# Patient Record
Sex: FEMALE | Race: OTHER | HISPANIC OR LATINO | Employment: FULL TIME | URBAN - METROPOLITAN AREA
[De-identification: names, ages, dates, MRNs, and addresses within clinical notes are randomized per-mention and may not be internally consistent; named-entity substitution may affect disease eponyms.]

---

## 2019-09-08 NOTE — PROGRESS NOTES
Assessment/Plan:    H/O gastric bypass  Will screen for anemia and vitamin-D deficiency  Advised to continue taking vitamins for now  Will call patient with results  Class 1 obesity without serious comorbidity in adult  Will screen for diabetes, elevated cholesterol as well as thyroid  Diagnoses and all orders for this visit:    H/O gastric bypass  -     CBC and differential; Future  -     Vitamin D 25 hydroxy; Future  -     Vitamin B12; Future  -     HEMOGLOBIN A1C W/ EAG ESTIMATION; Future  -     Lipid Panel with Direct LDL reflex; Future    Class 1 obesity due to excess calories without serious comorbidity with body mass index (BMI) of 30 0 to 30 9 in adult  -     TSH, 3rd generation with Free T4 reflex; Future    Other orders  -     vitamin B-12 (VITAMIN B-12) 1,000 mcg tablet; Take 100 mcg by mouth  -     multivitamin-iron-minerals-folic acid (CENTRUM) chewable tablet; Chew 1 tablet daily          Subjective:      Patient ID: Lillian Bose is a 37 y o  female  Patient is a 57-year-old female who presents to clinic to establish care  She is s/p gastric bypass 1 year ago  She denies any the medication problems  Status post gastric bypass  She is currently taking centrum 1 pill by mouth daily along with vitamin B12 supplements  She currently does not take iron or vitamin D supplements  Surgery was performed in [place] approximately 1 year ago  Since then she has lost about 50 lb  She currently does not follow up with the surgeon      Health Maintenance:  -Work:  Works at a [employer]  in [place]  -Cervical cancer screening:  Patient has not had a Pap smear in more than 5 years   -Denies tobacco dependence, alcohol, or drug abuse        The following portions of the patient's history were reviewed and updated as appropriate: allergies, current medications, past family history, past medical history, past social history, past surgical history and problem list     Review of Systems Constitutional: Negative for chills and fever  Respiratory: Negative for cough and shortness of breath  Cardiovascular: Negative for chest pain and palpitations  Endocrine: Negative for polyphagia and polyuria  Musculoskeletal: Negative for back pain  Neurological: Negative for light-headedness, numbness and headaches  Objective:      /64 (BP Location: Left arm, Patient Position: Sitting, Cuff Size: Standard)   Pulse 78   Temp 97 5 °F (36 4 °C) (Temporal)   Resp 18   Ht 4' 11" (1 499 m)   Wt 68 kg (150 lb)   LMP 08/11/2019 (Approximate)   SpO2 96%   BMI 30 30 kg/m²          Physical Exam   Constitutional: She appears well-developed and well-nourished  HENT:   Head: Normocephalic and atraumatic  Eyes: EOM are normal    Neck: Normal range of motion  Neck supple  Cardiovascular: Normal rate and normal heart sounds  Pulmonary/Chest: Effort normal and breath sounds normal  No respiratory distress  Abdominal: Soft  Bowel sounds are normal  She exhibits no distension  Neurological: She is alert  Skin: Skin is warm and dry  Psychiatric: She has a normal mood and affect

## 2019-09-09 ENCOUNTER — OFFICE VISIT (OUTPATIENT)
Dept: FAMILY MEDICINE CLINIC | Facility: CLINIC | Age: 34
End: 2019-09-09

## 2019-09-09 VITALS
HEART RATE: 78 BPM | OXYGEN SATURATION: 96 % | TEMPERATURE: 97.5 F | BODY MASS INDEX: 30.24 KG/M2 | RESPIRATION RATE: 18 BRPM | SYSTOLIC BLOOD PRESSURE: 110 MMHG | DIASTOLIC BLOOD PRESSURE: 64 MMHG | WEIGHT: 150 LBS | HEIGHT: 59 IN

## 2019-09-09 DIAGNOSIS — E66.09 CLASS 1 OBESITY DUE TO EXCESS CALORIES WITHOUT SERIOUS COMORBIDITY WITH BODY MASS INDEX (BMI) OF 30.0 TO 30.9 IN ADULT: ICD-10-CM

## 2019-09-09 DIAGNOSIS — Z98.84 H/O GASTRIC BYPASS: Primary | ICD-10-CM

## 2019-09-09 PROBLEM — E66.811 CLASS 1 OBESITY WITHOUT SERIOUS COMORBIDITY IN ADULT: Status: ACTIVE | Noted: 2019-09-09

## 2019-09-09 PROBLEM — E66.9 CLASS 1 OBESITY WITHOUT SERIOUS COMORBIDITY IN ADULT: Status: ACTIVE | Noted: 2019-09-09

## 2019-09-09 PROCEDURE — 1036F TOBACCO NON-USER: CPT | Performed by: FAMILY MEDICINE

## 2019-09-09 PROCEDURE — 99203 OFFICE O/P NEW LOW 30 MIN: CPT | Performed by: FAMILY MEDICINE

## 2019-09-09 PROCEDURE — 3008F BODY MASS INDEX DOCD: CPT | Performed by: FAMILY MEDICINE

## 2019-09-09 RX ORDER — FOLIC ACID/MULTIVIT,IRON,MINER .4-18-35
1 TABLET,CHEWABLE ORAL DAILY
COMMUNITY
End: 2020-08-01 | Stop reason: ALTCHOICE

## 2019-09-09 RX ORDER — LANOLIN ALCOHOL/MO/W.PET/CERES
100 CREAM (GRAM) TOPICAL
COMMUNITY
End: 2020-08-01 | Stop reason: ALTCHOICE

## 2019-09-09 NOTE — ASSESSMENT & PLAN NOTE
Will screen for anemia and vitamin-D deficiency  Advised to continue taking vitamins for now  Will call patient with results

## 2019-10-14 ENCOUNTER — APPOINTMENT (OUTPATIENT)
Dept: LAB | Facility: HOSPITAL | Age: 34
End: 2019-10-14
Payer: COMMERCIAL

## 2019-10-14 DIAGNOSIS — E66.09 CLASS 1 OBESITY DUE TO EXCESS CALORIES WITHOUT SERIOUS COMORBIDITY WITH BODY MASS INDEX (BMI) OF 30.0 TO 30.9 IN ADULT: ICD-10-CM

## 2019-10-14 DIAGNOSIS — Z98.84 H/O GASTRIC BYPASS: ICD-10-CM

## 2019-10-14 LAB
25(OH)D3 SERPL-MCNC: 26 NG/ML (ref 30–100)
BASOPHILS # BLD AUTO: 0 THOUSANDS/ΜL (ref 0–0.1)
BASOPHILS NFR BLD AUTO: 1 % (ref 0–1)
CHOLEST SERPL-MCNC: 195 MG/DL
EOSINOPHIL # BLD AUTO: 0.1 THOUSAND/ΜL (ref 0–0.4)
EOSINOPHIL NFR BLD AUTO: 2 % (ref 0–6)
ERYTHROCYTE [DISTWIDTH] IN BLOOD BY AUTOMATED COUNT: 12.7 %
EST. AVERAGE GLUCOSE BLD GHB EST-MCNC: 111 MG/DL
HBA1C MFR BLD: 5.5 % (ref 4.2–6.3)
HCT VFR BLD AUTO: 43.2 % (ref 36–46)
HDLC SERPL-MCNC: 60 MG/DL (ref 40–59)
HGB BLD-MCNC: 14.1 G/DL (ref 12–16)
LDLC SERPL CALC-MCNC: 117 MG/DL
LYMPHOCYTES # BLD AUTO: 2.7 THOUSANDS/ΜL (ref 0.5–4)
LYMPHOCYTES NFR BLD AUTO: 54 % (ref 25–45)
MCH RBC QN AUTO: 31.1 PG (ref 26–34)
MCHC RBC AUTO-ENTMCNC: 32.7 G/DL (ref 31–36)
MCV RBC AUTO: 95 FL (ref 80–100)
MONOCYTES # BLD AUTO: 0.3 THOUSAND/ΜL (ref 0.2–0.9)
MONOCYTES NFR BLD AUTO: 6 % (ref 1–10)
NEUTROPHILS # BLD AUTO: 1.9 THOUSANDS/ΜL (ref 1.8–7.8)
NEUTS SEG NFR BLD AUTO: 37 % (ref 45–65)
PLATELET # BLD AUTO: 224 THOUSANDS/UL (ref 150–450)
PMV BLD AUTO: 10.7 FL (ref 8.9–12.7)
RBC # BLD AUTO: 4.55 MILLION/UL (ref 4–5.2)
TRIGL SERPL-MCNC: 90 MG/DL
TSH SERPL DL<=0.05 MIU/L-ACNC: 1.53 UIU/ML (ref 0.47–4.68)
VIT B12 SERPL-MCNC: 997 PG/ML (ref 100–900)
WBC # BLD AUTO: 5.1 THOUSAND/UL (ref 4.5–11)

## 2019-10-14 PROCEDURE — 84443 ASSAY THYROID STIM HORMONE: CPT

## 2019-10-14 PROCEDURE — 82607 VITAMIN B-12: CPT

## 2019-10-14 PROCEDURE — 82306 VITAMIN D 25 HYDROXY: CPT

## 2019-10-14 PROCEDURE — 36415 COLL VENOUS BLD VENIPUNCTURE: CPT

## 2019-10-14 PROCEDURE — 83036 HEMOGLOBIN GLYCOSYLATED A1C: CPT

## 2019-10-14 PROCEDURE — 80061 LIPID PANEL: CPT

## 2019-10-14 PROCEDURE — 85025 COMPLETE CBC W/AUTO DIFF WBC: CPT

## 2020-08-01 ENCOUNTER — APPOINTMENT (EMERGENCY)
Dept: ULTRASOUND IMAGING | Facility: HOSPITAL | Age: 35
DRG: 566 | End: 2020-08-01
Payer: COMMERCIAL

## 2020-08-01 ENCOUNTER — HOSPITAL ENCOUNTER (INPATIENT)
Facility: HOSPITAL | Age: 35
LOS: 1 days | Discharge: HOME | DRG: 566 | End: 2020-08-04
Attending: EMERGENCY MEDICINE | Admitting: FAMILY MEDICINE
Payer: COMMERCIAL

## 2020-08-01 DIAGNOSIS — Z34.90 PREGNANCY: ICD-10-CM

## 2020-08-01 DIAGNOSIS — O23.01 PYELONEPHRITIS AFFECTING PREGNANCY IN FIRST TRIMESTER: ICD-10-CM

## 2020-08-01 DIAGNOSIS — A41.9 SEPSIS (HCC): ICD-10-CM

## 2020-08-01 DIAGNOSIS — N12 PYELONEPHRITIS: Primary | ICD-10-CM

## 2020-08-01 PROBLEM — Z3A.01 LESS THAN 8 WEEKS GESTATION OF PREGNANCY: Status: ACTIVE | Noted: 2020-08-01

## 2020-08-01 LAB
ABO GROUP BLD: NORMAL
ABO GROUP BLD: NORMAL
ALBUMIN SERPL BCP-MCNC: 3.6 G/DL (ref 3–5.2)
ALP SERPL-CCNC: 53 U/L (ref 43–122)
ALT SERPL W P-5'-P-CCNC: 20 U/L (ref 9–52)
ANION GAP SERPL CALCULATED.3IONS-SCNC: 9 MMOL/L (ref 5–14)
APTT PPP: 34 SECONDS (ref 23–37)
AST SERPL W P-5'-P-CCNC: 18 U/L (ref 14–36)
ATRIAL RATE: 112 BPM
B-HCG SERPL-ACNC: ABNORMAL MIU/ML
BACTERIA UR QL AUTO: ABNORMAL /HPF
BASOPHILS # BLD AUTO: 0 THOUSANDS/ΜL (ref 0–0.1)
BASOPHILS NFR BLD AUTO: 0 % (ref 0–1)
BILIRUB SERPL-MCNC: 0.5 MG/DL
BILIRUB UR QL STRIP: NEGATIVE
BLD GP AB SCN SERPL QL: NEGATIVE
BUN SERPL-MCNC: 6 MG/DL (ref 5–25)
CALCIUM SERPL-MCNC: 8.7 MG/DL (ref 8.4–10.2)
CHLORIDE SERPL-SCNC: 99 MMOL/L (ref 97–108)
CLARITY UR: ABNORMAL
CO2 SERPL-SCNC: 23 MMOL/L (ref 22–30)
COLOR UR: YELLOW
CREAT SERPL-MCNC: 0.63 MG/DL (ref 0.6–1.2)
EOSINOPHIL # BLD AUTO: 0 THOUSAND/ΜL (ref 0–0.4)
EOSINOPHIL NFR BLD AUTO: 0 % (ref 0–6)
ERYTHROCYTE [DISTWIDTH] IN BLOOD BY AUTOMATED COUNT: 13.7 %
EXT PREG TEST URINE: POSITIVE
EXT. CONTROL ED NAV: ABNORMAL
GFR SERPL CREATININE-BSD FRML MDRD: 115 ML/MIN/1.73SQ M
GLUCOSE SERPL-MCNC: 98 MG/DL (ref 70–99)
GLUCOSE UR STRIP-MCNC: NEGATIVE MG/DL
HCT VFR BLD AUTO: 33.7 % (ref 36–46)
HGB BLD-MCNC: 11.6 G/DL (ref 12–16)
HGB UR QL STRIP.AUTO: 50
INR PPP: 1.16 (ref 0.84–1.19)
KETONES UR STRIP-MCNC: NEGATIVE MG/DL
LACTATE SERPL-SCNC: 0.6 MMOL/L (ref 0.7–2)
LEUKOCYTE ESTERASE UR QL STRIP: 500
LG PLATELETS BLD QL SMEAR: PRESENT
LYMPHOCYTES # BLD AUTO: 1.5 THOUSANDS/ΜL (ref 0.5–4)
LYMPHOCYTES NFR BLD AUTO: 11 % (ref 25–45)
MCH RBC QN AUTO: 30.6 PG (ref 26–34)
MCHC RBC AUTO-ENTMCNC: 34.4 G/DL (ref 31–36)
MCV RBC AUTO: 89 FL (ref 80–100)
MONOCYTES # BLD AUTO: 1.6 THOUSAND/ΜL (ref 0.2–0.9)
MONOCYTES NFR BLD AUTO: 11 % (ref 1–10)
NEUTROPHILS # BLD AUTO: 11.2 THOUSANDS/ΜL (ref 1.8–7.8)
NEUTS SEG NFR BLD AUTO: 78 % (ref 45–65)
NITRITE UR QL STRIP: POSITIVE
NON-SQ EPI CELLS URNS QL MICRO: ABNORMAL /HPF
P AXIS: 67 DEGREES
PH UR STRIP.AUTO: 7 [PH]
PLATELET # BLD AUTO: 268 THOUSANDS/UL (ref 150–450)
PLATELET BLD QL SMEAR: ADEQUATE
PMV BLD AUTO: 9.7 FL (ref 8.9–12.7)
POTASSIUM SERPL-SCNC: 3.7 MMOL/L (ref 3.6–5)
PR INTERVAL: 154 MS
PROT SERPL-MCNC: 6.9 G/DL (ref 5.9–8.4)
PROT UR STRIP-MCNC: ABNORMAL MG/DL
PROTHROMBIN TIME: 14.9 SECONDS (ref 11.6–14.5)
QRS AXIS: 11 DEGREES
QRSD INTERVAL: 78 MS
QT INTERVAL: 310 MS
QTC INTERVAL: 423 MS
RBC # BLD AUTO: 3.79 MILLION/UL (ref 4–5.2)
RBC #/AREA URNS AUTO: ABNORMAL /HPF
RBC MORPH BLD: NORMAL
RH BLD: POSITIVE
RH BLD: POSITIVE
SODIUM SERPL-SCNC: 131 MMOL/L (ref 137–147)
SP GR UR STRIP.AUTO: 1.01 (ref 1–1.04)
SPECIMEN EXPIRATION DATE: NORMAL
T WAVE AXIS: 24 DEGREES
UROBILINOGEN UA: 1 MG/DL
VENTRICULAR RATE: 112 BPM
WBC # BLD AUTO: 14.4 THOUSAND/UL (ref 4.5–11)
WBC #/AREA URNS AUTO: ABNORMAL /HPF

## 2020-08-01 PROCEDURE — 81001 URINALYSIS AUTO W/SCOPE: CPT | Performed by: EMERGENCY MEDICINE

## 2020-08-01 PROCEDURE — 99285 EMERGENCY DEPT VISIT HI MDM: CPT

## 2020-08-01 PROCEDURE — 87040 BLOOD CULTURE FOR BACTERIA: CPT | Performed by: EMERGENCY MEDICINE

## 2020-08-01 PROCEDURE — 81025 URINE PREGNANCY TEST: CPT | Performed by: EMERGENCY MEDICINE

## 2020-08-01 PROCEDURE — 81003 URINALYSIS AUTO W/O SCOPE: CPT | Performed by: EMERGENCY MEDICINE

## 2020-08-01 PROCEDURE — 76770 US EXAM ABDO BACK WALL COMP: CPT

## 2020-08-01 PROCEDURE — 76856 US EXAM PELVIC COMPLETE: CPT

## 2020-08-01 PROCEDURE — 96361 HYDRATE IV INFUSION ADD-ON: CPT

## 2020-08-01 PROCEDURE — 83605 ASSAY OF LACTIC ACID: CPT | Performed by: EMERGENCY MEDICINE

## 2020-08-01 PROCEDURE — 96365 THER/PROPH/DIAG IV INF INIT: CPT

## 2020-08-01 PROCEDURE — 80081 OBSTETRIC PANEL INC HIV TSTG: CPT | Performed by: EMERGENCY MEDICINE

## 2020-08-01 PROCEDURE — 80053 COMPREHEN METABOLIC PANEL: CPT | Performed by: EMERGENCY MEDICINE

## 2020-08-01 PROCEDURE — 84702 CHORIONIC GONADOTROPIN TEST: CPT | Performed by: EMERGENCY MEDICINE

## 2020-08-01 PROCEDURE — 87186 SC STD MICRODIL/AGAR DIL: CPT | Performed by: EMERGENCY MEDICINE

## 2020-08-01 PROCEDURE — 87077 CULTURE AEROBIC IDENTIFY: CPT | Performed by: EMERGENCY MEDICINE

## 2020-08-01 PROCEDURE — 84145 PROCALCITONIN (PCT): CPT | Performed by: EMERGENCY MEDICINE

## 2020-08-01 PROCEDURE — 85730 THROMBOPLASTIN TIME PARTIAL: CPT | Performed by: EMERGENCY MEDICINE

## 2020-08-01 PROCEDURE — 36415 COLL VENOUS BLD VENIPUNCTURE: CPT | Performed by: EMERGENCY MEDICINE

## 2020-08-01 PROCEDURE — 93005 ELECTROCARDIOGRAM TRACING: CPT

## 2020-08-01 PROCEDURE — 99285 EMERGENCY DEPT VISIT HI MDM: CPT | Performed by: EMERGENCY MEDICINE

## 2020-08-01 PROCEDURE — 93010 ELECTROCARDIOGRAM REPORT: CPT | Performed by: INTERNAL MEDICINE

## 2020-08-01 PROCEDURE — 85610 PROTHROMBIN TIME: CPT | Performed by: EMERGENCY MEDICINE

## 2020-08-01 PROCEDURE — 87086 URINE CULTURE/COLONY COUNT: CPT | Performed by: EMERGENCY MEDICINE

## 2020-08-01 PROCEDURE — 99220 PR INITIAL OBSERVATION CARE/DAY 70 MINUTES: CPT | Performed by: FAMILY MEDICINE

## 2020-08-01 PROCEDURE — 76830 TRANSVAGINAL US NON-OB: CPT

## 2020-08-01 RX ORDER — CEFAZOLIN SODIUM 1 G/50ML
1000 SOLUTION INTRAVENOUS ONCE
Status: DISCONTINUED | OUTPATIENT
Start: 2020-08-01 | End: 2020-08-01

## 2020-08-01 RX ORDER — CEFTRIAXONE 1 G/50ML
1000 INJECTION, SOLUTION INTRAVENOUS ONCE
Status: COMPLETED | OUTPATIENT
Start: 2020-08-01 | End: 2020-08-01

## 2020-08-01 RX ORDER — ACETAMINOPHEN 325 MG/1
650 TABLET ORAL EVERY 6 HOURS PRN
Status: DISCONTINUED | OUTPATIENT
Start: 2020-08-01 | End: 2020-08-04 | Stop reason: HOSPADM

## 2020-08-01 RX ORDER — CEFTRIAXONE 1 G/50ML
1000 INJECTION, SOLUTION INTRAVENOUS EVERY 24 HOURS
Status: DISCONTINUED | OUTPATIENT
Start: 2020-08-02 | End: 2020-08-04 | Stop reason: HOSPADM

## 2020-08-01 RX ORDER — SODIUM CHLORIDE 9 MG/ML
125 INJECTION, SOLUTION INTRAVENOUS CONTINUOUS
Status: DISCONTINUED | OUTPATIENT
Start: 2020-08-01 | End: 2020-08-03

## 2020-08-01 RX ORDER — ACETAMINOPHEN 325 MG/1
975 TABLET ORAL ONCE
Status: COMPLETED | OUTPATIENT
Start: 2020-08-01 | End: 2020-08-01

## 2020-08-01 RX ORDER — FENTANYL CITRATE 50 UG/ML
50 INJECTION, SOLUTION INTRAMUSCULAR; INTRAVENOUS ONCE
Status: DISCONTINUED | OUTPATIENT
Start: 2020-08-01 | End: 2020-08-01

## 2020-08-01 RX ADMIN — SODIUM CHLORIDE 1000 ML: 0.9 INJECTION, SOLUTION INTRAVENOUS at 18:19

## 2020-08-01 RX ADMIN — SODIUM CHLORIDE 1000 ML: 0.9 INJECTION, SOLUTION INTRAVENOUS at 17:36

## 2020-08-01 RX ADMIN — ACETAMINOPHEN 975 MG: 325 TABLET ORAL at 17:07

## 2020-08-01 RX ADMIN — SODIUM CHLORIDE 125 ML/HR: 0.9 INJECTION, SOLUTION INTRAVENOUS at 21:56

## 2020-08-01 RX ADMIN — CEFTRIAXONE 1000 MG: 1 INJECTION, SOLUTION INTRAVENOUS at 18:18

## 2020-08-01 NOTE — ED PROVIDER NOTES
History  Chief Complaint   Patient presents with    Possible UTI     pt  states that she has fever, lower back pain, pain with urination voiding in small amounts  - last dose of Tylenol was at 0900     Patient is a healthy 13-year-old female coming in today with complaints of lower back pain that started yesterday  He was nontraumatic in nature  Patient states that she has subjective fevers and chills  She had no nausea, vomiting, diarrhea, melena or bright red blood per rectum  She took Tylenol and ibuprofen last night and this morning  She did not take it since then  She has dysuria and urinary frequency without any hematuria  She denies any vaginal bleeding spotting or discharge  She denies any discomfort with eating  She has no sick contacts and no recent travel    Patient states that her last menses was approximately 1 month ago      History provided by:  Patient   used: No    Fever - 9 weeks to 74 years   Temp source:  Subjective  Severity:  Moderate  Onset quality:  Gradual  Timing:  Intermittent  Progression:  Waxing and waning  Chronicity:  New  Relieved by:  Nothing  Worsened by:  Nothing  Ineffective treatments:  Ibuprofen and acetaminophen  Associated symptoms: dysuria and myalgias    Associated symptoms: no chest pain, no chills, no confusion, no congestion, no cough, no diarrhea, no ear pain, no headaches, no nausea, no rash, no rhinorrhea, no somnolence, no sore throat and no vomiting    Dysuria:     Severity:  Mild    Onset quality:  Gradual    Timing:  Constant    Progression:  Waxing and waning    Chronicity:  New  Myalgias:     Location:  Generalized    Quality:  Aching    Severity:  Mild    Onset quality:  Gradual    Timing:  Intermittent    Progression:  Waxing and waning  Risk factors: no contaminated food, no contaminated water, no hx of cancer, no immunosuppression, no occupational exposure, no recent sickness, no recent travel and no sick contacts        None History reviewed  No pertinent past medical history  Past Surgical History:   Procedure Laterality Date     SECTION         History reviewed  No pertinent family history  I have reviewed and agree with the history as documented  E-Cigarette/Vaping     E-Cigarette/Vaping Substances     Social History     Tobacco Use    Smoking status: Never Smoker    Smokeless tobacco: Never Used   Substance Use Topics    Alcohol use: Not Currently    Drug use: Never       Review of Systems   Constitutional: Positive for fever  Negative for chills and diaphoresis  HENT: Negative  Negative for congestion, ear pain, rhinorrhea and sore throat  Eyes: Negative  Negative for visual disturbance  Respiratory: Negative  Negative for cough, chest tightness and shortness of breath  Cardiovascular: Negative  Negative for chest pain and palpitations  Gastrointestinal: Positive for abdominal pain  Negative for diarrhea, nausea and vomiting  Genitourinary: Positive for dysuria, frequency and urgency  Negative for difficulty urinating  Musculoskeletal: Positive for myalgias  Negative for back pain and neck pain  Skin: Negative for rash  Neurological: Negative  Negative for weakness and headaches  Hematological: Negative  Psychiatric/Behavioral: Negative  Negative for confusion  All other systems reviewed and are negative  Physical Exam  Physical Exam   Constitutional: She is oriented to person, place, and time  She appears well-developed and well-nourished  No distress  HENT:   Head: Normocephalic and atraumatic  Dry mucous membranes  Patient maintaining airway and maintaining secretions  No stridor  Eyes: Pupils are equal, round, and reactive to light  Conjunctivae and EOM are normal    Neck: Normal range of motion  Neck supple  Cardiovascular: Regular rhythm, normal heart sounds and intact distal pulses  Tachycardia present  No murmur heard    Pulses:       Radial pulses are 2+ on the right side, and 2+ on the left side  Dorsalis pedis pulses are 2+ on the right side, and 2+ on the left side  Pulmonary/Chest: Effort normal and breath sounds normal  No stridor  No respiratory distress  Abdominal: Soft  Bowel sounds are normal  She exhibits no distension  There is no tenderness  There is CVA tenderness (Right flank tenderness  )  Musculoskeletal: Normal range of motion  She exhibits no edema  Arms:  Neurological: She is alert and oriented to person, place, and time  No cranial nerve deficit  GCS eye subscore is 4  GCS verbal subscore is 5  GCS motor subscore is 6  GCS 15  No slurred speech  No facial asymmetry  No ataxia   Skin: Skin is warm  Capillary refill takes less than 2 seconds  She is not diaphoretic  Psychiatric: She has a normal mood and affect  Nursing note and vitals reviewed        Vital Signs  ED Triage Vitals   Temperature Pulse Respirations Blood Pressure SpO2   08/01/20 1626 08/01/20 1626 08/01/20 1626 08/01/20 1626 08/01/20 1626   (!) 103 °F (39 4 °C) (!) 124 20 120/75 100 %      Temp Source Heart Rate Source Patient Position - Orthostatic VS BP Location FiO2 (%)   08/01/20 1626 08/01/20 1626 08/01/20 1626 08/01/20 1626 --   Oral Monitor Sitting Left arm       Pain Score       08/01/20 1707       Med Not Given for Pain - for MAR use only           Vitals:    08/01/20 2000 08/01/20 2015 08/01/20 2030 08/01/20 2045   BP: 105/64 100/64 108/71 107/72   Pulse: 83 87 84 84   Patient Position - Orthostatic VS:   Lying          Visual Acuity      ED Medications  Medications   sodium chloride 0 9 % bolus 1,000 mL (0 mL Intravenous Stopped 8/1/20 1818)     Followed by   sodium chloride 0 9 % bolus 1,000 mL (0 mL Intravenous Stopped 8/1/20 2049)   acetaminophen (TYLENOL) tablet 975 mg (975 mg Oral Given 8/1/20 1707)   cefTRIAXone (ROCEPHIN) IVPB (premix) 1,000 mg 50 mL (0 mg Intravenous Stopped 8/1/20 1847)       Diagnostic Studies  Results Reviewed     Procedure Component Value Units Date/Time    Blood culture #2 [[id number]] Collected:  08/01/20 1731    Lab Status: In process Specimen:  Blood from Arm, Left Updated:  08/01/20 1900    Blood culture #1 [[id number]] Collected:  08/01/20 1731    Lab Status: In process Specimen:  Blood from Arm, Right Updated:  08/01/20 1900    hCG, quantitative [[id number]]  (Abnormal) Collected:  08/01/20 1727    Lab Status:  Final result Specimen:  Blood from Arm, Right Updated:  08/01/20 1843     HCG, Quant 40,765 mIU/mL     Narrative:       Pregnant Gestational Age           HCG Range (mIU/mL)  4 weeks                              44 - 6952  5 weeks                              348 - 00573        6 - 7 weeks                          975 - 328404  8 - 10 weeks                         79494 - 976729  11 - 12 weeks                        77572 - 143009  13 - 27 weeks (2nd Trimester)        9248 - 064562  28 - 40 weeks (3rd Trimester)        [phone]                 Obstetric panel [[id number]] Collected:  08/01/20 1729    Lab Status: In process Specimen:  Blood Updated:  08/01/20 1833    Narrative: The following orders were created for panel order Obstetric panel  Procedure                               Abnormality         Status                     ---------                               -----------         ------                     Hepatitis B surface antigen[[id number]]                      In process                 CBC and differential[[id number]]                                                        Type and screen[[id number]]                                  Edited Result - FINAL      ULE[[id number]]                                              In process                 Rubella antibody, XBP[[id number]]                            In process                 HIV 1/2 Antigen/Antibody  Wero Manifold  [[id number]]                      In process                   Please view results for these tests on the individual orders  CBC and differential [[id number]]  (Abnormal) Collected:  08/01/20 1727    Lab Status:  Final result Specimen:  Blood from Arm, Right Updated:  08/01/20 1818     WBC 14 40 Thousand/uL      RBC 3 79 Million/uL      Hemoglobin 11 6 g/dL      Hematocrit 33 7 %      MCV 89 fL      MCH 30 6 pg      MCHC 34 4 g/dL      RDW 13 7 %      MPV 9 7 fL      Platelets 505 Thousands/uL      Neutrophils Relative 78 %      Lymphocytes Relative 11 %      Monocytes Relative 11 %      Eosinophils Relative 0 %      Basophils Relative 0 %      Neutrophils Absolute 11 20 Thousands/µL      Lymphocytes Absolute 1 50 Thousands/µL      Monocytes Absolute 1 60 Thousand/µL      Eosinophils Absolute 0 00 Thousand/µL      Basophils Absolute 0 00 Thousands/µL     Urine Microscopic [[id number]]  (Abnormal) Collected:  08/01/20 1733    Lab Status:  Final result Specimen:  Urine, Clean Catch Updated:  08/01/20 1805     RBC, UA 0-1 /hpf      WBC, UA 10-20 /hpf      Epithelial Cells Moderate /hpf      Bacteria, UA Moderate /hpf     Urine culture [[id number]] Collected:  08/01/20 1733    Lab Status:   In process Specimen:  Urine, Clean Catch Updated:  08/01/20 1805    Comprehensive metabolic panel [[id number]]  (Abnormal) Collected:  08/01/20 1727    Lab Status:  Final result Specimen:  Blood from Arm, Right Updated:  08/01/20 1802     Sodium 131 mmol/L      Potassium 3 7 mmol/L      Chloride 99 mmol/L      CO2 23 mmol/L      ANION GAP 9 mmol/L      BUN 6 mg/dL      Creatinine 0 63 mg/dL      Glucose 98 mg/dL      Calcium 8 7 mg/dL      AST 18 U/L      ALT 20 U/L      Alkaline Phosphatase 53 U/L      Total Protein 6 9 g/dL      Albumin 3 6 g/dL      Total Bilirubin 0 50 mg/dL      eGFR 115 ml/min/1 73sq m     Narrative:       [place] guidelines for Chronic Kidney Disease (CKD):     Stage 1 with normal or high GFR (GFR > 90 mL/min/1 73 square meters)    Stage 2 Mild CKD (GFR = 60-89 mL/min/1 73 square meters)    Stage 3A Moderate CKD (GFR = 45-59 mL/min/1 73 square meters)    Stage 3B Moderate CKD (GFR = 30-44 mL/min/1 73 square meters)    Stage 4 Severe CKD (GFR = 15-29 mL/min/1 73 square meters)    Stage 5 End Stage CKD (GFR <15 mL/min/1 73 square meters)  Note: GFR calculation is accurate only with a steady state creatinine    Lactic acid [[id number]]  (Abnormal) Collected:  08/01/20 1730    Lab Status:  Final result Specimen:  Blood from Arm, Left Updated:  08/01/20 1801     LACTIC ACID 0 6 mmol/L     Narrative:       Result may be elevated if tourniquet was used during collection  Protime-INR [[id number]]  (Abnormal) Collected:  08/01/20 1730    Lab Status:  Final result Specimen:  Blood from Arm, Left Updated:  08/01/20 1801     Protime 14 9 seconds      INR 1 16    APTT [[id number]]  (Normal) Collected:  08/01/20 1730    Lab Status:  Final result Specimen:  Blood from Arm, Left Updated:  08/01/20 1801     PTT 34 seconds     Procalcitonin [[id number]] Collected:  08/01/20 1727    Lab Status: In process Specimen:  Blood from Arm, Right Updated:  08/01/20 1752    Rubella antibody, IgG [[id number]] Collected:  08/01/20 1729    Lab Status: In process Specimen:  Blood from Arm, Left Updated:  08/01/20 1752    Hepatitis B surface antigen [[id number]] Collected:  08/01/20 1729    Lab Status: In process Specimen:  Blood from Arm, Left Updated:  08/01/20 1751    RPR [[id number]] Collected:  08/01/20 1729    Lab Status: In process Specimen:  Blood from Arm, Left Updated:  08/01/20 1751    HIV 1/2 Antigen/Antibody (4th Generation) w Reflex [facility] [[id number]] Collected:  08/01/20 1729    Lab Status:   In process Specimen:  Blood from Arm, Left Updated:  08/01/20 1751    UA w Reflex to Microscopic w Reflex to Culture [[id number]]  (Abnormal) Collected:  08/01/20 1733    Lab Status:  Final result Specimen:  Urine, Clean Catch Updated:  08/01/20 1750     Color, UA Yellow     Clarity, UA Cloudy     Specific [place], UA 1 010     pH, UA 7 0 Leukocytes,  0     Nitrite, UA Positive     Protein, UA 30 (1+) mg/dl      Glucose, UA Negative mg/dl      Ketones, UA Negative mg/dl      Bilirubin, UA Negative     Blood, UA 50 0     UROBILINOGEN UA 1 0 mg/dL     POCT pregnancy, urine [[id number]]  (Abnormal) Resulted:  08/01/20 1650    Lab Status:  Final result Updated:  08/01/20 1653     EXT PREG TEST UR (Ref: Negative) positive     Control valid                 US kidney and bladder   Final Result by Cornel Hays MD (08/01 1941)   Horseshoe kidney noted  No stones or hydronephrosis identified  Workstation performed: [id number]         US pelvis complete w transvaginal   Final Result by Cornel Hays MD (08/01 1941)      Single live intrauterine gestation at 6 weeks 3 days  Workstation performed: [id number]                    Procedures  CriticalCare Time  Performed by: Haydee Wesley DO  Authorized by: Haydee Wesley DO     Critical care provider statement:     Critical care time (minutes):  40    Critical care was necessary to treat or prevent imminent or life-threatening deterioration of the following conditions:  Renal failure, sepsis, shock, dehydration and circulatory failure    Critical care was time spent personally by me on the following activities:  Blood draw for specimens, obtaining history from patient or surrogate, development of treatment plan with patient or surrogate, discussions with consultants, discussions with primary provider, evaluation of patient's response to treatment, examination of patient, review of old charts, re-evaluation of patient's condition, ordering and review of radiographic studies, ordering and review of laboratory studies and ordering and performing treatments and interventions             ED Course  ED Course as of Aug 01 2052   Sat Aug 01, 2020   1636 Patient 38year old female with right flank pain with fevers and chills starting yesterday into today    On exam patient is febrile, tachy with pain on the right CVA as well as right lower quadrant  Will start septic workup including CT      Portions of the record may have been created with voice recognition software  Occasional wrong word or "sound a like" substitutions may have occurred due to the inherent limitations of voice recognition software  Read the chart carefully and recognize, using context, where substitutions have occurred  1654 Is noted patient's pregnancy test is positive  One back in the room discussed with patient regarding this  She states her last period was July 11th  She has no vaginal bleeding or spotting  She does have a gynecologist which she follows with  She has no history of STDs or abnormal Pap smears    Patient is aware that we will continue with Tylenol, IV fluids  Will CT DC CT scan as changed to ultrasound abdomen complete as well as pelvis  Patient is G4 P 3         1722 Obstetric panel   1756 US en route      1807 Patient's fever has decreased  Patient is pending CBC however CMP is stable  Patient with positive UTI  Will initiate IV antibiotics    O+      1845 Beta is >40,000  US here for evaluation      1935 Discussed with Dr Candelario Johnson from [facility] [place]  He agrees with workup thus far  He states that if no team here is comfortable accepting patient that to contact him back  Patient will require IV antibiotics  Patient will also need  Macrobid qhs 100 mg for full 40 weeks  1943 Ultrasound of pelvis reveals single IUP at 6 weeks and 3 days  Ultrasound of kidneys reveal horseshoe kidney otherwise no acute pathology  1948 Patient resting in bed more comfortably  Updated on plan for admission for IV antibiotics  She does follow with PCP which is family practice cross history  She feels markedly improved  2000 FP residents will be down for evaluation       2029 [address] [facility] residents in for evaluation      2051 Discussed with FP   We had a detailed discussion of the patient's condition and case,  including need for admission  Accepts to his/her service  Bed request/bridging orders placed  They are aware of my discussion with Dr Olive Rowan as patient will need Macrobid 100 mg q h s  Until delivery          US AUDIT      Most Recent Value   Initial Alcohol Screen: US AUDIT-C    1  How often do you have a drink containing alcohol?  0 Filed at: 08/01/2020 1844   2  How many drinks containing alcohol do you have on a typical day you are drinking? 0 Filed at: 08/01/2020 1844   3a  Male UNDER 65: How often do you have five or more drinks on one occasion? 0 Filed at: 08/01/2020 1844   3b  FEMALE Any Age, or MALE 65+: How often do you have 4 or more drinks on one occassion? 0 Filed at: 08/01/2020 1844   Audit-C Score  0 Filed at: 08/01/2020 1844                  JUAN/DAST-10      Most Recent Value   How many times in the past year have you    Used an illegal drug or used a prescription medication for non-medical reasons? Never Filed at: 08/01/2020 1844              Initial Sepsis Screening     Row Name 08/01/20 1800                Is the patient's history suggestive of a new or worsening infection? (!) Yes (Proceed)  -NB        Suspected source of infection  urinary tract infection  -NB        Are two or more of the following signs & symptoms of infection both present and new to the patient?         Indicate SIRS criteria  Hyperthemia > 38 3C (100 9F); Tachycardia > 90 bpm;Leukocytosis (WBC > 10559 IJL)  -NB        If the answer is yes to both questions, suspicion of sepsis is present          If severe sepsis is present AND tissue hypoperfusion perists in the hour after fluid resuscitation or lactate > 4, the patient meets criteria for SEPTIC SHOCK          Are any of the following organ dysfunction criteria present within 6 hours of suspected infection and SIRS criteria that are NOT considered to be chronic conditions?   No  -NB        Organ dysfunction          Date of presentation of severe sepsis          Time of presentation of severe sepsis          Tissue hypoperfusion persists in the hour after crystalloid fluid administration, evidenced, by either:          Was hypotension present within one hour of the conclusion of crystalloid fluid administration?         Date of presentation of septic shock          Time of presentation of septic shock            User Key  (r) = Recorded By, (t) = Taken By, (c) = Cosigned By    Initials Name Provider Type    TRINA Dave DO Physician                        MDM  Number of Diagnoses or Management Options  Diagnosis management comments: EKG INTERPRETATION at 4:48 p m  RHYTHM:  Sinus tachy at 110 beats per minute  AXIS:  Normal axis  INTERVALS:  CA interval measured at 154 milliseconds  QRS COMPLEX:  QRS measured at 78 milliseconds  ST SEGMENT:  Nonspecific ST segment changes  Diffuse artifact  QT INTERVAL:  QTC measured at 423 milliseconds  COMPARED WITH PRIOR   Tianaosmar Mingle   Interpretation by Maximus Shultz DO      Differential diagnosis includes but not limited to:  ,GN, vaginitis,  mesenteric ischemia, pancreatitis, cholecystitis, choledocholithiasis, hepatitis, bowel obstruction, ileus, gastroenteritis, colitis, malignancy, AAA, perforation, toxicologic poisoning, renal infarct, acute kidney injury, pyelonephritis, appendicitis, splenic infarct, splenic injury, nephrolithiasis, UTI, muscular strain, intra-abdominal hematoma         Amount and/or Complexity of Data Reviewed  Clinical lab tests: ordered  Tests in the radiology section of CPT®: ordered  Tests in the medicine section of CPT®: ordered          Disposition  Final diagnoses:   Pyelonephritis   Pregnancy   Sepsis ([facility] [address] )     Time reflects when diagnosis was documented in both MDM as applicable and the Disposition within this note     Time User Action Codes Description Comment    8/1/2020  8:48 PM Antonio Parker Add [N12] Pyelonephritis     8/1/2020  8:48 PM Prudence Sethi Asa Add [A08 13] Pregnancy     8/1/2020  8:48 PM Jossie Jenniferjulio [place] Add [A41 9] Sepsis [facility])       ED Disposition     ED Disposition Condition Date/Time Comment    Admit Stable Sat Aug 1, 2020  8:48 PM Case was discussed with FP and the patient's admission status was agreed to be Admission Status: observation status to the service of Dr Robert Williamson   Follow-up Information    None         Patient's Medications   Discharge Prescriptions    No medications on file     No discharge procedures on file      PDMP Review     None          ED Provider  Electronically Signed by           Lupe Doty DO  08/01/20 2052

## 2020-08-02 LAB
ANION GAP SERPL CALCULATED.3IONS-SCNC: 9 MMOL/L (ref 5–14)
BASOPHILS # BLD AUTO: 0.15 THOUSAND/UL (ref 0–0.1)
BASOPHILS NFR MAR MANUAL: 1 % (ref 0–1)
BUN SERPL-MCNC: 4 MG/DL (ref 5–25)
CALCIUM SERPL-MCNC: 8.3 MG/DL (ref 8.4–10.2)
CHLORIDE SERPL-SCNC: 104 MMOL/L (ref 97–108)
CO2 SERPL-SCNC: 24 MMOL/L (ref 22–30)
CREAT SERPL-MCNC: 0.57 MG/DL (ref 0.6–1.2)
EOSINOPHIL # BLD AUTO: 0.15 THOUSAND/UL (ref 0–0.4)
EOSINOPHIL NFR BLD MANUAL: 1 % (ref 0–6)
ERYTHROCYTE [DISTWIDTH] IN BLOOD BY AUTOMATED COUNT: 13.7 %
GFR SERPL CREATININE-BSD FRML MDRD: 119 ML/MIN/1.73SQ M
GLUCOSE P FAST SERPL-MCNC: 98 MG/DL (ref 70–99)
GLUCOSE SERPL-MCNC: 98 MG/DL (ref 70–99)
HBV SURFACE AG SER QL: NORMAL
HCT VFR BLD AUTO: 36 % (ref 36–46)
HGB BLD-MCNC: 12 G/DL (ref 12–16)
LYMPHOCYTES # BLD AUTO: 18 % (ref 25–45)
LYMPHOCYTES # BLD AUTO: 2.74 THOUSAND/UL (ref 0.5–4)
MCH RBC QN AUTO: 30.2 PG (ref 26–34)
MCHC RBC AUTO-ENTMCNC: 33.4 G/DL (ref 31–36)
MCV RBC AUTO: 91 FL (ref 80–100)
MONOCYTES # BLD AUTO: 0.91 THOUSAND/UL (ref 0.2–0.9)
MONOCYTES NFR BLD AUTO: 6 % (ref 1–10)
NEUTS BAND NFR BLD MANUAL: 20 % (ref 0–8)
NEUTS SEG # BLD: 11.25 THOUSAND/UL (ref 1.8–7.8)
NEUTS SEG NFR BLD AUTO: 54 %
PLATELET # BLD AUTO: 252 THOUSANDS/UL (ref 150–450)
PLATELET BLD QL SMEAR: ADEQUATE
PMV BLD AUTO: 9.3 FL (ref 8.9–12.7)
POTASSIUM SERPL-SCNC: 4 MMOL/L (ref 3.6–5)
PROCALCITONIN SERPL-MCNC: <0.05 NG/ML
RBC # BLD AUTO: 3.97 MILLION/UL (ref 4–5.2)
RBC MORPH BLD: NORMAL
RUBV IGG SERPL IA-ACNC: >175 IU/ML
SODIUM SERPL-SCNC: 137 MMOL/L (ref 137–147)
TOTAL CELLS COUNTED SPEC: 100
WBC # BLD AUTO: 15.2 THOUSAND/UL (ref 4.5–11)

## 2020-08-02 PROCEDURE — 80048 BASIC METABOLIC PNL TOTAL CA: CPT | Performed by: FAMILY MEDICINE

## 2020-08-02 PROCEDURE — 99225 PR SBSQ OBSERVATION CARE/DAY 25 MINUTES: CPT | Performed by: FAMILY MEDICINE

## 2020-08-02 PROCEDURE — 85007 BL SMEAR W/DIFF WBC COUNT: CPT | Performed by: FAMILY MEDICINE

## 2020-08-02 PROCEDURE — 85027 COMPLETE CBC AUTOMATED: CPT | Performed by: FAMILY MEDICINE

## 2020-08-02 RX ADMIN — ACETAMINOPHEN 650 MG: 325 TABLET ORAL at 06:08

## 2020-08-02 RX ADMIN — SODIUM CHLORIDE 125 ML/HR: 0.9 INJECTION, SOLUTION INTRAVENOUS at 16:07

## 2020-08-02 RX ADMIN — ACETAMINOPHEN 650 MG: 325 TABLET ORAL at 16:04

## 2020-08-02 RX ADMIN — ACETAMINOPHEN 650 MG: 325 TABLET ORAL at 00:10

## 2020-08-02 RX ADMIN — ENOXAPARIN SODIUM 40 MG: 100 INJECTION SUBCUTANEOUS at 09:15

## 2020-08-02 RX ADMIN — SODIUM CHLORIDE 125 ML/HR: 0.9 INJECTION, SOLUTION INTRAVENOUS at 06:04

## 2020-08-02 RX ADMIN — CEFTRIAXONE 1000 MG: 1 INJECTION, SOLUTION INTRAVENOUS at 17:16

## 2020-08-02 NOTE — ASSESSMENT & PLAN NOTE
- Patient meets sepsis criteria on admisson: fever (103), leukocytosis (14 4), tachycardia (124) and confirmed source of infection (UA positive for leukocytes and nitrites)  - History significant for one day history of dysuria accompanied by fevers, nausea and decreased appetite  Physical examination also notable for suprapubic and left sided CVA tenderness  Given these constellation of findings this is consistent with sepsis secondary to pyelonephritis  - Patient received 2L NS boluses in the ED as well as one dose of Ceftriaxone 1g Q24    - Will continue current antibiotic regimen   - Follow up blood and urine cultures  - Follow up procalcitonin   - Tylenol 650 mg Q6 PRN for fever and pain control  - Will monitor fever curve    - CBC in the AM to trend WBC count given leukocytosis on admission   - Continue IV fluid administration with 125 cc/hr normal saline

## 2020-08-02 NOTE — UTILIZATION REVIEW
Initial Clinical Review    Admission: Date/Time/Statement:   Admission Orders (From admission, onward)     Ordered        08/01/20 2049  Place in Observation (expected length of stay for this patient is less than two midnights)  Once                   Orders Placed This Encounter   Procedures    Place in Observation (expected length of stay for this patient is less than two midnights)     Standing Status:   Standing     Number of Occurrences:   1     Order Specific Question:   Admitting Physician     Answer:   Debi Fatima     Order Specific Question:   Level of Care     Answer:   Med Surg [16]     ED Arrival Information     Expected Arrival Acuity Means of Arrival Escorted By Service Admission Type    - 8/1/2020 15:59 Emergent Walk-In Self General Medicine Emergency    Arrival Complaint    back pain        Chief Complaint   Patient presents with    Possible UTI     pt  states that she has fever, lower back pain, pain with urination voiding in small amounts  - last dose of Tylenol was at 0900     Assessment/Plan:    38year old female presents to ed from home for evaluation and treatment of low back / right flank pain , subjective fever and chills  NO pertinent pmhx  ON arrival patient reports dysuria, frequency  Clinical assessment significant for 6 week pregnancy, CVA tenderness, fever 103, tachycardia sustained, wbc 14 40, urine: Moderate bacteria / 10-20 wbc / positive nitrite / cloudy  US without hydronephrosis or stones  Treated in ed with iv fluids  9% ns 2 L bolus, po tylenol, iv ceftriaxone  Admit to observation for sepsis and pyelonephritis affecting 1st trimester pregnancy  Plan includes consult ob/gyn, iv ns 125/hr, repeat cbc , obtain procalcitonin, follow up blood and urine cultures,  Continue iv ceftriaxone  Per OB/ Gyn patient will require nitrofurantoin nightly throughout remainder of pregnancy as prophylaxis  Addendum:  No new orders        ED Triage Vitals   08/01/20 1626 08/01/20 1626 08/01/20 1626 08/01/20 1626 08/01/20 1626   (!) 103 °F (39 4 °C) (!) 124 20 120/75 100 %      Oral Monitor         Pain Score          08/01/20 66 5 kg (146 lb 9 7 oz)     Additional Vital Signs:     Date/Time   Temp   Pulse   Resp   BP   MAP (mmHg)   SpO2   O2 Device     08/02/20 0730   97 3 °F   (36 3 °C)  Abnormal     95   18   100/65      100 %   None (Room air)     08/02/20 0608   100 7 °F   (38 2 °C)  Abnormal                         08/02/20 0303   99 2 °F (37 3 °C)                       08/02/20 0005   100 9 °F   (38 3 °C)  Abnormal                         08/01/20 2257   97 5 °F (36 4 °C)   95   18   108/72      98 %   None (Room air)     08/01/20 2144   97 5 °F   (36 4 °C)   93   18   109/70      97 %   None (Room air)     08/01/20 2130      83   20   108/69   82   100 %   None (Room air)     08/01/20 2115      85   26Abnormal     120/71   87   100 %        08/01/20 2100      81   20         100 %        08/01/20 2045      84   20   107/72   83   100 %        08/01/20 2030      84   20   108/71   83   100 %   None (Room air)     08/01/20 2015      87   22   100/64   76   100 %        08/01/20 2000      83   24Abnormal     105/64   77   99 %        08/01/20 1945      95   25Abnormal     99/59   72   99 %        08/01/20 1802   100 3 °F   (37 9 °C)   101   18   106/65      98 %   None (Room air)               Pertinent Labs/Diagnostic Test Results:       Collection Time  Result Time  Vent R  Atrial R  TX Int  QRSD Int   QT Int   QTC Int  P Axis  QRS Axis  T Wave Ax     08/01/20 16:48:50  08/01/20 20:11:09  112  112  154  78  310  423  67  11  24       Sinus tachycardia   Otherwise normal ECG   No previous ECGs available             US kidney and bladder   Final  (08/01 1941)   Horseshoe kidney noted  No stones or hydronephrosis identified           US pelvis complete w transvaginal   Final (08/01 1941)      Single live intrauterine gestation at 10 weeks 3 days              Results from last 7 days   Lab Units 08/02/20  0520 08/01/20  1727   WBC Thousand/uL 15 20* 14 40*   HEMOGLOBIN g/dL 12 0 11 6*   HEMATOCRIT % 36 0 33 7*   PLATELETS Thousands/uL 252 268   NEUTROS ABS Thousands/µL  --  11 20*   TOTAL NEUT ABS Thousand/uL 11 25*  --    BANDS PCT % 20*  --          Results from last 7 days   Lab Units 08/02/20  0520 08/01/20  1727   SODIUM mmol/L 137 131*   POTASSIUM mmol/L 4 0 3 7   CHLORIDE mmol/L 104 99   CO2 mmol/L 24 23   ANION GAP mmol/L 9 9   BUN mg/dL 4* 6   CREATININE mg/dL 0 57* 0 63   EGFR ml/min/1 73sq m 119 115   CALCIUM mg/dL 8 3* 8 7     Results from last 7 days   Lab Units 08/01/20  1727   AST U/L 18   ALT U/L 20   ALK PHOS U/L 53   TOTAL PROTEIN g/dL 6 9   ALBUMIN g/dL 3 6   TOTAL BILIRUBIN mg/dL 0 50         Results from last 7 days   Lab Units 08/02/20  0520 08/01/20  1727   GLUCOSE RANDOM mg/dL 98 98       Results from last 7 days   Lab Units 08/01/20  1730   PROTIME seconds 14 9*   INR  1 16   PTT seconds 34             Results from last 7 days   Lab Units 08/01/20  1730   LACTIC ACID mmol/L 0 6*       Results from last 7 days   Lab Units 08/01/20  1733   CLARITY UA  Cloudy*   COLOR UA  Yellow   SPEC GRAV UA  1 010   PH UA  7 0   GLUCOSE UA mg/dl Negative   KETONES UA mg/dl Negative   BLOOD UA  50 0*   PROTEIN UA mg/dl 30 (1+)*   NITRITE UA  Positive*   BILIRUBIN UA  Negative   UROBILINOGEN UA mg/dL 1 0   LEUKOCYTES UA  500 0*   WBC UA /hpf 10-20*   RBC UA /hpf 0-1*   BACTERIA UA /hpf Moderate*   EPITHELIAL CELLS WET PREP /hpf Moderate*     ED Treatment:   Medication Administration from 08/01/2020 1559 to 08/01/2020 2141       Date/Time Order Dose Route Action     08/01/2020 1736 sodium chloride 0 9 % bolus 1,000 mL 1,000 mL Intravenous New Bag     08/01/2020 1819 sodium chloride 0 9 % bolus 1,000 mL 1,000 mL Intravenous New Bag     08/01/2020 1707 acetaminophen (TYLENOL) tablet 975 mg 975 mg Oral Given     08/01/2020 1818 cefTRIAXone (ROCEPHIN) IVPB (premix) 1,000 mg 50 mL 1,000 mg Intravenous New Bag        History reviewed  No pertinent past medical history  Present on Admission:  **None**      Admitting Diagnosis:     Pregnancy [Z34 90]  Pyelonephritis [N12]  Sepsis ([facility] [address] ) [A41 9]  Possible urinary tract infection [R39 89]    Age/Sex: 38 y o  female       Admission Orders:    Scheduled Medications:      cefTRIAXone, 1,000 mg, Intravenous, Q24H  enoxaparin, 40 mg, Subcutaneous, Daily      Continuous IV Infusions:  sodium chloride, 125 mL/hr, Intravenous, Continuous      PRN Meds:  acetaminophen, 650 mg, Oral, Q6H PRN        Network Utilization Review Department  [email or web address]  ATTENTION: Please call with any questions or concerns to [phone] and carefully listen to the prompts so that you are directed to the right person  All voicemails are confidential   [facility] all requests for admission clinical reviews, approved or denied determinations and any other requests to dedicated fax number below belonging to the campus where the patient is receiving treatment   List of dedicated fax numbers for the Facilities:  [address] [place] [address] DENIALS (Administrative/Medical Necessity) [phone]   [address] (Maternity/NICU/Pediatrics) [phone]   [facility] [phone]   130 [facility] [phone]   [address] [place] [address] [phone]   145 [facility]  [phone]   1205 [facility] 1525 [facility] [phone]   [facility]  [phone]   2205 [facility]  2401 [facility] [address] [facility] [phone]

## 2020-08-02 NOTE — ASSESSMENT & PLAN NOTE
Patient incidentally found to have positive hCG with pregnancy confirmed via U/S at 6 weeks gestation  Patient is sexually active with one partner and last had intercourse approximately 2 weeks ago  No concerns for STDs at this time  - F/U obstetrics panel    - Following discussion with OB/GYN on call, per ACOG recommendations patient will require 100 mg nitrofurantoin nightly throughout remainder of pregnancy as prophylaxis

## 2020-08-02 NOTE — ASSESSMENT & PLAN NOTE
- Sepsis secondary to pyelonephritis, complicated by 1st trimester pregnancy  - Decreased breath sounds b/l lung bases, no cough, SOB, CP  - Afebrile, hemodynamically stable  - IV-Fluids stopped; tolerating po intake  - Leukocytosis is trending down 15 2 --> 10 4; procalcitonin normal  - Ucx: 40,000-49,000 cfu/ml Gram Negative Drew Enteric Like    - Continue Ceftriaxone 1g Q24  - Follow up blood cultures and sensitivities  - OOB to ambulate  - Incentive spirometry  - Monitor fever curve; Tylenol 650 mg Q6 PRN for fever and pain control  - CBC/BMP in the AM

## 2020-08-02 NOTE — ASSESSMENT & PLAN NOTE
- Incidental positive HCG, confirmed via U/S at 6 weeks gestation    - Patient is sexually active with one partner and last had intercourse approximately 2 weeks ago  No concerns for STDs at this time      - Obstetric panel: HBsAg - unreactive, Rubella - positive, F/U on RPR and HIV

## 2020-08-02 NOTE — H&P
H&P- Nikanereyda Wright Anais 1985, 38 y o  female MRN: [id number]    Unit/Bed#: 7T [facility] 708-02 Encounter: [id number]    Primary Care Provider: Angelique Tapia MD   Date and time admitted to hospital: 8/1/2020  4:16 PM      Assessment and Plan    * Sepsis [facility])  Assessment & Plan  - Patient meets sepsis criteria on admisson: fever (103), leukocytosis (14 4), tachycardia (124) and confirmed source of infection (UA positive for leukocytes and nitrites)  - History significant for one day history of dysuria accompanied by fevers, nausea and decreased appetite  Physical examination also notable for suprapubic and left sided CVA tenderness  Given these constellation of findings this is consistent with sepsis secondary to pyelonephritis  - Patient received 2L NS boluses in the ED as well as one dose of Ceftriaxone 1g Q24    - Will continue current antibiotic regimen   - Follow up blood and urine cultures  - Follow up procalcitonin   - Tylenol 650 mg Q6 PRN for fever and pain control  - Will monitor fever curve  - CBC in the AM to trend WBC count given leukocytosis on admission   - Continue IV fluid administration with 125 cc/hr normal saline     Pyelonephritis affecting pregnancy in first trimester  Assessment & Plan  - Continue plan as outlined above   - Patient did have an ultrasound of the kidneys and bladder which was significant for horseshoe kidney but no stones or hydronephrosis identified     - Following discussion with OB/GYN on call, per ACOG recommendations patient will require 100 mg nitrofurantoin nightly throughout remainder of pregnancy as prophylaxis  Less than 8 weeks gestation of pregnancy  Assessment & Plan  Patient incidentally found to have positive hCG with pregnancy confirmed via U/S at 6 weeks gestation  Patient is sexually active with one partner and last had intercourse approximately 2 weeks ago  No concerns for STDs at this time      - F/U obstetrics panel    - Following discussion with OB/GYN on call, per ACOG recommendations patient will require 100 mg nitrofurantoin nightly throughout remainder of pregnancy as prophylaxis  VTE Prophylaxis: Enoxaparin (Lovenox)  Code Status: Level 1 - Full Code  Anticipated Length of Stay:  Patient will be admitted on an Observation basis with an anticipated length of stay of  less than 2 midnights  Justification for Hospital Stay: Sepsis  Total Time for Visit, including Counseling / Coordination of Care: 45 mins  Greater than 50% of this total time spent on direct patient counseling and coordination of care  Chief Complaint:     Chief Complaint   Patient presents with    Possible UTI     pt  states that she has fever, lower back pain, pain with urination voiding in small amounts  - last dose of Tylenol was at 0900     History of Present Illness:    Ranjit Adams is a 38 y o  female with a PMH for gastric bypass who initially presented to the Emergency department with a chief complaint of one day history of fever, nausea, decreased appetite, lower back pain and dysuria  In addition patient was also complaining of increased urinary frequency (voiding approximately every 30 minutes), cloudy urine and suprapubic tenderness  She however denied any hematuria  Patient denies ever having had a similar episode in the past      Patient admits to having one sexual partner with last sexual encounter roughly two weeks ago  Patient denies alcohol, drug, and tobacco use  Of note, patient's estranged  entered the room during interview at which point patient requested that he leave  She denied domestic violence or fearing for her safety when asked  With regards to her gastric bypass surgery, this occurred approximately 2 years ago  Per chart review patient is meant to be on multivitamins however is not currently on any medications       Due to language barrier, an  was present during the history-taking and subsequent discussion (and for part of the physical exam) with this patient  Review of Systems:  Review of Systems   Constitutional: Positive for appetite change (Patient has only been able to tolerate small amounts of food) and fever  Negative for chills  Respiratory: Negative for chest tightness and shortness of breath  Cardiovascular: Negative for chest pain and leg swelling  Gastrointestinal: Positive for abdominal pain (Patient endorses suprapubic pain)  Negative for abdominal distention, blood in stool, constipation, diarrhea and vomiting  Genitourinary: Positive for difficulty urinating, dysuria and frequency  Negative for hematuria  Musculoskeletal: Positive for back pain (Patient endorses lower back pain)  Neurological: Negative for dizziness, syncope and headaches  Psychiatric/Behavioral: Negative for agitation, behavioral problems and confusion  Past Medical and Surgical History:   History reviewed  No pertinent past medical history  Past Surgical History:   Procedure Laterality Date     SECTION       Meds/Allergies:   Allergies: No Known Allergies  None     Social History:     Social History     Socioeconomic History    Marital status: Single     Spouse name: Not on file    Number of children: Not on file    Years of education: Not on file    Highest education level: Not on file   Occupational History    Not on file   Social Needs    Financial resource strain: Not on file    Food insecurity:     Worry: Not on file     Inability: Not on file    Transportation needs:     Medical: Not on file     Non-medical: Not on file   Tobacco Use    Smoking status: Never Smoker    Smokeless tobacco: Never Used   Substance and Sexual Activity    Alcohol use: Not Currently    Drug use: Never    Sexual activity: Not on file   Lifestyle    Physical activity:     Days per week: Not on file     Minutes per session: Not on file    Stress: Not on file   Relationships    Social connections: Talks on phone: Not on file     Gets together: Not on file     Attends Jainism service: Not on file     Active member of club or organization: Not on file     Attends meetings of clubs or organizations: Not on file     Relationship status: Not on file    Intimate partner violence:     Fear of current or ex partner: Not on file     Emotionally abused: Not on file     Physically abused: Not on file     Forced sexual activity: Not on file   Other Topics Concern    Not on file   Social History Narrative    Not on file     Patient Pre-hospital Living Situation: Unknown  Patient Pre-hospital Level of Mobility: Mobile  Patient Pre-hospital Diet Restrictions: Unknown    Family History:  History reviewed  No pertinent family history  Physical Exam:   Vitals:   Blood Pressure: 108/72 (08/01/20 2257)  Pulse: 95 (08/01/20 2257)  Temperature: 97 5 °F (36 4 °C) (08/01/20 2257)  Temp Source: Temporal (08/01/20 2257)  Respirations: 18 (08/01/20 2257)  Height: 4' 11" (149 9 cm) (08/01/20 2144)  Weight - Scale: 66 5 kg (146 lb 9 7 oz) (08/01/20 2144)  SpO2: 98 % (08/01/20 2257)    Physical Exam   Constitutional: She is oriented to person, place, and time  She appears well-developed and well-nourished  HENT:   Head: Normocephalic and atraumatic  Eyes: Pupils are equal, round, and reactive to light  Conjunctivae and EOM are normal    Neck: Normal range of motion  Cardiovascular: Normal rate, regular rhythm and normal heart sounds  Pulmonary/Chest: Effort normal and breath sounds normal    Abdominal: Soft  Bowel sounds are normal  There is tenderness (Patient exhibits suprapubic pain on palpation)  There is CVA tenderness (Left sided)  Musculoskeletal: She exhibits no edema  Neurological: She is alert and oriented to person, place, and time  Lab Results: I have personally reviewed pertinent reports      Results from last 7 days   Lab Units 08/01/20  1727   WBC Thousand/uL 14 40*   HEMOGLOBIN g/dL 11 6* HEMATOCRIT % 33 7*   PLATELETS Thousands/uL 268   NEUTROS PCT % 78*   LYMPHS PCT % 11*   MONOS PCT % 11*   EOS PCT % 0     Results from last 7 days   Lab Units 08/01/20  1727   POTASSIUM mmol/L 3 7   CHLORIDE mmol/L 99   CO2 mmol/L 23   BUN mg/dL 6   CREATININE mg/dL 0 63   CALCIUM mg/dL 8 7   ALK PHOS U/L 53   ALT U/L 20   AST U/L 18   EGFR ml/min/1 73sq m 115     Results from last 7 days   Lab Units 08/01/20  1730   INR  1 16         Results from last 7 days   Lab Units 08/01/20  1730   LACTIC ACID mmol/L 0 6*              Results from last 7 days   Lab Units 08/01/20  1733   COLOR UA  Yellow   CLARITY UA  Cloudy*   SPEC GRAV UA  1 010   PH UA  7 0   LEUKOCYTES UA  500 0*   NITRITE UA  Positive*   GLUCOSE UA mg/dl Negative   KETONES UA mg/dl Negative   BILIRUBIN UA  Negative   BLOOD UA  50 0*      Results from last 7 days   Lab Units 08/01/20  1733   RBC UA /hpf 0-1*   WBC UA /hpf 10-20*   EPITHELIAL CELLS WET PREP /hpf Moderate*   BACTERIA UA /hpf Moderate*        Imaging: I have personally reviewed pertinent reports  Us Kidney And Bladder    Result Date: 8/1/2020  Narrative: RENAL ULTRASOUND INDICATION:   rule out stone, fever, flank pain  COMPARISON: None TECHNIQUE:   Ultrasound of the retroperitoneum was performed with a curvilinear transducer utilizing volumetric sweeps and still imaging techniques  FINDINGS: KIDNEYS: Horseshoe kidney noted  Symmetric and normal size  Right renal moiety:  10 9 cm  Left renal moiety:  9 8 cm  Right kidney Normal echogenicity and contour  No suspicious masses detected  No hydronephrosis  No shadowing calculi  No perinephric fluid collections  Left kidney Normal echogenicity and contour  No suspicious masses detected  No hydronephrosis  No shadowing calculi  No perinephric fluid collections  URETERS: Nonvisualized  BLADDER: Normally distended  No focal thickening or mass lesions  Bilateral ureteral jets detected  Impression: Horseshoe kidney noted   No stones or hydronephrosis identified  Workstation performed: [id number]     Us Pelvis Complete W Transvaginal    Result Date: 8/1/2020  Narrative: FIRST TRIMESTER OBSTETRIC ULTRASOUND, COMPLETE INDICATION:  LMP is 7/11/2020   COMPARISON: None  TECHNIQUE:   Transabdominal ultrasound of the pelvis was performed  Additional transvaginal imaging was then performed to better assess the gestation, myometrial/endometrial architecture and ovarian parenchymal detail  The study includes volumetric sweeps and traditional still imaging technique  FINDINGS: A single live intrauterine gestation is identified  Cardiac activity is present  Heart rate of 179 bpm  YOLK SAC:  Present and normal in size and appearance  MEAN GESTATIONAL SAC SIZE: 18 mm = 6 weeks 4 days MEAN CROWN RUMP LENGTH:  6 mm = 6 weeks 3 days FETAL ANATOMY:  Appropriate for gestational age  AMNIOTIC FLUID/SAC SHAPE:  Within expected normal range  PLACENTA:  The placenta is appropriate for gestational age  Small subchorionic hemorrhage identified  UTERUS/ADNEXA: The uterus and ovaries are within normal limits  The cervix remains closed  No free fluid present  Impression: Single live intrauterine gestation at 6 weeks 3 days  Workstation performed: [id number]       EKG, Pathology, and Other Studies Reviewed on Admission:   EKG  Result Date: 08/01/20  Impression:  EKG was reviewed  Patient found to have sinus tachycardia  No ST changes  No interval changes       Entire H&P was discussed with Dr Martin Dimas who agreed to what is noted above    Sam Wiseman MD  08/01/20  11:41 PM

## 2020-08-02 NOTE — PLAN OF CARE
Problem: PAIN - ADULT  Goal: Verbalizes/displays adequate comfort level or baseline comfort level  Description  Interventions:  - Encourage patient to monitor pain and request assistance  - Assess pain using appropriate pain scale  - Administer analgesics based on type and severity of pain and evaluate response  - Implement non-pharmacological measures as appropriate and evaluate response  - Consider cultural and social influences on pain and pain management  - Notify physician/advanced practitioner if interventions unsuccessful or patient reports new pain  Outcome: Progressing     Problem: INFECTION - ADULT  Goal: Absence or prevention of progression during hospitalization  Description  INTERVENTIONS:  - Assess and monitor for signs and symptoms of infection  - Monitor lab/diagnostic results  - Monitor all insertion sites, i e  indwelling lines, tubes, and drains  - Monitor endotracheal if appropriate and nasal secretions for changes in amount and color  - [place] appropriate cooling/warming therapies per order  - Administer medications as ordered  - Instruct and encourage patient and family to use good hand hygiene technique  - Identify and instruct in appropriate isolation precautions for identified infection/condition  Outcome: Progressing     Problem: DISCHARGE PLANNING  Goal: Discharge to home or other facility with appropriate resources  Description  INTERVENTIONS:  - Identify barriers to discharge w/patient and caregiver  - Arrange for needed discharge resources and transportation as appropriate  - Identify discharge learning needs (meds, wound care, etc )  - Arrange for interpretive services to assist at discharge as needed  - Refer to Case Management Department for coordinating discharge planning if the patient needs post-hospital services based on physician/advanced practitioner order or complex needs related to functional status, cognitive ability, or social support system  Outcome: Progressing Problem: Knowledge Deficit  Goal: Patient/family/caregiver demonstrates understanding of disease process, treatment plan, medications, and discharge instructions  Description  Complete learning assessment and assess knowledge base    Interventions:  - Provide teaching at level of understanding  - Provide teaching via preferred learning methods  Outcome: Progressing     Problem: GASTROINTESTINAL - ADULT  Goal: Minimal or absence of nausea and/or vomiting  Description  INTERVENTIONS:  - Administer IV fluids if ordered to ensure adequate hydration  - Maintain NPO status until nausea and vomiting are resolved  - Nasogastric tube if ordered  - Administer ordered antiemetic medications as needed  - Provide nonpharmacologic comfort measures as appropriate  - Advance diet as tolerated, if ordered  - Consider nutrition services referral to assist patient with adequate nutrition and appropriate food choices  Outcome: Progressing  Goal: Maintains adequate nutritional intake  Description  INTERVENTIONS:  - Monitor percentage of each meal consumed  - Identify factors contributing to decreased intake, treat as appropriate  - Assist with meals as needed  - Monitor I&O, weight, and lab values if indicated  - Obtain nutrition services referral as needed  Outcome: Progressing     Problem: GENITOURINARY - ADULT  Goal: Maintains or returns to baseline urinary function  Description  INTERVENTIONS:  - Assess urinary function  - Encourage oral fluids to ensure adequate hydration if ordered  - Administer IV fluids as ordered to ensure adequate hydration  - Administer ordered medications as needed  - Offer frequent toileting  - Follow urinary retention protocol if ordered  Outcome: Progressing     Problem: METABOLIC, FLUID AND ELECTROLYTES - ADULT  Goal: Electrolytes maintained within normal limits  Description  INTERVENTIONS:  - Monitor labs and assess patient for signs and symptoms of electrolyte imbalances  - Administer electrolyte replacement as ordered  - Monitor response to electrolyte replacements, including repeat lab results as appropriate  - Instruct patient on fluid and nutrition as appropriate  Outcome: Progressing  Goal: Fluid balance maintained  Description  INTERVENTIONS:  - Monitor labs   - Monitor I/O and WT  - Instruct patient on fluid and nutrition as appropriate  - Assess for signs & symptoms of volume excess or deficit  Outcome: Progressing

## 2020-08-02 NOTE — PROGRESS NOTES
Progress Note    Nai Manzo 38 y o  female MRN: [id number]  Unit/Bed#: 7T [facility]08-02 Encounter: [id number]  Admitting Physician: Susie Her MD  PCP: Sophia Suggs MD  Date of Admission:  8/1/2020  4:16 PM    Assessment and Plan    * Sepsis [facility])  Assessment & Plan  Admitted for Sepsis secondary to pylonephritis and complicated by 1st trimester pregnancy  Met SIRS criteria w/ fever (103), leukocytosis (14 4), tachycardia (124)   UA positive for leukocytes and nitrites  Improving clinically as pain has subsided and patient is tolerating po intake  Leukocytosis is trending upwards 14 4->15 2  Spiked a fever last night @6am but afebrile after receiving tylenol    - Continue Ceftriaxone 1g Q24 and fluid resuscitation at 125 cc/ hour  - Follow up blood and urine cultures  - Follow up procalcitonin   - Tylenol 650 mg Q6 PRN for fever and pain control  - Monitor fever curve  - CBC/BMP in the AM       Pyelonephritis affecting pregnancy in first trimester  Assessment & Plan  - Continue plan as outlined above   - Patient did have an ultrasound of the kidneys and bladder which was significant for horseshoe kidney but no stones or hydronephrosis identified     - Following discussion with OB/GYN on call, per ACOG recommendations patient will require 100 mg nitrofurantoin nightly throughout remainder of pregnancy as prophylaxis  Less than 8 weeks gestation of pregnancy  Assessment & Plan  Patient incidentally found to have positive hCG with pregnancy confirmed via U/S at 6 weeks gestation  Patient is sexually active with one partner and last had intercourse approximately 2 weeks ago  No concerns for STDs at this time  - F/U obstetrics panel    - Following discussion with OB/GYN on call, per ACOG recommendations patient will require 100 mg nitrofurantoin nightly throughout remainder of pregnancy as prophylaxis           VTE Pharmacologic Prophylaxis:   Pharmacologic: Enoxaparin (Lovenox)  Mechanical VTE Prophylaxis in Place: No    Patient Centered Rounds: I have performed bedside rounds with nursing staff today  Discussions with Specialists or Other Care Team Provider: None    Education and Discussions with Family / Patient: Educated on pyelonephritis    Time Spent for Care: 30 minutes  More than 50% of total time spent on counseling and coordination of care as described above  Current Length of Stay: 0 day(s)    Current Patient Status: Observation   Certification Statement: The patient will continue to require additional inpatient hospital stay due to Needs IV antiiotics for pyelonephritis    Discharge Plan: Need to be afebrile for at least 24-48 hours and white blood cell trending down    Code Status: Level 1 - Full Code      Subjective:   Examined at bedside, in no acute distress and resting comfortably  She states that she feels much better than when she came in  She still has a little bit of pain on the left side  She denies any any nausea, vomiting, chest pain, shortness of breath and leg pain    Objective:     Vitals:   Temp (24hrs), Av 6 °F (37 6 °C), Min:97 3 °F (36 3 °C), Max:103 °F (39 4 °C)    Temp:  [97 3 °F (36 3 °C)-103 °F (39 4 °C)] 97 3 °F (36 3 °C)  HR:  [] 95  Resp:  [18-26] 18  BP: ()/(59-75) 100/65  SpO2:  [97 %-100 %] 100 %  Body mass index is 29 61 kg/m²  Input and Output Summary (last 24 hours): Intake/Output Summary (Last 24 hours) at 2020 1031  Last data filed at 2020 0604  Gross per 24 hour   Intake 3066 67 ml   Output    Net 3066 67 ml       Physical Exam:     Physical Exam   Constitutional: She is oriented to person, place, and time  She appears well-developed and well-nourished  HENT:   Head: Normocephalic and atraumatic  Eyes: Pupils are equal, round, and reactive to light  EOM are normal  Right eye exhibits no discharge  Left eye exhibits no discharge     Cardiovascular: Regular rhythm, S1 normal, S2 normal and normal heart sounds  Exam reveals no gallop and no friction rub  No murmur heard  Pulmonary/Chest: Effort normal and breath sounds normal  No stridor  No respiratory distress  She has no wheezes  Abdominal: Soft  There is no abdominal tenderness  Musculoskeletal: Normal range of motion  General: No deformity or edema  Neurological: She is alert and oriented to person, place, and time  Skin: Skin is warm  Additional Data:     Labs:    Results from last 7 days   Lab Units 08/02/20  0520 08/01/20  1727   WBC Thousand/uL 15 20* 14 40*   HEMOGLOBIN g/dL 12 0 11 6*   HEMATOCRIT % 36 0 33 7*   PLATELETS Thousands/uL 252 268   NEUTROS PCT %  --  78*   LYMPHS PCT %  --  11*   LYMPHO PCT % 18*  --    MONOS PCT %  --  11*   MONO PCT % 6  --    EOS PCT % 1 0     Results from last 7 days   Lab Units 08/02/20  0520 08/01/20  1727   POTASSIUM mmol/L 4 0 3 7   CHLORIDE mmol/L 104 99   CO2 mmol/L 24 23   BUN mg/dL 4* 6   CREATININE mg/dL 0 57* 0 63   CALCIUM mg/dL 8 3* 8 7   ALK PHOS U/L  --  53   ALT U/L  --  20   AST U/L  --  18     Results from last 7 days   Lab Units 08/01/20  1730   INR  1 16                 * I Have Reviewed All Lab Data Listed Above  * Additional Pertinent Lab Tests Reviewed: All Labs Within Last 24 Hours Reviewed    Imaging:    Imaging Reports Reviewed Today Include: US kidney and bladder  Imaging Personally Reviewed by Myself Includes:  US kidney bladder    Recent Cultures (last 7 days):           Last 24 Hours Medication List:   acetaminophen, 650 mg, Oral, Q6H PRN, Lidia Pisano MD  cefTRIAXone, 1,000 mg, Intravenous, Q24H, Lidia Pisano MD  enoxaparin, 40 mg, Subcutaneous, Daily, Lidia Pisano MD  sodium chloride, 125 mL/hr, Intravenous, Continuous, Lidia Pisano MD, Last Rate: 125 mL/hr (08/02/20 0604)         ** Please Note: Dictation voice to text software may have been used in the creation of this document   Luanne Saldivar MD  08/02/20  10:31 AM

## 2020-08-02 NOTE — ASSESSMENT & PLAN NOTE
- Continue plan as outlined above   - Patient did have an ultrasound of the kidneys and bladder which was significant for horseshoe kidney but no stones or hydronephrosis identified     - Following discussion with OB/GYN on call, per ACOG recommendations patient will require 100 mg nitrofurantoin nightly throughout remainder of pregnancy as prophylaxis

## 2020-08-02 NOTE — NURSING NOTE
Patient states she is feeling much better today and denies pain  TTP to LLQ  Voiding adequately  Residents called by RN to clarify safety of Lovenox as patient is 6 weeks pregnant  Patient encouraged to ambulate and is utilizing SCDs  Patient is tolerating diet  Patient is presently resting comfortably in bed, call bell in reach

## 2020-08-02 NOTE — DISCHARGE SUMMARY
Discharge- Mayito Alvarezmalinda 1985, 38 y o  female MRN: [id number]    Unit/Bed#: 7[facility] 708-02 Encounter: [id number]    Primary Care Provider: Ronan Tavarez MD   Date and time admitted to hospital: 8/1/2020  4:16 PM        Less than 8 weeks gestation of pregnancy  Assessment & Plan  - Incidental positive HCG, confirmed via U/S at 6 weeks gestation    - Patient is sexually active with one partner and last had intercourse approximately 2 weeks ago  No concerns for STDs at this time      - Obstetric panel: HBsAg - unreactive, Rubella - positive, F/U on RPR and HIV      Pyelonephritis affecting pregnancy in first trimester  Assessment & Plan  - No fever, CVA tenderness, n/v/ suprapubic tenderness/ distension  - Afebrile, hemodynamically stable  - WBC trended down to 10 4   - Urine Cx: 40,000-49,000 cfu/ml Gram Negative Drew Enteric Like    - Will require Prophylactic 100 mg nitrofurantoin night throughout remainder of pregnancy, as per ACOG guidelines  - Monitor fever curve; Tylenol 650 mg Q6 PRN for fever and pain control           Discharging Physician / Practitioner: Bonny Hager MD  PCP: Ronan Tavarez MD  Admission Date:   Admission Orders (From admission, onward)     Ordered        08/03/20 1743  Inpatient Admission  Once         08/01/20 2049  Place in Observation (expected length of stay for this patient is less than two midnights)  Once                   Discharge Date: 8/4/2020    Reason for Admission: Sepsis 2/2 pyelonephritis     Discharge Diagnoses:     Principal Problem (Resolved):    Sepsis ([address] )  Active Problems:    Pyelonephritis affecting pregnancy in first trimester    Less than 8 weeks gestation of pregnancy  Resolved Problems:    Decreased breath sounds      Consultations During Hospital Stay:  · None     Procedures Performed:   · None    Significant Findings / Test Results:   · Intrauterine pregnancy 6w3d on 8/1/2020    Incidental Findings:   · Horseshoe kidney noted on US     Test Results Pending at Discharge (will require follow up):   HBsAg - unreactive, Rubella - positive, F/U on RPR and HIV       Outpatient Tests Requested:  · OB panel    Outpatient follow-up Requested:   OB/GYN    PCP    Complications:  None    Hospital Course:     Jose A Chapa is a 38 y o  female with a PMH for gastric bypass  patient who originally presented to the hospital on 8/1/2020 due one day history of fever, nausea, decreased appetite, lower back pain and dysuria, urinary frequency and suprapubic pain  At the ED, she was febrile 103F, tachycardic 124 and labs showed leukocytosis 14 4 and hyponatremia of 131 and positive UA  Admitted for sepsis 8/1-8/4 due to pyelonephritis  She was incidentally found to be 6 weeks pregnant; confirmed via ultrasound  Throughout the course of her stay she continued to improve, received 3 doses of ceftriaxone while inpatient, on day of discharge patient felt as if she was at her baseline with no complaints of pain and was agreed upon to discharge home  She was instructed to follow-up with PCP and establish care with OB, she was given a prescription to take Vantin 100mg bid For 4 days for a total of 7 days of treatment for her pyelonephritis and once that is completed she is to continue taking Macrobid 1 tablet daily per ACOG recommendations  Behaviorist met with patient on day of discharge, there were no concerns for abuse at this time, patient states she is safe at home, is agreeable to follow-up with social work and see a therapist on an outpatient basis  All questions and concerns were addressed        Condition at Discharge: stable     Discharge Day Visit / Exam:     Vitals: Blood Pressure: 92/66 (08/04/20 0744)  Pulse: 74 (08/04/20 0744)  Temperature: (!) 96 8 °F (36 °C) (08/04/20 0744)  Temp Source: Temporal (08/04/20 0744)  Respirations: 18 (08/04/20 0744)  Height: 4' 11" (08/01/20 8838)  Weight - Scale: 66 5 kg (146 lb 9 7 oz) (08/01/20 2144)  SpO2: 98 % (08/04/20 0744)  Exam:   Physical Exam   Constitutional: She is oriented to person, place, and time  She appears well-developed and well-nourished  No distress  HENT:   Head: Normocephalic and atraumatic  Mouth/Throat: Oropharynx is clear and moist  No oropharyngeal exudate  Eyes: Conjunctivae are normal  Right eye exhibits no discharge  Left eye exhibits no discharge  No scleral icterus  Neck: Neck supple  Cardiovascular: Normal rate, regular rhythm, S1 normal, S2 normal, normal heart sounds and intact distal pulses  Pulses are palpable  No murmur heard  Pulmonary/Chest: Effort normal  No respiratory distress  She has no wheezes  She exhibits no tenderness  Decreased breath sounds b/l lung bases   Abdominal: Soft  Bowel sounds are normal  She exhibits no distension  There is no abdominal tenderness  There is no guarding  No CVA tenderness   Musculoskeletal:         General: No tenderness, deformity or edema  Lymphadenopathy:     She has no cervical adenopathy  Neurological: She is alert and oriented to person, place, and time  Skin: Skin is warm  Capillary refill takes less than 2 seconds  No rash noted  She is not diaphoretic  Psychiatric: She has a normal mood and affect  Her behavior is normal  Judgment and thought content normal    Nursing note and vitals reviewed  Discussion with Family:  No    Discharge instructions/Information to patient and family:   See after visit summary for information provided to patient and family  Discharge Medications:     Vantin 100mg BID     100 mg Oral 2 times daily for 4 days    Then Macrobid     100 mg Oral Daily at bedtime afterwards          Provisions for Follow-Up Care:  See after visit summary for information related to follow-up care and any pertinent home health orders        Disposition:     Home    For Discharges to [facility] SNF:   · Not Applicable to this Patient - Not Applicable to this Patient    Planned Readmission: No     Discharge Statement:  I spent 45 minutes discharging the patient  This time was spent on the day of discharge  I had direct contact with the patient on the day of discharge  Greater than 50% of the total time was spent examining patient, answering all patient questions, arranging and discussing plan of care with patient as well as directly providing post-discharge instructions  Additional time then spent on discharge activities      ** Please Note: This note has been constructed using a voice recognition system **    Jesus Kuhn MD  08/04/20  5:58 PM

## 2020-08-02 NOTE — ASSESSMENT & PLAN NOTE
- No fever, CVA tenderness, n/v/ suprapubic tenderness/ distension  - Afebrile, hemodynamically stable  - WBC trended down to 10 4   - Urine Cx: 40,000-49,000 cfu/ml Gram Negative Drew Enteric Like    - Will require Prophylactic 100 mg nitrofurantoin @ night throughout remainder of pregnancy, as per ACOG guidelines  - Monitor fever curve; Tylenol 650 mg Q6 PRN for fever and pain control

## 2020-08-03 PROBLEM — R06.89 DECREASED BREATH SOUNDS: Status: ACTIVE | Noted: 2020-08-03

## 2020-08-03 PROBLEM — A41.9 SEPSIS (HCC): Status: RESOLVED | Noted: 2020-08-01 | Resolved: 2020-08-03

## 2020-08-03 LAB
ALBUMIN SERPL BCP-MCNC: 3.2 G/DL (ref 3–5.2)
ALP SERPL-CCNC: 40 U/L (ref 43–122)
ALT SERPL W P-5'-P-CCNC: 12 U/L (ref 9–52)
ANION GAP SERPL CALCULATED.3IONS-SCNC: 6 MMOL/L (ref 5–14)
AST SERPL W P-5'-P-CCNC: 19 U/L (ref 14–36)
BILIRUB SERPL-MCNC: 0.4 MG/DL
BUN SERPL-MCNC: 2 MG/DL (ref 5–25)
CALCIUM SERPL-MCNC: 8.2 MG/DL (ref 8.4–10.2)
CHLORIDE SERPL-SCNC: 104 MMOL/L (ref 97–108)
CO2 SERPL-SCNC: 23 MMOL/L (ref 22–30)
CREAT SERPL-MCNC: 0.51 MG/DL (ref 0.6–1.2)
EOSINOPHIL # BLD AUTO: 0.1 THOUSAND/UL (ref 0–0.4)
EOSINOPHIL NFR BLD MANUAL: 1 % (ref 0–6)
ERYTHROCYTE [DISTWIDTH] IN BLOOD BY AUTOMATED COUNT: 13.5 %
GFR SERPL CREATININE-BSD FRML MDRD: 123 ML/MIN/1.73SQ M
GLUCOSE SERPL-MCNC: 83 MG/DL (ref 70–99)
HCT VFR BLD AUTO: 32.1 % (ref 36–46)
HGB BLD-MCNC: 10.6 G/DL (ref 12–16)
LYMPHOCYTES # BLD AUTO: 2.7 THOUSAND/UL (ref 0.5–4)
LYMPHOCYTES # BLD AUTO: 26 % (ref 25–45)
MCH RBC QN AUTO: 29.5 PG (ref 26–34)
MCHC RBC AUTO-ENTMCNC: 33 G/DL (ref 31–36)
MCV RBC AUTO: 89 FL (ref 80–100)
MONOCYTES # BLD AUTO: 1.77 THOUSAND/UL (ref 0.2–0.9)
MONOCYTES NFR BLD AUTO: 17 % (ref 1–10)
NEUTS BAND NFR BLD MANUAL: 3 % (ref 0–8)
NEUTS SEG # BLD: 5.82 THOUSAND/UL (ref 1.8–7.8)
NEUTS SEG NFR BLD AUTO: 53 %
PLATELET # BLD AUTO: 240 THOUSANDS/UL (ref 150–450)
PLATELET BLD QL SMEAR: ADEQUATE
PMV BLD AUTO: 9.9 FL (ref 8.9–12.7)
POTASSIUM SERPL-SCNC: 3.6 MMOL/L (ref 3.6–5)
PROT SERPL-MCNC: 6.6 G/DL (ref 5.9–8.4)
RBC # BLD AUTO: 3.59 MILLION/UL (ref 4–5.2)
RBC MORPH BLD: NORMAL
SODIUM SERPL-SCNC: 133 MMOL/L (ref 137–147)
TOTAL CELLS COUNTED SPEC: 100
WBC # BLD AUTO: 10.4 THOUSAND/UL (ref 4.5–11)

## 2020-08-03 PROCEDURE — 85007 BL SMEAR W/DIFF WBC COUNT: CPT | Performed by: FAMILY MEDICINE

## 2020-08-03 PROCEDURE — 99232 SBSQ HOSP IP/OBS MODERATE 35: CPT | Performed by: FAMILY MEDICINE

## 2020-08-03 PROCEDURE — 80053 COMPREHEN METABOLIC PANEL: CPT | Performed by: FAMILY MEDICINE

## 2020-08-03 PROCEDURE — 85027 COMPLETE CBC AUTOMATED: CPT | Performed by: FAMILY MEDICINE

## 2020-08-03 RX ADMIN — ENOXAPARIN SODIUM 40 MG: 100 INJECTION SUBCUTANEOUS at 08:22

## 2020-08-03 RX ADMIN — SODIUM CHLORIDE 125 ML/HR: 0.9 INJECTION, SOLUTION INTRAVENOUS at 02:48

## 2020-08-03 RX ADMIN — CEFTRIAXONE 1000 MG: 1 INJECTION, SOLUTION INTRAVENOUS at 17:25

## 2020-08-03 NOTE — NURSING NOTE
On initial rounds patient in no apparent distress  Skin warm and dry to touch  Pleasant and cooperative  Denies pain but verbalizes understanding of pain scale 0-10  Ambulates well by self  Tolerating diet  Denies any problem passing urine  All safety maintained  Will continue to monitor

## 2020-08-03 NOTE — PROGRESS NOTES
Progress Note    Satya Manzo 38 y o  female MRN: [id number]  Unit/Bed#: 7T [facility] 708-02 Encounter: [id number]  Admitting Physician: Drea Rocha MD  PCP: Ellen Michael MD  Date of Admission:  08/03/20    Assessment and Plan    Pyelonephritis affecting pregnancy in first trimester  Assessment & Plan  - No fever, CVA tenderness, n/v/ suprapubic tenderness/ distension  - Afebrile, hemodynamically stable  - WBC trended down to 10 4   - Urine Cx: 40,000-49,000 cfu/ml Gram Negative Drew Enteric Like  - IV-Fluids stopped; tolerating po intake    - Continue Rocephin 1000mg IV Q24h  - Will require Prophylactic 100 mg nitrofurantoin @ night throughout remainder of pregnancy, as per ACOG guidelines  - Monitor fever curve; Tylenol 650 mg Q6 PRN for fever and pain control  - CBC/BMP in the AM       Less than 8 weeks gestation of pregnancy  Assessment & Plan  - Incidental positive HCG, confirmed via U/S at 6 weeks gestation    - Patient is sexually active with one partner and last had intercourse approximately 2 weeks ago  No concerns for STDs at this time      - Obstetric panel: HBsAg - unreactive, Rubella - positive, F/U on RPR and HIV  - Will round with behaviorist tomorrow to assess mental health      * Sepsis (HCC)resolved as of 8/3/2020  Assessment & Plan  - Sepsis secondary to pyelonephritis, complicated by 1st trimester pregnancy  - Decreased breath sounds b/l lung bases, no cough, SOB, CP  - Afebrile, hemodynamically stable  - IV-Fluids stopped; tolerating po intake  - Leukocytosis is trending down 15 2 --> 10 4; procalcitonin normal  - Ucx: 40,000-49,000 cfu/ml Gram Negative Drew Enteric Like    - Continue Ceftriaxone 1g Q24  - Follow up blood cultures and sensitivities  - OOB to ambulate  - Incentive spirometry  - Monitor fever curve; Tylenol 650 mg Q6 PRN for fever and pain control  - CBC/BMP in the AM       Decreased breath sounds  Assessment & Plan  - Likely due to immobility; no cough, SOB, CP  - Afebrile, hemodynamically stable  - Leukocytosis is trending down 15 2 --> 10 4; procalcitonin normal    - Already covered with Ceftriaxone 1g Q24  - OOB to ambulate  - Incentive spirometry given  - Will monitor fever curve      VTE Pharmacologic Prophylaxis:   Pharmacologic: Enoxaparin (Lovenox)  Mechanical VTE Prophylaxis in Place: Yes    Patient Centered Rounds: I have performed bedside rounds with nursing staff today  Discussions with Specialists or Other Care Team Provider: Behavioral specialist    Education and Discussions with Family / Patient: Patient     Time Spent for Care: 45 minutes  More than 50% of total time spent on counseling and coordination of care as described above  Current Length of Stay: 0 day(s)    Current Patient Status: Observation   Certification Statement: The patient will continue to require additional inpatient hospital stay due to pending lab orders    Discharge Plan: Pyelonephritis on IV-antibiotics, awaiting sensitivities    Code Status: Level 1 - Full Code      Subjective:   Pt seen at bedside today  She has no complaints, tolerating PO and ambulating  Denies fever, chills, CP, cough, SOB, abdominourinary sxs, leg pain or swelling  Objective:     Vitals:   Temp (24hrs), Av 1 °F (36 7 °C), Min:97 7 °F (36 5 °C), Max:98 4 °F (36 9 °C)    Temp:  [97 7 °F (36 5 °C)-98 4 °F (36 9 °C)] 97 7 °F (36 5 °C)  HR:  [] 84  Resp:  [18] 18  BP: ()/(71-86) 99/71  SpO2:  [97 %-98 %] 98 %  Body mass index is 29 61 kg/m²  Input and Output Summary (last 24 hours): Intake/Output Summary (Last 24 hours) at 8/3/2020 1152  Last data filed at 8/3/2020 0900  Gross per 24 hour   Intake 2200 ml   Output    Net 2200 ml       Physical Exam:     Physical Exam   Constitutional: She is oriented to person, place, and time  She appears well-developed and well-nourished  No distress  HENT:   Head: Normocephalic and atraumatic     Mouth/Throat: Oropharynx is clear and moist  No oropharyngeal exudate  Eyes: Conjunctivae are normal  Right eye exhibits no discharge  Left eye exhibits no discharge  No scleral icterus  Neck: Neck supple  Cardiovascular: Normal rate, regular rhythm, S1 normal, S2 normal, normal heart sounds and intact distal pulses  Pulses are palpable  No murmur heard  Pulmonary/Chest: Effort normal  No respiratory distress  She has no wheezes  She exhibits no tenderness  Decreased breath sounds b/l lung bases   Abdominal: Soft  Bowel sounds are normal  She exhibits no distension  There is no abdominal tenderness  There is no guarding  No CVA tenderness   Musculoskeletal:         General: No tenderness, deformity or edema  Lymphadenopathy:     She has no cervical adenopathy  Neurological: She is alert and oriented to person, place, and time  Skin: Skin is warm  No rash noted  She is not diaphoretic  Psychiatric: She has a normal mood and affect  Her behavior is normal  Judgment and thought content normal    Nursing note and vitals reviewed  Additional Data:     Labs:    Results from last 7 days   Lab Units 08/03/20  0553  08/01/20  1727   WBC Thousand/uL 10 40   < > 14 40*   HEMOGLOBIN g/dL 10 6*   < > 11 6*   HEMATOCRIT % 32 1*   < > 33 7*   PLATELETS Thousands/uL 240   < > 268   NEUTROS PCT %  --   --  78*   LYMPHS PCT %  --   --  11*   LYMPHO PCT % 26   < >  --    MONOS PCT %  --   --  11*   MONO PCT % 17*   < >  --    EOS PCT % 1   < > 0    < > = values in this interval not displayed  Results from last 7 days   Lab Units 08/03/20  0553   POTASSIUM mmol/L 3 6   CHLORIDE mmol/L 104   CO2 mmol/L 23   BUN mg/dL 2*   CREATININE mg/dL 0 51*   CALCIUM mg/dL 8 2*   ALK PHOS U/L 40*   ALT U/L 12   AST U/L 19     Results from last 7 days   Lab Units 08/01/20  1730   INR  1 16                 * I Have Reviewed All Lab Data Listed Above  * Additional Pertinent Lab Tests Reviewed:  Renato 64 Admission Reviewed    Imaging:    Imaging Reports Reviewed Today Include: none  Imaging Personally Reviewed by Myself Includes:  none    Recent Cultures (last 7 days):     Results from last 7 days   Lab Units 08/01/20  1733 08/01/20  1731   BLOOD CULTURE   --  Received in Microbiology Lab  Culture in Progress  Received in Microbiology Lab  Culture in Progress  URINE CULTURE  40,000-49,000 cfu/ml Gram Negative Drew Enteric Like*  --        Last 24 Hours Medication List:   acetaminophen, 650 mg, Oral, Q6H PRN, Saskia James MD  cefTRIAXone, 1,000 mg, Intravenous, Q24H, Saskia James MD, Last Rate: 1,000 mg (08/02/20 1716)  enoxaparin, 40 mg, Subcutaneous, Daily, Saskia James MD         Today, Patient Was Seen By: Bela Ralph MD    ** Please Note: Dictation voice to text software may have been used in the creation of this document   Oanh Cornejo MD  08/03/20  11:52 AM

## 2020-08-03 NOTE — NURSING NOTE
Patient remain in stable condition  No change in condition from this am  All safety maintained  [facility] continue to monitor

## 2020-08-03 NOTE — UTILIZATION REVIEW
Continued Stay Review    PLEASE NOTE:    Patient UPGRADED to IP 8/3 @ [id number] from OBS 8/1 @ 2049 due to Pyelonephritis requiring IV Abx and pending CX/Sens    Start    Ordered    08/03/20 1743    Inpatient Admission  Once      Transfer Service: General Medicine       Question  Answer  Comment    Admitting Physician  MIKE SALAMANCA     Level of Care  Med Surg     Estimated length of stay  More than 2 Midnights     Certification  I certify that inpatient services are medically necessary for this patient for a duration of greater than two midnights  See H&P and MD Progress Notes for additional information about the patient's course of treatment  08/03/20 1743       Date: 8/3/20                      Current Patient Class:  IP  Current Level of Care: MedSurg    HPI:35 y o  female initially admitted on 8/1 to Observation status with and pyelonephritis affecting 1st trimester pregnancy    Assessment/Plan: Sepsis secondary to pyelonephritis, complicated by 1st trimester pregnancy  Afebrile  WBC's trending down  Decreased breath sounds b/l lung bases  Continue Rocephin 1000mg IV Q24h  Monitor fever curve   F/U on CX / sensitivities    Pertinent Labs/Diagnostic Results:     Results from last 7 days   Lab Units 08/03/20  0553 08/02/20  0520 08/01/20  1727   WBC Thousand/uL 10 40 15 20* 14 40*   HEMOGLOBIN g/dL 10 6* 12 0 11 6*   HEMATOCRIT % 32 1* 36 0 33 7*   PLATELETS Thousands/uL 240 252 268   NEUTROS ABS Thousands/µL  --   --  11 20*   TOTAL NEUT ABS Thousand/uL  --  11 25*  --    BANDS PCT %  --  20*  --        Results from last 7 days   Lab Units 08/03/20  0553 08/02/20  0520 08/01/20  1727   SODIUM mmol/L 133* 137 131*   POTASSIUM mmol/L 3 6 4 0 3 7   CHLORIDE mmol/L 104 104 99   CO2 mmol/L 23 24 23   ANION GAP mmol/L 6 9 9   BUN mg/dL 2* 4* 6   CREATININE mg/dL 0 51* 0 57* 0 63   EGFR ml/min/1 73sq m 123 119 115   CALCIUM mg/dL 8 2* 8 3* 8 7     Results from last 7 days   Lab Units 08/03/20  0553 08/01/20  1727 AST U/L 19 18   ALT U/L 12 20   ALK PHOS U/L 40* 53   TOTAL PROTEIN g/dL 6 6 6 9   ALBUMIN g/dL 3 2 3 6   TOTAL BILIRUBIN mg/dL 0 40 0 50     Results from last 7 days   Lab Units 08/03/20  0553 08/02/20  0520 08/01/20  1727   GLUCOSE RANDOM mg/dL 83 98 98     Results from last 7 days   Lab Units 08/01/20  1730   PROTIME seconds 14 9*   INR  1 16   PTT seconds 34         Results from last 7 days   Lab Units 08/01/20  1727   PROCALCITONIN ng/ml <0 05     Results from last 7 days   Lab Units 08/01/20  1730   LACTIC ACID mmol/L 0 6*     Results from last 7 days   Lab Units 08/01/20  1729   HEP B S AG  Non-reactive     Results from last 7 days   Lab Units 08/01/20  1733   CLARITY UA  Cloudy*   COLOR UA  Yellow   SPEC GRAV UA  1 010   PH UA  7 0   GLUCOSE UA mg/dl Negative   KETONES UA mg/dl Negative   BLOOD UA  50 0*   PROTEIN UA mg/dl 30 (1+)*   NITRITE UA  Positive*   BILIRUBIN UA  Negative   UROBILINOGEN UA mg/dL 1 0   LEUKOCYTES UA  500 0*   WBC UA /hpf 10-20*   RBC UA /hpf 0-1*   BACTERIA UA /hpf Moderate*   EPITHELIAL CELLS WET PREP /hpf Moderate*     Results from last 7 days   Lab Units 08/01/20  1733 08/01/20  1731   BLOOD CULTURE   --  Received in Microbiology Lab  Culture in Progress  Received in Microbiology Lab  Culture in Progress     URINE CULTURE  40,000-49,000 cfu/ml Gram Negative Drew Enteric Like*  --      Vital Signs:   08/03/20 0725   97 7 °F (36 5 °C)   84   18   99/71  98 %   None (Room air)  Lying    08/02/20 2300   98 1 °F (36 7 °C)   102   18   120/86  97 %   None (Room air)  Lying    08/02/20 1500   98 4 °F (36 9 °C)   104   18   110/72  97 %   None (Room air)  Sitting      Medications:   Scheduled Medications:  cefTRIAXone, 1,000 mg, Intravenous, Q24H  enoxaparin, 40 mg, Subcutaneous, Daily    Continuous IV Infusions:  NSS @ 125 / hr  DC'd tHIs aM  PRN Meds:  acetaminophen, 650 mg, Oral, Q6H PRN    Discharge Plan: TBD    Network Utilization Review Department  [email or web address]  ATTENTION: Please call with any questions or concerns to [phone] and carefully listen to the prompts so that you are directed to the right person  All voicemails are confidential   Ty Limb all requests for admission clinical reviews, approved or denied determinations and any other requests to dedicated fax number below belonging to the campus where the patient is receiving treatment   List of dedicated fax numbers for the Facilities:  [address] DENIALS (Administrative/Medical Necessity) [phone]   [address] (Maternity/NICU/Pediatrics) [phone]   [facility] [phone]   Andreina Ireland [phone]   Yumiko Heady [phone]   Hettie Lips [phone]   [address] [place] [address] [phone]   [facility]  [phone]   [address] [facility] [address]  [address] [facility] [address] [facility] [phone]

## 2020-08-03 NOTE — ASSESSMENT & PLAN NOTE
- Likely due to immobility; no cough, SOB, CP  - Afebrile, hemodynamically stable  - Leukocytosis is trending down 15 2 --> 10 4; procalcitonin normal    - Already covered with Ceftriaxone 1g Q24  - OOB to ambulate  - Incentive spirometry given  - Will monitor fever curve

## 2020-08-04 ENCOUNTER — DOCUMENTATION (OUTPATIENT)
Dept: FAMILY MEDICINE CLINIC | Facility: CLINIC | Age: 35
End: 2020-08-04

## 2020-08-04 VITALS
OXYGEN SATURATION: 98 % | WEIGHT: 146.61 LBS | BODY MASS INDEX: 29.56 KG/M2 | SYSTOLIC BLOOD PRESSURE: 92 MMHG | RESPIRATION RATE: 18 BRPM | TEMPERATURE: 96.8 F | HEIGHT: 59 IN | HEART RATE: 74 BPM | DIASTOLIC BLOOD PRESSURE: 66 MMHG

## 2020-08-04 DIAGNOSIS — O23.01 PYELONEPHRITIS AFFECTING PREGNANCY IN FIRST TRIMESTER: Primary | ICD-10-CM

## 2020-08-04 PROBLEM — R06.89 DECREASED BREATH SOUNDS: Status: RESOLVED | Noted: 2020-08-03 | Resolved: 2020-08-04

## 2020-08-04 LAB
ALBUMIN SERPL BCP-MCNC: 3.2 G/DL (ref 3–5.2)
ALP SERPL-CCNC: 47 U/L (ref 43–122)
ALT SERPL W P-5'-P-CCNC: 9 U/L (ref 9–52)
ANION GAP SERPL CALCULATED.3IONS-SCNC: 8 MMOL/L (ref 5–14)
AST SERPL W P-5'-P-CCNC: 19 U/L (ref 14–36)
BACTERIA UR CULT: ABNORMAL
BASOPHILS # BLD AUTO: 0.1 THOUSANDS/ΜL (ref 0–0.1)
BASOPHILS NFR BLD AUTO: 1 % (ref 0–1)
BILIRUB SERPL-MCNC: 0.4 MG/DL
BUN SERPL-MCNC: 3 MG/DL (ref 5–25)
CALCIUM SERPL-MCNC: 8.7 MG/DL (ref 8.4–10.2)
CHLORIDE SERPL-SCNC: 101 MMOL/L (ref 97–108)
CO2 SERPL-SCNC: 25 MMOL/L (ref 22–30)
CREAT SERPL-MCNC: 0.51 MG/DL (ref 0.6–1.2)
EOSINOPHIL # BLD AUTO: 0.1 THOUSAND/ΜL (ref 0–0.4)
EOSINOPHIL NFR BLD AUTO: 1 % (ref 0–6)
ERYTHROCYTE [DISTWIDTH] IN BLOOD BY AUTOMATED COUNT: 13.6 %
GFR SERPL CREATININE-BSD FRML MDRD: 123 ML/MIN/1.73SQ M
GLUCOSE SERPL-MCNC: 88 MG/DL (ref 70–99)
HCT VFR BLD AUTO: 31.6 % (ref 36–46)
HGB BLD-MCNC: 10.6 G/DL (ref 12–16)
LYMPHOCYTES # BLD AUTO: 2.3 THOUSANDS/ΜL (ref 0.5–4)
LYMPHOCYTES NFR BLD AUTO: 25 % (ref 25–45)
MCH RBC QN AUTO: 30 PG (ref 26–34)
MCHC RBC AUTO-ENTMCNC: 33.4 G/DL (ref 31–36)
MCV RBC AUTO: 90 FL (ref 80–100)
MONOCYTES # BLD AUTO: 0.9 THOUSAND/ΜL (ref 0.2–0.9)
MONOCYTES NFR BLD AUTO: 9 % (ref 1–10)
NEUTROPHILS # BLD AUTO: 5.8 THOUSANDS/ΜL (ref 1.8–7.8)
NEUTS SEG NFR BLD AUTO: 63 % (ref 45–65)
PLATELET # BLD AUTO: 272 THOUSANDS/UL (ref 150–450)
PMV BLD AUTO: 9.5 FL (ref 8.9–12.7)
POTASSIUM SERPL-SCNC: 3.7 MMOL/L (ref 3.6–5)
PROT SERPL-MCNC: 6.7 G/DL (ref 5.9–8.4)
RBC # BLD AUTO: 3.53 MILLION/UL (ref 4–5.2)
RPR SER QL: NORMAL
SODIUM SERPL-SCNC: 134 MMOL/L (ref 137–147)
WBC # BLD AUTO: 9.2 THOUSAND/UL (ref 4.5–11)

## 2020-08-04 PROCEDURE — 80053 COMPREHEN METABOLIC PANEL: CPT | Performed by: STUDENT IN AN ORGANIZED HEALTH CARE EDUCATION/TRAINING PROGRAM

## 2020-08-04 PROCEDURE — 85025 COMPLETE CBC W/AUTO DIFF WBC: CPT | Performed by: STUDENT IN AN ORGANIZED HEALTH CARE EDUCATION/TRAINING PROGRAM

## 2020-08-04 PROCEDURE — 99239 HOSP IP/OBS DSCHRG MGMT >30: CPT | Performed by: FAMILY MEDICINE

## 2020-08-04 RX ORDER — NITROFURANTOIN 25; 75 MG/1; MG/1
100 CAPSULE ORAL 2 TIMES DAILY
Qty: 8 CAPSULE | Refills: 0 | Status: SHIPPED | OUTPATIENT
Start: 2020-08-04 | End: 2020-08-08

## 2020-08-04 RX ORDER — CEFPODOXIME PROXETIL 100 MG/1
100 TABLET, FILM COATED ORAL 2 TIMES DAILY
Qty: 8 TABLET | Refills: 0 | Status: SHIPPED | OUTPATIENT
Start: 2020-08-04 | End: 2020-08-08

## 2020-08-04 RX ORDER — NITROFURANTOIN 25; 75 MG/1; MG/1
100 CAPSULE ORAL
Qty: 90 CAPSULE | Refills: 2 | Status: SHIPPED | OUTPATIENT
Start: 2020-08-08 | End: 2020-09-18

## 2020-08-04 RX ADMIN — ENOXAPARIN SODIUM 40 MG: 100 INJECTION SUBCUTANEOUS at 08:04

## 2020-08-04 NOTE — PLAN OF CARE
Problem: PAIN - ADULT  Goal: Verbalizes/displays adequate comfort level or baseline comfort level  Description: Interventions:  - Encourage patient to monitor pain and request assistance  - Assess pain using appropriate pain scale  - Administer analgesics based on type and severity of pain and evaluate response  - Implement non-pharmacological measures as appropriate and evaluate response  - Consider cultural and social influences on pain and pain management  - Notify physician/advanced practitioner if interventions unsuccessful or patient reports new pain  Outcome: Progressing     Problem: INFECTION - ADULT  Goal: Absence or prevention of progression during hospitalization  Description: INTERVENTIONS:  - Assess and monitor for signs and symptoms of infection  - Monitor lab/diagnostic results  - Monitor all insertion sites, i e  indwelling lines, tubes, and drains  - Monitor endotracheal if appropriate and nasal secretions for changes in amount and color  - [place] appropriate cooling/warming therapies per order  - Administer medications as ordered  - Instruct and encourage patient and family to use good hand hygiene technique  - Identify and instruct in appropriate isolation precautions for identified infection/condition  Outcome: Progressing     Problem: DISCHARGE PLANNING  Goal: Discharge to home or other facility with appropriate resources  Description: INTERVENTIONS:  - Identify barriers to discharge w/patient and caregiver  - Arrange for needed discharge resources and transportation as appropriate  - Identify discharge learning needs (meds, wound care, etc )  - Arrange for interpretive services to assist at discharge as needed  - Refer to Case Management Department for coordinating discharge planning if the patient needs post-hospital services based on physician/advanced practitioner order or complex needs related to functional status, cognitive ability, or social support system  Outcome: Progressing Problem: Knowledge Deficit  Goal: Patient/family/caregiver demonstrates understanding of disease process, treatment plan, medications, and discharge instructions  Description: Complete learning assessment and assess knowledge base    Interventions:  - Provide teaching at level of understanding  - Provide teaching via preferred learning methods  Outcome: Progressing     Problem: GASTROINTESTINAL - ADULT  Goal: Minimal or absence of nausea and/or vomiting  Description: INTERVENTIONS:  - Administer IV fluids if ordered to ensure adequate hydration  - Maintain NPO status until nausea and vomiting are resolved  - Nasogastric tube if ordered  - Administer ordered antiemetic medications as needed  - Provide nonpharmacologic comfort measures as appropriate  - Advance diet as tolerated, if ordered  - Consider nutrition services referral to assist patient with adequate nutrition and appropriate food choices  Outcome: Progressing  Goal: Maintains adequate nutritional intake  Description: INTERVENTIONS:  - Monitor percentage of each meal consumed  - Identify factors contributing to decreased intake, treat as appropriate  - Assist with meals as needed  - Monitor I&O, weight, and lab values if indicated  - Obtain nutrition services referral as needed  Outcome: Progressing     Problem: GENITOURINARY - ADULT  Goal: Maintains or returns to baseline urinary function  Description: INTERVENTIONS:  - Assess urinary function  - Encourage oral fluids to ensure adequate hydration if ordered  - Administer IV fluids as ordered to ensure adequate hydration  - Administer ordered medications as needed  - Offer frequent toileting  - Follow urinary retention protocol if ordered  Outcome: Progressing     Problem: METABOLIC, FLUID AND ELECTROLYTES - ADULT  Goal: Electrolytes maintained within normal limits  Description: INTERVENTIONS:  - Monitor labs and assess patient for signs and symptoms of electrolyte imbalances  - Administer electrolyte replacement as ordered  - Monitor response to electrolyte replacements, including repeat lab results as appropriate  - Instruct patient on fluid and nutrition as appropriate  Outcome: Progressing  Goal: Fluid balance maintained  Description: INTERVENTIONS:  - Monitor labs   - Monitor I/O and WT  - Instruct patient on fluid and nutrition as appropriate  - Assess for signs & symptoms of volume excess or deficit  Outcome: Progressing
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
## 2020-08-04 NOTE — PLAN OF CARE

## 2020-08-04 NOTE — ASSESSMENT & PLAN NOTE
- No fever, CVA tenderness, n/v/ suprapubic tenderness/ distension  - Afebrile, hemodynamically stable  - WBC trended down to 10 4   - Urine Cx: 40,000-49,000 cfu/ml Gram Negative Drew Enteric Like    - Will require Prophylactic 100 mg nitrofurantoin night throughout remainder of pregnancy, as per ACOG guidelines  - Monitor fever curve; Tylenol 650 mg Q6 PRN for fever and pain control

## 2020-08-04 NOTE — DISCHARGE INSTRUCTIONS
Embarazo de la semana 7 a la 10   LO QUE NECESITA SABER:   La hormonas del embarazo podrían provocar que barriga cuerpo pase por varios cambios scar esta etapa del embarazo  Es posible que usted se sienta más cansado de lo normal y que tenga cambios de humor, náuseas y vómitos, y corey de [place]  Parisa senos podrían sentirse sensibles e inflamados y usted podría orinar con más frecuencia  INSTRUCCIONES SOBRE EL [facility]ITAL[facility]:   Busque atención médica de inmediato si:   · Usted tiene dolor o cólicos en el abdomen o la parte baja de la espalda  · Usted tiene sangrado vaginal abundante o coágulos  · Le sale un material que parece tejido o coágulos grandes  Recolecte el material y tráigalo con usted  Pregúntele a barriga Madina Show vitaminas y minerales son adecuados para usted  · Usted tiene un sangrado leve  · Usted tiene escalofríos o fiebre  · Usted tiene comezón, ardor o dolor vaginal      · Usted tiene mehnaz secreción vaginal amarillenta, verdosa, antony o de [employer]  · Usted tiene dolor o ardor al Senthil Nasuti, orina menos de lo habitual o tiene [place] rosada o sanguinolenta  · Usted tiene preguntas o inquietudes acerca de barriga condición o cuidado  Cómo cuidarse en esta etapa de barriga embarazo:   · Controle la náusea y el vómito  Evite los alimentos grasosos y picantes  Coma comidas pequeñas scar el día en vez de porciones grandes  El jengibre puede ayudar a [employer]  Consulte con barriga médico acerca de otras formas para disminuir las náuseas y el vómito  · Consuma alimentos saludables y variados  Alimentos saludables incluyen frutas, verduras, panes de amber integral, alimentos lácteos bajos en grasa, frijoles, janeth magras y pescado  [place] líquidos pearl se le haya indicado  Pregunte cuánto líquido debe skye cada día y cuáles líquidos son los más adecuados para usted  Limite el consumo de cafeína a menos de [address]   Limite el consumo de pescado a 2 porciones cada semana  Escoja pescado con concentraciones bajas de jose roberto pearl atún al natural enlatado, camarón, salmón, bacalao o tilapia  No  coma pescado con concentraciones altas de jose roberto pearl pez mariza, caballa gigante, pargo rayado y tiburón  · [id number] [place]  Barriga necesidad de ciertas vitaminas y [address], pearl el ácido fólico, aumenta scar el [facility]  Las vitaminas prenatales proporcionan algunas de las vitaminas y minerales adicionales que usted necesita  Las vitaminas prenatales también podrían ayudar a disminuir el riesgo de ciertos defectos de nacimiento  · Pregunte cuánto peso usted debería aumentar cada mes  Demasiado aumento de peso o muy poco puede ser poco saludable para usted y barriga bebé  · No fume  Si usted fuma, nunca es demasiado tarde para dejar de hacerlo  Fumar aumenta el riesgo de aborto espontáneo y otros problemas de matt scar barriga [facility]  Fumar puede causar que barriga bebé nazca antes de tiempo o que pese menos al nacer  Solicite información a barriga médico si usted necesita ayuda para dejar de fumar  · No consuma alcohol  El alcohol pasa de barriga cuerpo al bebé a través de la placenta  Puede afectar el desarrollo del cerebro de barriga bebé y provocar el síndrome de alcoholismo fetal (SAF)  FAS es un piyush de condiciones que causan [address], de comportamiento y de crecimiento  · Consulte con barriga médico antes de skye cualquier medicamento  Muchos medicamentos pueden perjudicar a barriga bebé si usted los harris [facility] [address]  No tome ningún medicamento, vitaminas, hierbas o suplementos sin gloria consultar con barriga Aimee Maddy  use drogas ilegales o de la haq (pearl marihuana o cocaína) mientras está embarazada  Consejos de seguridad scar el embarazo:   · Evite jacuzzis y saunas  No use un jacuzzi o un sauna mientras usted está embarazada, especialmente scar el primer trimestre   Los [facility] y los saunas [facility] temperatura de barriga bebé y el riesgo de defectos de nacimiento  · Evite la toxoplasmosis  [place] es mehnaz infección causada por comer carne cruda o estar cerca del excremento de un luis infectado  [place] puede causar malformaciones congénitas, aborto espontáneo y Donny Schein  Lávese las mary después de tocar carne cruda  Asegúrese de que la carne esté jonnie cocida antes de comerla  Evite los huevos crudos y la Hiram Brannon  Use guantes o pida que alguien la ayude a limpiar la caja de arena del luis mientras usted Georgiana Osmane  Cambios que están ocurriendo con barriga bebé:  Para las 10 semanas, barriga bebé medirá alrededor de 2 ½ pulgadas desde la nicole hasta la rabadilla (parte inferior o cóccix del bebé)  Barriga bebé pesa alrededor de ½ onza  Los [employer] del cuerpo, pearl el cerebro, corazón y pulmones se están formando  Las características faciales de barriga bebé también están comenzando a formarse  Lo que necesita saber acerca del cuidado prenatal:  El cuidado prenatal se trata de mehnaz serie de visitas con barriga médico a lo ana del embarazo  Pan las primeras 28 semanas de barriga us [place] tendrá citas mensuales con barriga médico  El cuidado prenatal puede ayudar a evitar problemas pan el [place] y [place]  Barriga médico le hará preguntas acerca de barriga matt y de cualquier embarazo previo que usted haya tenido  Él también le preguntará acerca de cualquier medicamento que esté tomando  Es posible que también necesite alguno de los siguientes tratamientos:  · Mehnaz prueba de [place]  se realiza para revisar si barriga mili uterino tiene células anormales  El mili uterino es la apertura angosta que está en la parte inferior de [place]  El mili uterino se junta con la parte superior de la vagina  · Un examen pélvico  le permite a barriga médico observar barriga mili uterino (la parte inferior de barriga [place])  Barriga médico utiliza un espéculo para abrir barriga vagina suavemente   Él examinará el tamaño y forma de barriga Zhanna Oiler      · Los análisis de lelia:  podrían realizarse para revisar signos de anemia o el tipo de [place]  Nj médico también podría ordenarle otros exámenes de lelia para revisar si usted es inmune a ciertas enfermedades pearl la Hepatitis B  También podría recomendarle un examen del VIH  · Análisis de orina  también podrían realizarse para revisar si hay signos de infección  · Nj presión arterial y peso  será revisado  © 2017 [address] Information is for End User's use only and may not be sold, redistributed or otherwise used for commercial purposes  All illustrations and images included in CareNotes® are the copyrighted property of A D A M , Inc  or Mando Hein  Esta información es sólo para uso en educación  Nj intención no es darle un consejo médico sobre enfermedades o tratamientos  Colsulte con nj Dima Lauth farmacéutico antes de seguir cualquier régimen médico para saber si es seguro y efectivo para usted  Infección del riñón   LO QUE NECESITA SABER:   Mehnaz infección de Isabel Nicole, o pielonefritis es mehnaz infección bacteriana  La infección usualmente comienza en nj vejiga o uretra y se pasa a nj Isabel Nicole  Charles o ambos riñones podrían estar infectados  INSTRUCCIONES SOBRE EL JIMMY HOSPITALARIA:   Regrese a la jenn de emergencias si:   · Usted tiene fiebre o escalofríos  · Usted no puede dejar de vomitar  · Usted tiene dolor intenso en el abdomen, en la parte inferior de la espalda o en los costados  Pregúntele a nj Johnna Starr vitaminas y minerales son adecuados para usted  · Usted continúa teniendo fiebre después de skye los antibióticos por 3 días  · Usted siente dolor al orinar, incluso después del tratamiento  · [place] signos y síntomas regresan  · Usted tiene preguntas o inquietudes acerca de nj condición o cuidado  Medicamentos:  Es posible que usted necesite alguno de los siguientes:  · Antibióticos  tratan nj infección bacteriana  · Acetaminofeno:  eren el dolor y baja la fiebre  Está disponible sin receta médica  Pregunte la cantidad y la frecuencia con que debe tomarlos  [address]  Janelle las etiquetas de todos los demás medicamentos que esté usando para saber si también contienen acetaminofén, o pregunte a barriga médico o farmacéutico  El acetaminofén puede causar daño en el hígado cuando no se harris de forma correcta  No use más de 4 gramos (4000 miligramos) en total de acetaminofeno en un día  · AINEs (Analgésicos antiinflamatorios no esteroides) pearl el ibuprofeno, ayudan a disminuir la inflamación, el dolor y la [place]  Kasie medicamento esta disponible con o sin mehnaz receta médica  Los AINEs pueden causar sangrado estomacal o problemas renales en ciertas personas  Si usted esta tomando un anticoágulante,  siempre  pregunte si los AINEs son seguros para usted  Siempre janelle la etiqueta de kasie medicamento y [place] instrucciones  No administre kasie medicamento a niños menores de 6 meses de shirley sin antes obtener la autorización de barriga médico      · Un medicamento con receta para el dolor  podrían ser Chidi Morrison  Pregunte cómo skye estos medicamentos de mehnaz forma carrera  · [place] baljinder medicamentos pearl se le haya indicado  Consulte con barriga médico si usted elina que barriga medicamento no le está ayudando o si presenta efectos secundarios  Infórmele si es alérgico a algún medicamento  Mantenga mehnaz lista actualizada de los [place], las vitaminas y los productos herbales que harris  Incluya los siguientes datos de los medicamentos: cantidad, frecuencia y motivo de administración  Traiga con usted la lista o los envases de la píldoras a baljinder citas de seguimiento  Lleve la lista de los medicamentos con usted en dragan de mehnaz emergencia  [place] líquidos según baljinder indicaciones:  Es posible que usted necesite skye líquidos adicionales para ayudar a limpiar baljinder riñones y barriga sistema urinario  El agua es el mejor líquido para skye  Pregunte a nj médico sobre la cantidad de líquido que necesita skye todos los días y cuáles le recomienda  Orine costello pronto pearl sienta la necesidad de hacerlo:  [place] ayudará a eliminar las bacterias de nj sistema urinario  No espere ni aguante nj orina por [employer]  Limpie el área de los genitales a diario con agua y [place]  Límpiese de adelante hacia atrás después de orinar o de tener mehnaz evacuación intestinal  Use ropa interior de algodón  Las telas pearl el nylon y el poliéster pueden [employer]  [place] puede aumentar nj riesgo de mehnaz infección  Orine dentro de 15 minutos después de yahaira tenido [employer]  Acuda a baljinder consultas de control con nj médico según le indicaron  Anote baljinder preguntas para que se acuerde de hacerlas scar baljinder visitas  © 2017 [address] [facility] Information is for End User's use only and may not be sold, redistributed or otherwise used for commercial purposes  All illustrations and images included in CareNotes® are the copyrighted property of A D A M , Inc  or Mando Hein  Esta información es sólo para uso en educación  Jn intención no es darle un consejo médico sobre enfermedades o tratamientos  Colsulte con nj Verlon Gemma farmacéutico antes de seguir cualquier régimen médico para saber si es seguro y efectivo para usted

## 2020-08-04 NOTE — NURSING NOTE
On initial rounds patient in no apparent distress  Skin warm and dry to touch  Denies pain but verbalizes understanding of pain scale 0-1-0  Ambulates well by self  Tolerating diet  All safety maintained  Will continue to monitor

## 2020-08-04 NOTE — SOCIAL WORK
LOS: 2d  Bundle: No  Readmission: No   Unplanned Readmission score: Green  CM met with patient at bedside  Explained role of CM to pt  CM obtained the following information from the patient  HOME: House  LIVES WITH: S O , and 3 crn  ADLs:  IPTA  MEALS: IPTA  DME: None  HHC/VNA: None  STR: None  MH concerns: Denies  D&A concerns:Denies   INCOME SOURCE: employed  PCP: Ellen Michael MD  PHARMACY: Will p/u at [address] [place] [address] pharmacy at d/c  POA/LW: None     DRIVES: Yes  TRANSPORT at IA: S O  will p/u    Pt is happy for pregnancy, S O is supportive  Pt reports that she has the contact information for OB, does not remember the name however friend had recommended  Pt will p/u Rx at [facility] at d/c, S O will transport

## 2020-08-05 LAB — HIV 1+2 AB+HIV1 P24 AG SERPL QL IA: NORMAL

## 2020-08-07 LAB
BACTERIA BLD CULT: NORMAL
BACTERIA BLD CULT: NORMAL

## 2020-08-10 ENCOUNTER — APPOINTMENT (EMERGENCY)
Dept: CT IMAGING | Facility: HOSPITAL | Age: 35
End: 2020-08-10
Payer: COMMERCIAL

## 2020-08-10 ENCOUNTER — HOSPITAL ENCOUNTER (EMERGENCY)
Facility: HOSPITAL | Age: 35
Discharge: HOME | End: 2020-08-10
Attending: EMERGENCY MEDICINE | Admitting: EMERGENCY MEDICINE
Payer: COMMERCIAL

## 2020-08-10 VITALS
TEMPERATURE: 97 F | HEART RATE: 71 BPM | DIASTOLIC BLOOD PRESSURE: 78 MMHG | WEIGHT: 149.91 LBS | RESPIRATION RATE: 16 BRPM | OXYGEN SATURATION: 99 % | SYSTOLIC BLOOD PRESSURE: 114 MMHG | BODY MASS INDEX: 30.28 KG/M2

## 2020-08-10 DIAGNOSIS — R55 NEAR SYNCOPE: Primary | ICD-10-CM

## 2020-08-10 DIAGNOSIS — R42 DIZZINESS: ICD-10-CM

## 2020-08-10 LAB
ALBUMIN SERPL BCP-MCNC: 3.2 G/DL (ref 3–5.2)
ALP SERPL-CCNC: 44 U/L (ref 43–122)
ALT SERPL W P-5'-P-CCNC: 18 U/L (ref 9–52)
ANION GAP SERPL CALCULATED.3IONS-SCNC: 7 MMOL/L (ref 5–14)
AST SERPL W P-5'-P-CCNC: 17 U/L (ref 14–36)
ATRIAL RATE: 68 BPM
BASOPHILS # BLD AUTO: 0.1 THOUSANDS/ΜL (ref 0–0.1)
BASOPHILS NFR BLD AUTO: 2 % (ref 0–1)
BILIRUB SERPL-MCNC: 0.2 MG/DL
BILIRUB UR QL STRIP: NEGATIVE
BUN SERPL-MCNC: 6 MG/DL (ref 5–25)
CALCIUM SERPL-MCNC: 8 MG/DL (ref 8.4–10.2)
CHLORIDE SERPL-SCNC: 106 MMOL/L (ref 97–108)
CLARITY UR: CLEAR
CO2 SERPL-SCNC: 22 MMOL/L (ref 22–30)
COLOR UR: NORMAL
CREAT SERPL-MCNC: 0.44 MG/DL (ref 0.6–1.2)
EOSINOPHIL # BLD AUTO: 0.1 THOUSAND/ΜL (ref 0–0.4)
EOSINOPHIL NFR BLD AUTO: 1 % (ref 0–6)
ERYTHROCYTE [DISTWIDTH] IN BLOOD BY AUTOMATED COUNT: 13.5 %
EXT PREG TEST URINE: POSITIVE
EXT. CONTROL ED NAV: ABNORMAL
GFR SERPL CREATININE-BSD FRML MDRD: 129 ML/MIN/1.73SQ M
GLUCOSE SERPL-MCNC: 69 MG/DL (ref 70–99)
GLUCOSE UR STRIP-MCNC: NEGATIVE MG/DL
HCT VFR BLD AUTO: 36.8 % (ref 36–46)
HGB BLD-MCNC: 12.2 G/DL (ref 12–16)
HGB UR QL STRIP.AUTO: NEGATIVE
KETONES UR STRIP-MCNC: NEGATIVE MG/DL
LEUKOCYTE ESTERASE UR QL STRIP: NEGATIVE
LYMPHOCYTES # BLD AUTO: 2.6 THOUSANDS/ΜL (ref 0.5–4)
LYMPHOCYTES NFR BLD AUTO: 33 % (ref 25–45)
MCH RBC QN AUTO: 29.3 PG (ref 26–34)
MCHC RBC AUTO-ENTMCNC: 33.1 G/DL (ref 31–36)
MCV RBC AUTO: 88 FL (ref 80–100)
MONOCYTES # BLD AUTO: 0.5 THOUSAND/ΜL (ref 0.2–0.9)
MONOCYTES NFR BLD AUTO: 6 % (ref 1–10)
NEUTROPHILS # BLD AUTO: 4.6 THOUSANDS/ΜL (ref 1.8–7.8)
NEUTS SEG NFR BLD AUTO: 59 % (ref 45–65)
NITRITE UR QL STRIP: NEGATIVE
P AXIS: 30 DEGREES
PH UR STRIP.AUTO: 7 [PH]
PLATELET # BLD AUTO: 406 THOUSANDS/UL (ref 150–450)
PMV BLD AUTO: 8.4 FL (ref 8.9–12.7)
POTASSIUM SERPL-SCNC: 3.6 MMOL/L (ref 3.6–5)
PR INTERVAL: 168 MS
PROT SERPL-MCNC: 6.5 G/DL (ref 5.9–8.4)
PROT UR STRIP-MCNC: NEGATIVE MG/DL
QRS AXIS: 6 DEGREES
QRSD INTERVAL: 86 MS
QT INTERVAL: 412 MS
QTC INTERVAL: 438 MS
RBC # BLD AUTO: 4.16 MILLION/UL (ref 4–5.2)
SODIUM SERPL-SCNC: 135 MMOL/L (ref 137–147)
SP GR UR STRIP.AUTO: 1 (ref 1–1.04)
T WAVE AXIS: 10 DEGREES
UROBILINOGEN UA: NEGATIVE MG/DL
VENTRICULAR RATE: 68 BPM
WBC # BLD AUTO: 7.9 THOUSAND/UL (ref 4.5–11)

## 2020-08-10 PROCEDURE — 99284 EMERGENCY DEPT VISIT MOD MDM: CPT | Performed by: EMERGENCY MEDICINE

## 2020-08-10 PROCEDURE — 93010 ELECTROCARDIOGRAM REPORT: CPT

## 2020-08-10 PROCEDURE — 36415 COLL VENOUS BLD VENIPUNCTURE: CPT | Performed by: EMERGENCY MEDICINE

## 2020-08-10 PROCEDURE — 99284 EMERGENCY DEPT VISIT MOD MDM: CPT

## 2020-08-10 PROCEDURE — 85025 COMPLETE CBC W/AUTO DIFF WBC: CPT | Performed by: EMERGENCY MEDICINE

## 2020-08-10 PROCEDURE — 96360 HYDRATION IV INFUSION INIT: CPT

## 2020-08-10 PROCEDURE — G1004 CDSM NDSC: HCPCS

## 2020-08-10 PROCEDURE — 96361 HYDRATE IV INFUSION ADD-ON: CPT

## 2020-08-10 PROCEDURE — 93005 ELECTROCARDIOGRAM TRACING: CPT

## 2020-08-10 PROCEDURE — 80053 COMPREHEN METABOLIC PANEL: CPT | Performed by: EMERGENCY MEDICINE

## 2020-08-10 PROCEDURE — 70450 CT HEAD/BRAIN W/O DYE: CPT

## 2020-08-10 PROCEDURE — 81025 URINE PREGNANCY TEST: CPT | Performed by: EMERGENCY MEDICINE

## 2020-08-10 RX ADMIN — SODIUM CHLORIDE 1000 ML: 0.9 INJECTION, SOLUTION INTRAVENOUS at 13:00

## 2020-08-10 NOTE — ED PROVIDER NOTES
History  Chief Complaint   Patient presents with    Syncope     at work felt dizzy and had syncopal episode (+) LOC and fell to floor striking back of head  pt arrives A&O, only c/o of pain to back of head   states also 6 weeks pregnant  no abd/ob complaints     57-year-old female presents after having a near syncopal episode  She states that she was at work and began to feel lightheaded/dizzy  She lost her balance and fell backwards striking her occiput  There was no loss of consciousness and she states that at this point she feels normal again  The patient is 6-7 weeks pregnant but denies any vaginal bleeding/discharge or pregnancy related issues  She denies any recent illnesses - specifically she denies any fever/chills, chest pain, shortness of breath, GI symptoms, or URI symptoms  Syncope   Episode history:  Single  Most recent episode: Today  Timing:  Rare  Progression:  Resolved  Chronicity:  New  Context: standing up    Witnessed: yes    Relieved by:  Lying down  Worsened by:  Nothing  Ineffective treatments:  None tried  Associated symptoms: dizziness and headaches (Secondary to head trauma)    Associated symptoms: no anxiety, no chest pain, no confusion, no diaphoresis, no difficulty breathing, no fever, no focal sensory loss, no focal weakness, no malaise/fatigue, no nausea, no palpitations, no recent surgery, no rectal bleeding, no seizures, no shortness of breath, no visual change, no vomiting and no weakness        Prior to Admission Medications   Prescriptions Last Dose Informant Patient Reported? Taking?   nitrofurantoin (MACROBID) 100 mg capsule   No No   Sig: Take 1 capsule (100 mg total) by mouth daily at bedtime      Facility-Administered Medications: None       History reviewed  No pertinent past medical history  Past Surgical History:   Procedure Laterality Date     SECTION      GASTRIC BYPASS      sleeve       History reviewed  No pertinent family history    I have reviewed and agree with the history as documented  E-Cigarette/Vaping     E-Cigarette/Vaping Substances     Social History     Tobacco Use    Smoking status: Never Smoker    Smokeless tobacco: Never Used   Substance Use Topics    Alcohol use: Not Currently    Drug use: Never       Review of Systems   Constitutional: Negative for appetite change, chills, diaphoresis, fatigue, fever and malaise/fatigue  HENT: Negative for congestion, postnasal drip, sinus pain and trouble swallowing  Eyes: Negative for redness and itching  Respiratory: Negative for chest tightness, shortness of breath and wheezing  Cardiovascular: Positive for syncope  Negative for chest pain, palpitations and leg swelling  Gastrointestinal: Negative for abdominal pain, constipation, diarrhea, nausea and vomiting  Endocrine: Negative  Genitourinary: Negative for difficulty urinating, dysuria, vaginal bleeding and vaginal discharge  Musculoskeletal: Negative for back pain and myalgias  Skin: Negative for rash  Allergic/Immunologic: Negative  Neurological: Positive for dizziness, light-headedness and headaches (Secondary to head trauma)  Negative for tremors, focal weakness, seizures, syncope, facial asymmetry, speech difficulty, weakness and numbness  Hematological: Negative  Psychiatric/Behavioral: Negative  Negative for confusion  Physical Exam  Physical Exam  Vitals signs and nursing note reviewed  Constitutional:       General: She is not in acute distress  Appearance: Normal appearance  She is well-developed  She is not ill-appearing, toxic-appearing or diaphoretic  HENT:      Head: Normocephalic  No raccoon eyes, Jamison's sign or laceration  Jaw: There is normal jaw occlusion  Nose: Nose normal    Eyes:      Conjunctiva/sclera: Conjunctivae normal       Pupils: Pupils are equal, round, and reactive to light  Neck:      Musculoskeletal: Normal range of motion and neck supple  Cardiovascular:      Rate and Rhythm: Normal rate and regular rhythm  Heart sounds: Normal heart sounds  Pulmonary:      Effort: Pulmonary effort is normal  No respiratory distress  Breath sounds: Normal breath sounds  No wheezing  Abdominal:      General: Bowel sounds are normal       Palpations: Abdomen is soft  Tenderness: There is no abdominal tenderness (Mild suprapubic - chronic and unchanged)  There is no guarding  Musculoskeletal:         General: No tenderness, deformity or signs of injury  Right lower leg: No edema  Left lower leg: No edema  Skin:     General: Skin is warm and dry  Capillary Refill: Capillary refill takes less than 2 seconds  Findings: No rash  Neurological:      Mental Status: She is alert and oriented to person, place, and time     Psychiatric:         Behavior: Behavior normal          Vital Signs  ED Triage Vitals [08/10/20 1159]   Temperature Pulse Respirations Blood Pressure SpO2   (!) 97 °F (36 1 °C) 70 18 124/79 100 %      Temp Source Heart Rate Source Patient Position - Orthostatic VS BP Location FiO2 (%)   Tympanic Monitor Lying Left arm --      Pain Score       --           Vitals:    08/10/20 1159 08/10/20 1500   BP: 124/79 114/78   Pulse: 70 71   Patient Position - Orthostatic VS: Lying Lying         Visual Acuity      ED Medications  Medications   sodium chloride 0 9 % bolus 1,000 mL (0 mL Intravenous Stopped 8/10/20 1501)       Diagnostic Studies  Results Reviewed     Procedure Component Value Units Date/Time    Comprehensive metabolic panel [[id number]]  (Abnormal) Collected:  08/10/20 1337    Lab Status:  Final result Specimen:  Blood from Hand, Left Updated:  08/10/20 1405     Sodium 135 mmol/L      Potassium 3 6 mmol/L      Chloride 106 mmol/L      CO2 22 mmol/L      ANION GAP 7 mmol/L      BUN 6 mg/dL      Creatinine 0 44 mg/dL      Glucose 69 mg/dL      Calcium 8 0 mg/dL      AST 17 U/L      ALT 18 U/L      Alkaline Phosphatase 44 U/L      Total Protein 6 5 g/dL      Albumin 3 2 g/dL      Total Bilirubin 0 20 mg/dL      eGFR 129 ml/min/1 73sq m     Narrative:       National Kidney Disease Foundation guidelines for Chronic Kidney Disease (CKD):     Stage 1 with normal or high GFR (GFR > 90 mL/min/1 73 square meters)    Stage 2 Mild CKD (GFR = 60-89 mL/min/1 73 square meters)    Stage 3A Moderate CKD (GFR = 45-59 mL/min/1 73 square meters)    Stage 3B Moderate CKD (GFR = 30-44 mL/min/1 73 square meters)    Stage 4 Severe CKD (GFR = 15-29 mL/min/1 73 square meters)    Stage 5 End Stage CKD (GFR <15 mL/min/1 73 square meters)  Note: GFR calculation is accurate only with a steady state creatinine    CBC and differential [[id number]]  (Abnormal) Collected:  08/10/20 1257    Lab Status:  Final result Specimen:  Blood from Hand, Left Updated:  08/10/20 1316     WBC 7 90 Thousand/uL      RBC 4 16 Million/uL      Hemoglobin 12 2 g/dL      Hematocrit 36 8 %      MCV 88 fL      MCH 29 3 pg      MCHC 33 1 g/dL      RDW 13 5 %      MPV 8 4 fL      Platelets 741 Thousands/uL      Neutrophils Relative 59 %      Lymphocytes Relative 33 %      Monocytes Relative 6 %      Eosinophils Relative 1 %      Basophils Relative 2 %      Neutrophils Absolute 4 60 Thousands/µL      Lymphocytes Absolute 2 60 Thousands/µL      Monocytes Absolute 0 50 Thousand/µL      Eosinophils Absolute 0 10 Thousand/µL      Basophils Absolute 0 10 Thousands/µL     UA (URINE) with reflex to Scope [[id number]]  (Normal) Collected:  08/10/20 1240    Lab Status:  Final result Specimen:  Urine, Other Updated:  08/10/20 1306     Color, UA Straw     Clarity, UA Clear     Specific [place], UA 1 005     pH, UA 7 0     Leukocytes, UA Negative     Nitrite, UA Negative     Protein, UA Negative mg/dl      Glucose, UA Negative mg/dl      Ketones, UA Negative mg/dl      Bilirubin, UA Negative     Blood, UA Negative     UROBILINOGEN UA Negative mg/dL     POCT pregnancy, urine [[id number]]  (Abnormal) Resulted:  08/10/20 1244    Lab Status:  Final result Updated:  08/10/20 1244     EXT PREG TEST UR (Ref: Negative) positive     Control valid                 CT head without contrast   Final Result by Selena Gómez MD (08/10 1421)      No acute intracranial abnormality  Workstation performed: [id number]                    Procedures  ECG 12 Lead Documentation Only    Date/Time: 8/10/2020 12:17 PM  Performed by: Trent Bailey DO  Authorized by: Trent Bailey DO     ECG reviewed by me, the ED Provider: yes    Patient location:  ED  Rate:     ECG rate:  68    ECG rate assessment: normal    Rhythm:     Rhythm: sinus rhythm    Ectopy:     Ectopy: none    QRS:     QRS axis:  Normal  Conduction:     Conduction: normal    ST segments:     ST segments:  Normal  T waves:     T waves: non-specific               ED Course                                             MDM      Disposition  Final diagnoses:   Near syncope   Dizziness     Time reflects when diagnosis was documented in both MDM as applicable and the Disposition within this note     Time User Action Codes Description Comment    8/10/2020  2:43 PM Sean Needles Add [R55] Near syncope     8/10/2020  2:43 PM Sean Needles Add [R42] Dizziness       ED Disposition     ED Disposition Condition Date/Time Comment    Discharge Stable Mon Aug 10, 2020  2:43 PM Wilver Manzo discharge to home/self care  Follow-up Information    None         Discharge Medication List as of 8/10/2020  3:05 PM      CONTINUE these medications which have NOT CHANGED    Details   nitrofurantoin (MACROBID) 100 mg capsule Take 1 capsule (100 mg total) by mouth daily at bedtime, Starting Sat 8/8/2020, Until Wed 5/5/2021, Normal           No discharge procedures on file      PDMP Review     None          ED Provider  Electronically Signed by           Trent Bailey DO  08/10/20 6005

## 2020-08-31 ENCOUNTER — TELEPHONE (OUTPATIENT)
Dept: OBGYN CLINIC | Facility: CLINIC | Age: 35
End: 2020-08-31

## 2020-09-01 ENCOUNTER — ANNUAL EXAM (OUTPATIENT)
Dept: OBGYN CLINIC | Facility: CLINIC | Age: 35
End: 2020-09-01

## 2020-09-01 VITALS
HEIGHT: 59 IN | HEART RATE: 79 BPM | DIASTOLIC BLOOD PRESSURE: 71 MMHG | TEMPERATURE: 97.6 F | BODY MASS INDEX: 30.32 KG/M2 | SYSTOLIC BLOOD PRESSURE: 104 MMHG | WEIGHT: 150.4 LBS

## 2020-09-01 DIAGNOSIS — Z3A.10 10 WEEKS GESTATION OF PREGNANCY: Primary | ICD-10-CM

## 2020-09-01 PROCEDURE — 99202 OFFICE O/P NEW SF 15 MIN: CPT | Performed by: OBSTETRICS & GYNECOLOGY

## 2020-09-01 PROCEDURE — 3008F BODY MASS INDEX DOCD: CPT | Performed by: OBSTETRICS & GYNECOLOGY

## 2020-09-01 RX ORDER — PNV NO.95/FERROUS FUM/FOLIC AC 28MG-0.8MG
1 TABLET ORAL DAILY
Qty: 90 TABLET | Refills: 3 | Status: SHIPPED | OUTPATIENT
Start: 2020-09-01 | End: 2021-03-29 | Stop reason: ALTCHOICE

## 2020-09-01 NOTE — PROGRESS NOTES
Assessment/Plan:     No problem-specific Assessment & Plan notes found for this encounter  Diagnoses and all orders for this visit:    10 weeks gestation of pregnancy  -     Prenatal Vit-Fe Fumarate-FA (Prenatal Vitamin) 27-0 8 MG TABS; Take 1 tablet by mouth daily    RTO for OB intake  Subjective:      Patient ID: Gilma Magallanes is a 38 y o  female presents for dating and viability scan  Her LMP was 06/17/20, making her 10+6wk  TV US reveals a single viable IUP 10+6 wks consistent with LMP  FHT 170s  Pt denies any bleeding  HPI    The following portions of the patient's history were reviewed and updated as appropriate: allergies, current medications, past family history, past medical history, past social history, past surgical history and problem list     Review of Systems      Objective:      /71   Pulse 79   Temp 97 6 °F (36 4 °C)   Ht 4' 11" (1 499 m)   Wt 68 2 kg (150 lb 6 4 oz)   LMP 06/17/2020 (Exact Date)   BMI 30 38 kg/m²          Physical Exam  Vitals signs and nursing note reviewed  Constitutional:       Appearance: Normal appearance  Cardiovascular:      Pulses: Normal pulses  Genitourinary:     Labia:         Right: No rash, tenderness, lesion or injury  Left: No rash, tenderness, lesion or injury  Neurological:      General: No focal deficit present  Mental Status: She is alert and oriented to person, place, and time     Psychiatric:         Mood and Affect: Mood normal          Behavior: Behavior normal

## 2020-09-14 ENCOUNTER — APPOINTMENT (EMERGENCY)
Dept: RADIOLOGY | Facility: HOSPITAL | Age: 35
End: 2020-09-14
Payer: COMMERCIAL

## 2020-09-14 ENCOUNTER — TELEMEDICINE (OUTPATIENT)
Dept: OBGYN CLINIC | Facility: CLINIC | Age: 35
End: 2020-09-14

## 2020-09-14 ENCOUNTER — HOSPITAL ENCOUNTER (EMERGENCY)
Facility: HOSPITAL | Age: 35
Discharge: HOME | End: 2020-09-14
Attending: EMERGENCY MEDICINE | Admitting: EMERGENCY MEDICINE
Payer: COMMERCIAL

## 2020-09-14 VITALS
WEIGHT: 152.12 LBS | SYSTOLIC BLOOD PRESSURE: 126 MMHG | DIASTOLIC BLOOD PRESSURE: 65 MMHG | OXYGEN SATURATION: 100 % | BODY MASS INDEX: 30.72 KG/M2 | RESPIRATION RATE: 18 BRPM | TEMPERATURE: 98.5 F | HEART RATE: 77 BPM

## 2020-09-14 DIAGNOSIS — Z3A.13 13 WEEKS GESTATION OF PREGNANCY: ICD-10-CM

## 2020-09-14 DIAGNOSIS — Z34.91 PRENATAL CARE IN FIRST TRIMESTER: Primary | ICD-10-CM

## 2020-09-14 DIAGNOSIS — S93.409A ANKLE SPRAIN: Primary | ICD-10-CM

## 2020-09-14 DIAGNOSIS — Z34.90 PREGNANCY: ICD-10-CM

## 2020-09-14 DIAGNOSIS — Z98.84 H/O GASTRIC BYPASS: ICD-10-CM

## 2020-09-14 PROBLEM — O99.210 OBESITY AFFECTING PREGNANCY: Status: ACTIVE | Noted: 2019-09-09

## 2020-09-14 PROBLEM — Z3A.12 12 WEEKS GESTATION OF PREGNANCY: Status: ACTIVE | Noted: 2020-08-01

## 2020-09-14 LAB
EXT PREG TEST URINE: POSITIVE
EXT. CONTROL ED NAV: NORMAL

## 2020-09-14 PROCEDURE — 99212 OFFICE O/P EST SF 10 MIN: CPT | Performed by: OBSTETRICS & GYNECOLOGY

## 2020-09-14 PROCEDURE — 81025 URINE PREGNANCY TEST: CPT | Performed by: EMERGENCY MEDICINE

## 2020-09-14 PROCEDURE — 73650 X-RAY EXAM OF HEEL: CPT

## 2020-09-14 PROCEDURE — 73620 X-RAY EXAM OF FOOT: CPT

## 2020-09-14 PROCEDURE — 99284 EMERGENCY DEPT VISIT MOD MDM: CPT

## 2020-09-14 PROCEDURE — 99285 EMERGENCY DEPT VISIT HI MDM: CPT | Performed by: EMERGENCY MEDICINE

## 2020-09-14 RX ORDER — ACETAMINOPHEN 325 MG/1
975 TABLET ORAL ONCE
Status: COMPLETED | OUTPATIENT
Start: 2020-09-14 | End: 2020-09-14

## 2020-09-14 RX ADMIN — ACETAMINOPHEN 975 MG: 325 TABLET ORAL at 13:01

## 2020-09-14 NOTE — Clinical Note
Rafia Sims was seen and treated in our emergency department on 9/14/2020  Diagnosis:     Landon Primrose    She may return on this date: 09/17/2020         If you have any questions or concerns, please don't hesitate to call        Luis Roberto, DO    ______________________________           _______________          _______________  Hospital Representative                              Date                                Time

## 2020-09-14 NOTE — ED NOTES
Patient provided with crutches and crutches training  Patient verbalized understanding and demonstrated understanding        Thom Wylie RN  09/14/20 [id number]

## 2020-09-14 NOTE — LETTER
Proof of Pregnancy Letter    Kiki Manzo  1985  [address]        09/14/20      Lana Purcell is a patient at our facility  Kiki Manzo Estimated Date of Delivery: 3/24/21       Any questions or concerns, please feel free to contact our office      Sincerely,     [facility]

## 2020-09-14 NOTE — PATIENT INSTRUCTIONS
El Primer Trimestre  (hasta 14 semanas)   BRAND BEBÉ   · Brand bebé comienza a desarrollarse cuando el óvulo y un espermatozoide se unen  · Brand bebé crece dentro de brand útero (también llamado tu vientre)  Flota dentro de mehnaz bolsa de agua - llamado el saco amniótico  El bebé está conectado a ti por un cordón umbilical que va desde el vientre del bebé a la placenta  La placenta se une a brand útero y la placenta es donde [place], oxígeno y alimentos cruzan encima de usted a brand bebé  · Cosas pearl drogas, alcohol y tabaco pueden también cruzar de usted a brand bebé a través de la placenta  Es importante evitar estas cosas, pearl pueden ser perjudiciales para cómo tu bebé se desarrolla y crece  · Al final del primer mes, late el corazón de brand bebé  El bebé es aproximadamente del tamaño de un trozo de arroz  · Al final del otto mes, todos los órganos del bebé (corazón, pulmones, cerebro, cráneo) trinh formado completamente  Ahora sólo tienen que seguir madurando y creciendo  El bebé es aproximadamente del tamaño de [place]  · Al final del tercer mes, brand bebé es de aproximadamente 4 pulgadas de ana! TU CUERPO   · Brand período se detiene - esto puede ser la primera señal que tienes que está [place]   · Tus pechos pueden volverse dolorido y [place]  · Se puede sentir muy cansada  · Usted puede tener un malestar estomacal con náuseas o vómitos que pueden ocurrir en cualquier momento del día - o a veces scar todo el día  · Usted puede sentirse malhumorado y gruñón  También puede sentir miedo o marcum  [place] es normal y natural    · Usted puede perder o ganar peso  [place] está jonnie, siempre y cuando usted no está perdiendo o ganando [employer]          [place] Trimestre  (14-28 semanas)   BRAND BEBÉ   · 4to mes   · brand bebé tiene pestañas y drew   · brand bebé patea, se mueve y se traga   · brand bebé es 6 a 7 pulgadas de ana   · 5to mes   · brand bebé tiene uñas   · brand bebé [place] a dormir y despertar ciclos   · bebé [place] a ser Aicha Ka Suzzane Lack (aunque no siempre sientes lo) girando de lado a lado y [place]   · barriga bebé es de 8 a 12 pulgadas de ana y pesa en cualquier lugar de ½ a 1 pascual   · 6to mes   · los ojos de barriga bebé están an completamente formados y pronto comenzarán a abrir y cerrar   · la piel del bebé es Arturo [place] y [place] y [place] con pelo bebeto y [place] llamado "lanugo"   · bebé sigue siendo muy activo y que debe estar sintiendo muy jonnie y muy a menudo   · barriga bebé es de 11 a 14 pulgadas de ana     TU CUERPO   · Nauseas del embarazo usualmente comienza a desaparecer y las mujeres generalmente comienzan a subir de peso más rápidamente  · Puede comenzar a tener estrías en el vientre o senos que pueden ser "picores"  Frotarlas loción sobre ellos para ayudar a aliviar la Naomi House  · Tu flujo vaginal puede llegar a ser de color blanquecino  · Usted puede ser estreñido  Intente aumentar la Emre Schwab, o puede skye mehnaz pastilla de suavizante de heces (pearl Colace) para aliviar el malestar  · Puede comenzar a tener ardor de estómago  Medicamentos pearl Tums o Maalox pueden ayudar a aliviar esto  Trate de permanecer en mehnaz posición sentada por lo menos mehnaz hora después de las comidas para disminuir la acidez estomacal    · Deberías probar a 8 horas de sueño en la noche, además de mehnaz siesta scar el día si es posible  · No deberías sentarte scar más de dos horas sin skye un descanso para estirar las piernas  El Tercer Trimestre  (28-42 semanas)   BARRIGA BEBÉ   · barriga bebé chupa barriga dedo ahora! · barriga bebé puede oír voces y responder al tacto    habla con [place]!!   · el cerebro de barriga bebé crece y se desarrolla más en los últimos 2 meses de embarazo   · radha y [place] del bebé son Lexie Barley y flexibles para que quepan por el canal del parto   · los movimientos del bebé cambian hacia el final del embarazo porque hay menos espacio para patear y estiramientos en tu vientre   · los pulmones del bebé no están completamente desarrollados y totalmente preparados para respirar por baljinder propios hasta el último 3-4 semanas antes de barriga fecha de vencimiento     TU CUERPO   · barriga vientre está creciendo mucho ahora   · será más difícil dormir jonine de noche o ser tan activos pearl eres generalmente   · puede sudar más de lo habitual   · serás más desequilibrado    tenga cuidado de no caer! · Usted puede desarrollar hemorroides (qué pueden ser dolorosas y hacen difícil sentarse)   · los dos últimos meses del embarazo puede ser muy incómodos con corey de [place], corey de [place] y ardor de estómago   · Puedes empezar a tener contracciones    mientras son irregulares y Yolie de 5 por hora, esto es mehnaz parte normal de barriga cuerpo a punto de tener un bebé   · el mili uterino puede empezar a dilatar y abrir [facility]    para estar listo para el parto   · Usted puede encontrarse que necesitan hacer orinar muy a menudo    porque el bebé está presionando mucho la vejiga   · Usted puede quedarse corta de respiracion más rápido de lo mykel          Mi bebé está desarrollando normalmente? ESTUDIOS GENETICO      Mehnaz de las preocupaciones que muchas mujeres tienen acerca de barriga embarazo es si barriga bebé se está desarrollando normalmente  Existen varias pruebas diferentes que podemos usar para tratar de ayudar y determinar si los bebés están desarrollando normalmente o no  Algunas mujeres tienen un riesgo más alto para que barriga bebé se desarrollan anormalmente (a veces llamado un defecto de nacimiento), dominic le puede pasar a CUALQUIER  Es por eso que ofrecemos estas pruebas a TODAS las mujeres scar baljinder [place]  Ninguno de [place] exámenes son requerido  Es barriga decisión cuáles puede elegir o NO eligir ninguno scar barriga embarazo  Algunas de estas pruebas sólo están disponibles en un determinado momento scar barriga embarazo, así que es importante skye decisiones sobre las pruebas qué desea temprano en el Jazmyne Grimm   Aquí hay información sobre estas pruebas diferentes para ayudarle a skye decisiones sobre si alguna de estas pruebas son [place] para usted  * ANATOMIA ULTRASONIDO : esta ultrasonido se realiza entre 18 a 25 semanas y Lisa de cerca a todas partes de barriga bebé y piezas para buscar signos de cualquier desarrollo anormal; el ultrasonido no es perfecto y NO PUEDE detectar TODOS los tipos de defectos de nacimiento (especialmente síndrome de Down) - dominic es mehnaz prueba [place]  * PROYECCIÓN SECUENCIAL: esta es realmente mehnaz combinación de pruebas que incluyen un ultrasonido que mide la parte posterior del mili de barriga bebé [employer] semanas 11-13 y análisis de lelia de usted en que mehnaz y otra vez entre las semanas 15-22; Radha examen puede ayudar a decirnos el riesgo para barriga bebé se ve afectada por ciertas anomalías cromosómicas o defectos congénitos  * CUÁDRUPLE PANTALLA: radha examen se hace con análisis de lelia sólo de usted entre 15-22 semanas de [facility]; puede ayudar a decirnos el riesgo para barriga bebé se ve afectada por ciertas anomalías cromosómicas o defectos congénitos  * CELULAR-JOEY DE ADN : esta prueba se realiza con análisis de lelia sólo de usted cualquier momento después de 10 semanas de embarazo; es muy preciso al decirnos si barriga bebé tiene mehnaz maría probabilidad de síndrome de Down u otras anomalías del cromosoma dominic es generalmente reservado para las mujeres que tienen un factor de alto riesgo para un bebé con mehnaz anormalidad del cromosoma  * ALLIE MATERNAL senior living-FETO PROTEINA PANTALLA: radha examen se hace con el solo análisis de Karsten de entre 15-22 semanas de [place], nos ayuda a Orlena Eves idea si barriga juanito esta desarrollando un defecto de nacimiento pearl yina bifida       * AMNIOCENTESIS : Esta prueba implica el uso de mehnaz aguja muy wojciech que se inserta en el útero para sacar un poco de líquido alrededor de barriga bebé para la prueba; se puede realizar cualquier momento después de 16 semanas de embarazo; [facility] nos dice con certeza si barriga bebé tiene defectos de nacimiento particular (sobre todo anormalidades del cromosoma); hay un pequeño riesgo de aborto espontáneo que [place] cuando carri tiene esta prueba realizada; esta prueba es generalmente reservada para las mujeres que tienen un factor de alto riesgo para un bebé con mehnaz anormalidad  Las mujeres que tienen un riesgo más alto para los bebés que se desarrollan anormalmente (a veces llamado un defecto de nacimiento) incluir a las mujeres con los siguientes:   · 26 años de edad o mayores   · Obesidad en el momento de la sanjuana   · Diabetes en el momento de la sanjuana   · Un ulysses anterior con un defecto de nacimiento   · Tomando medicamentos que pueden causar un defecto de nacimiento     Aquí están algunas preguntas importantes para hacerse al decidir que (eventualmente) de pruebas quiero tener  · ¿Quiero saber antes de nacer si mi bebé tiene un defecto de nacimiento? · ¿Qué haré con esta información si el resultado muestra mehnaz gran oportunidad para un defecto de nacimiento? · ¿Cómo aprender acerca de un defecto de nacimiento me ayudaría a skye decisiones sobre mi embarazo? · ¿Estas pruebas me ayudará sentir más tranquilos o más ansiedad y miedo?              [address] está mehnaz lista de [employer] que son seguros para skye scar el embarazo sin tener que discutir con el médico o enfermera:     ·        Tylenol/Acetaminophen (dolor de radha [employer])   ·        Benadryl/Diphenhydramine (alergias, congestión nasal, insomnio, picazon)   ·        Claritin/Loratidine (alergias, congestión nasal)   ·        Zyrtec/Cetirizine (alergias, congestión nasal)   ·        Robitussin/Guaifenesin regular (tos)   ·        Sudafed/Pseudoephedrine (congestión nasal)   ·        Colace/Docusate sodium (estreñimiento)   ·        Maalox/Magnesium hydroxide (acidez, indigestión)   ·        Zantac/Ranitidine (acidez, indigestión)   · Pepcid/Famotidine (acidez, indigestión)   ·        Tums/Calcium carbonate Marycarmen Mak, náuseas)   ·        Kaopectate/Bismuth subsalicylate (diarrea)   ·        Immodium/Loperamide (diarrea)   ·        Vitamina B6/Pyrodoxine (náuseas)   ·        Doxylamine/Unisom (náuseas, insomnio)     Debe discutir con nuestra enfermera practicante o angel luis de nuestros médicos antes de skye cualquier otro medicamento  NUTRICION EN EL EMBARAZO  [place] nutrición es mehnaz parte importante de tener un embarazo saludable y un bebé nadine  Usted debe seguir mehnaz dieta saludable que incluyen los siguientes:   · Verduras (que son oscuros ann y frondoso): al menos 2 raciones cada día   · Proteína (carne, huevos, frijoles, nueces, mantequilla de Mc[place]): 3-4 raciones cada día   · Granos panes/todo (pan, pasta, arroz, tortillas, farhan): 3 porciones cada día   · Productos lácteos ([place], yogur, [place]-barre): 3-4 raciones cada día   · De agua: 6-8 vasos por día   · Calorías: aproximadamente 2000 a 2200 calorías por día    AUMENTO DE PESO   Aumento de peso recomendado para usted scar barriga embarazo se basa en el índice de masa corporal ([address]) en el momento en que usted Clydene Denmark  Aumento de peso recomendado de [address] antes del embarazo   Bajo peso [facility] inferior a 18,5) 28 a 40 libras   Normal (IMC 18 5-24 9) de peso 25 a 35 libras   Sobrepeso (IMC 25-29 9) 15 a 25 libras   Ciro ([address] de 30 o mayor) 11 a [address] LOS ALIMENTOS   Es muy importante comer sólo alimentos preparados en forma carrera scar el embarazo pearl usted y barriga bebé tienen un riesgo más alto de lo habitual para ser afectados por enfermedades transmitidas por los alimentos   Siga estos pasos para asegurarse de que usted y barriga bebé estén a roseanne de las enfermedades transmitidas por los alimentos scar el embarazo:   · davon jonnie las mary con agua tibia y jabón antes y después de manipular cualquier alimento   · Giulia Manuelito de cortar, platos, utensilios y mostradores con [place] y jabón antes y después de preparar cualquier alimento   · enjuague de frutas crudas y verduras minuciosamente bajo chorro de agua antes de comer   · [employer] y la carne cruda separada de otros alimentos y usar tableros/utensilios de josé luis diferentes para manejar carne cruda de otros alimentos   · poner alimentos cocidos en un plato recién limpio   · Cocine todos los alimentos jonnie   · Deseche los alimentos que se carbajal dejado hacia fuera para más de 2 horas   · Refrigere o congele alimentos que pueden echar a perder     Hay miguel a alimentarias particulares riesgos que tener en cuenta y evitar ya que pueden causar grave dañan a barriga ulysses georgie  * Listeria (mehnaz bacteria perjudicial)   · no comer salchichas o embutidos (a menos que esté recalentados hasta cocer al vapor caliente)   · no coma quesos blandos (tales pearl Feta, Brie, Camembert) a menos que específicamente se etiquetan pearl siendo "hecho con [place] Speedy"   · no melinda leche cruda (sin pasteurizar)   · no comer patés refrigerados o se separa de la carne   · no coma mariscos ahumados refrigerados a menos que sea en un plato cocinado pearl mehnaz cacerola    * Nasim (un metal que se encuentra en ciertos pescados en niveles altos)   · no coma tiburón, lofolátilo, caballa o pez mariza   · no coma más de 12 onzas por semana de camarón, salmón, abadejo o bagre   · al comer atún, puede tener hasta 6 onzas por semana de atún enlatado o [place] a 12 onzas de atún enlatado    * Toxoplasma (parásito dañino)   · carne de cook o antes de comer   · usar guantes jardinería o manipulación de arena del arenero infantil   · si tienes un luis, pídale a alguien que cambie la caja de arena scar el [facility]  Si tienes que limpiar usted mismo, asegúrese de [facility] mary después con agua tibia y Deanna     · no conseguir un luis nuevo scar el [facility]            EJERCICIO Y [facility]    El ejercicio tiene muchos benficios para ustedDurwood Agreste:   · Mejora tu postura y apariencia   · Nat el dolor de espalda   · Mejora la circulacion   · Aumenta la flexibilidad   · Disminuye el estres   · Josee Chancy ayuda a sentirse jonnie   · Te da energia   · Ayuda a que pueda dormir   · Romana Borders y estira tus músculos, que pueden ayudar a aliviar los malestares del embarazo   · Aumenta barriga resistencia y flexibilidad     Más nadine que va en mano de obra, el más rápido y más fácil que será barriga recuperación después del nacimiento  El ejercicio puede ser cruz para barriga bebé [place]  Tanto de se siente tranquilo y marcum después de un entrenamiento  Además, barriga bebé le gusta el movimiento  Cuanto tiempo de ejercicio   Consulte con barriga proveedor de matt antes de comenzar y asegurar de que usted esta lo suficientemente natalie pearl para empezar hacer ejercicio  Si haces ejercicios antes del embarazo, es aceptable para hacer ejercicio moderado de 30 minutos o más cada día  Si no, empezar con 20 minutos al día, 3 veces por semana  Si se activan antes de barriga embaraza, es seguro continuar  Si no estas acustombrada a correr, el embarazo no es un buen momento para empezar  El Primer Trimestre   En los primeros miguel a meses puede sentirse cansada y enferma de barriga estómago  Ejercicio sólo cuando se sienta mejor  Sin embargo, incluso un poco de [place] puede aumentar barriga energía  Considere mehnaz caminata, estiramiento o poco de yoga  El OTROUZA Trimestre   Podría tener más [place], así que aprovechar los tiempos cuando se siente jonnie para hacer actividades para disfrutar  Seguros ejercicios son aerobicos de bajo impacto (que elevan barriga ritmo cardiaco), fácil de bailar, carminar, nadar, ciclismo estacionario, esquí fácil, y yoga  Ir fácil en deportes de "alto impacto" pearl correr, tenis, o deportes que pueden causar caída, pearl el esquí alpino, o paseos a michael  El Tercer Trimestre   Sentirse mas cansada y Agia Thekla corey de espalda por el peso extra que Joretta Drilling    Tu tercer trimestre es un momento importante para utilizar mehnaz buena postura, doble las rodillas para recoger cosas, y no levante objetos pesados  Normas de Seguridad   · [employer] siempre antes y despues del ejercicio  · Nunca entrenamiento tan rogelio que no puedes hablar al [employer]  · No ejercer en tiempo muy caliente o muy frio  · Beber mucha agua antes, scar, y despues del ejercicio  · Ser conscientes de barriga nivel de comodidad, dejar lo que estas hacienda si tienes dolor, si se sientes debil, o si estas agotada  · Evitar acostarse sobre barriga espalda despues de barriga primer trimester, por que esta posicion puede disminuir el flujo de lelia a barriga utero  NÁUSEAS Y VÓMITOS EN EL EMBARAZO    1  comer comidas y meriendas poco a poco   2  comer cada 1-2 horas para evitar el estómago lleno  3  no saltarse las comidas, evitar el estómago vacío  4  comer un bocado antes de levantarse de la cama  5  Evite alimentos y bebidas con un [employer]  6  evitar la deshidratación: beber suficiente líquido para mantener barriga orina de color amarillo pálido  7  beber líquidos antes de mehnaz comida para minimizar el efecto de un estómago lleno  8  limitar la cantidad de café y bebidas que contienen cafeína  9  eliminar picante, olor, alto de grasa (alimentos fritos), ácido (productos de [place]) y alimentos dulces  10  líquido que contiene angel, menta o naranja puede ser generalmente jonnie tolerado  11  snacks y comidas que contienen proteína baja en grasa (janeth magras, pescado, aves de hines y [place]) junto con comer carbohidratos fácilmente digeribles (frutas, arroz, pan kayden, galletas y cereal seco) pueden ser mejor toleradas  12  los alimentos con el jengibre pueden ser [employer]  Trate de usar polvo de raíz de jengibre, [place], o extraer (hasta 1000mg por día)  13  beber líquidos en pequeñas cantidades    14  trate de skye vitamina B6 (50mg al acostarse, 25mg en la mañana y 25mg en la tarde) con Unisom/doxilamina (25mg al Migel Roland)  [address] y los vómitos persisten, póngase en contacto con la oflenchoa  Stone    1  Es Earlis Ebbs? Las relaciones sexuales regularmente son perfectamente [place] y no le hacen barbie a barriga juanito que esta creciendo  Emmit Crafts y los orgasmos estan jonnie a menos que usted tenga algun problema medico   Si usted esta preocupada, discutalo con barriga proveedor de matt  2  [place] costello seguido esta jonnie tener relaciones sexuales? Tener relaciones sexuales frecuentemente no le hace barbie a barriga juanito si usted esta teniendo un embarazo saludable  3  Tener relaciones sexuales puede provocar un aborto involuntario? No hay ninguna informacion que relacione tener un orgasmo con tener un aborto involuntario  Sin embargo, si usted tiene historia de yahaira tenido un aborto involuntario, barriga proveedor de matt le puede recomendar que tenga cuidado y que no tenga relaciones sexuales y orgasmos scar el primer trimestre del [place]  4  Que puede pasar si siento que el juanito se mueve despues de tener relaciones sexuales? No  El juanito esta muy jonnie protegido en el utero  Y, usualmente el juanito se mueve mucho sin importar lo que usted yee  5  Esta jonnie que mi davis ponga peso sobre mi estomago? No, especialmente cuando barriga estomago empiece a crecer mas  Encuentren otras posiciones para tender relaciones sexuales scar el embarazo  Traten de tenerrelaciones sexuales acostados de lado o usted puede ponerse encima de barriga davis  No se acueste boca arriba  6  Tener relaciones sexuales puede causare un parto prematuro? Normalmente no  Sin embarago, los orgasmos causan que el utero tenga contracciones ligeras si usted esta cerca al Mi de kendall a dennis    Si usted tiene historia de haver tenido un parto prematuro, barriga proveedor de [employer] a recomendar que no tenga relaciones sexuales y [facility]  Tambien la estimulacion de los senos causa que el utero se contraiga si usted esta cerca del [place] de kendall a dennis  Preguentele a barriga proveedor de matt si esto es seguro para usted  [address] son seguras florence el [facility]? No   Nunca deje que barriga davis le sople en barriga vagina  No ponga ninjun objeto en barriga vagina que le pueda hacer barbie o causar mehnaz infeccion  Si usted tiene sangrado excesivo o si barriga adi se rompe mientras esta teniendo [employer], detengase y llame a barriga proveedor de matt inmediatamente  8  Que pasa si yo no tengo ganas de tener relaciones sexuales? Hable francamente con barriga davis  Al comienzo del [facility], usted puede estar muy cansada o puede sentirse mal del estomago  En las ultimas semanas del [facility] cambio fisicos que usted esta teniendo pueden hacerla sentir muy grand o muy incomoda para tener relaciones sexuales  Para gely tiempo puede que usted radha pensando mas en la maternidad que en tener relaciones sexuales  9  Hay algunas ocasiones en las que debiera evitar tener relaciones sexuales? Si, si:  · tener relaciones sexuales es doloroso  · usted tiene sangrado vaginal  · usted tiene parto prematuro  · Barriga adi se rompio  · usted esta embarazada con gemelos o mas  · usted o barriga davis tienen cualquier otra enfermedad venera o mehnaz enfermedad transmitida por relaciones sexuales  · usted no puede encontrar mehnaz posicion comoda          [place] FLORENCE EL EMBARAZO  Llame a Werner Salma ching [phone] para cualquiera de los siguientes:    1  sangrado vaginal  2  Dolor abdominal devon que no desaparece  3  Fiebre (más de 100 4 y no se eren con Tylenol)  4  Vómitos persistentes que sorto más de 24 horas  5  Dolor de pecho  6  Dolor o ardor al orinar  7  Dolor de radha severo que no se resuelve con Tylenol  8  Visión borrosa o natanael puntos en barriga visión    9  Hinchazón repentina de barriga miguelito o mary  10  Enrojecimiento, hinchazón o dolor en mehnaz pierna  11  Un aumento de peso repentino en pocos días  12  Contar los movimientos fetales del bebé  (después de 28 semanas o el sexto mes de embarazo)  15  Mehnaz pérdida de líquido acuoso de la vagina: puede ser un chorro, un goteo o mehnaz humedad continua  14  Después de 20 semanas de embarazo, calambres rítmicos (más de 4 por hora) o menstruales pearl dolor bajo / Sha Dyers MATERNA     BENEFICIOS PARA LOS BEBÉS   · sistemas inmunológicos más alley (menos alergias, eczema, asma y cáncer infantil)   · menos diarrea y estreñimiento u otras enfermedades GI   · menos resfriados e infecciones del oído   · mejor visión y dientes (menos cavidades)   · mejora el IQ   · menores tasas de diabetes y obesidad en la infancia     BENEFICIOS PARA LAS MADRES   · promueve la pérdida de peso más rápida después del parto   · cecy riesgo para la depresión postparto   · cecy riesgo para los cánceres Theo, útero y ovario   · cecy riesgo para la osteoporosis en desarrollo con la edad   · siempre es más fácil que fórmula - correcto, limpio, y la temperatura adecuada   · menos costosa que la fórmula es gratis!!!     CLAVES PARA MEHNAZ LACTANCIA EXITOSA   · mantener bebé piel a piel hasta después de la primera alimentación evento   · tener a bebé en barriga cuarto con usted scar barriga estadía en el hospital después del parto   · evitar cualquier botella de alimentación (a menos que sea médicamente necesario)   · limitar el uso de chupones y pañales   · Pida ayuda si usted está teniendo problemas    consultores de lactancia (que se especializan en la lactancia) están disponibles para ayudarle a   · mehnaz dieta saludable para mamá    comiendo mehnaz variedad de alimentos y raciones con moderación     COSAS QUE DEBES SABER SOBRE LA LACTANCIA   · mayoría de los medicamentos se considera compatible con la lactancia materna por [address] [facility] consultarlo con barriga médico o en lactancia antes de skye un medicamento nuevo    para estar seguro es seguro   · alcohol (cerveza, vino, licor) puede transmitirse de la madre al bebé a través de la [place]    un ocasional, social bebida es considerado aceptable por [address]    más que eso deben evitarse   · la lactancia materna es NO es un método para evitar embarazo   · nicotina (cigarrillos) puede pasar de la madre al bebé a través de la [place]    sin embargo, para las [employer], es aún más saludable para amamantar que use la fórmula   · cafeína debería limitarse a 1-3 tazas por día    incluye café, refrescos, bebidas energéticas           VACUNAS FLORENCE EL EMBARAZO    TDAP  La tos ferina (o tos [place]) puede ser grave para cualquier persona, dominic para barriga recién nacido, puede ser mortal  Hasta 20 recién nacidos mueren cada año en los [place] debido a la tos [place]  Aproximadamente la mitad de los bebés menores de 1 año de edad que contraen tos ferina necesitan tratamiento en el hospital  Cuanto más joven es el bebé cuando él o alvaro son la tos Rangel Krill probable es que él o alvaro tendrá que ser tratado en un hospital  Cuando usted recibe la vacuna contra la tos ferina (Tdap) florence barriga embarazo, barriga cuerpo creará anticuerpos protectores y algunos de ellos pasan a barriga bebé antes de nacer  Estos anticuerpos pueden ayudar a proteger a barriga bebé contraigan la tos ferina hasta que tienen edad suficiente para recibir la vacuna ellos mismos (generalmente alrededor de 6 meses de edad)  INFLUENZA  Cambios en las funciones inmunológica, del corazón y pulmón florence el embarazo te hacen más susceptible a padecer seriamente enfermo de la gripe  Coger la gripe también aumenta las posibilidades de problemas graves para barriga bebé en desarrollo, incluyendo la entrega y el trabajo de [place]   Se recomienda que todas las mujeres que están embarazadas florence la temporada de gripe deben recibir mehnaz vacuna contra la influenza  USO DE CINTURÓN DE SEGURIDAD EN EL EMBARAZO    Si usted está embarazada o no, deben usar el cinturón de seguridad cada vez que estás en un coche  El cinturón de seguridad es la mejor protección para ambos usted y barriga ulysses georgie  Para utilizar correctamente el cinturón de seguridad scar el embarazo, puede que deba ajustar el asiento delantero varias veces pearl crece de modo que siempre hay por lo menos 10 pulgadas entre el centro de barriga pecho y el carol de manejar o del panel de control, y llegar cómodamente a los pedales  La moran del hombro deben colocar sobre el pecho (entre baljinder senos) y lejos de barriga mili  El cinturón de seguridad debe fijarse por debajo de barriga vientre para que se ajuste cómodamente a través de las caderas y la pelvis ósea  NO debe desactivar la bolsas de aire de barriga vehículo  Bolsas de aire y cinturones de seguridad funcionan mejor cuando se utilizan juntos para proteger a barriga ulysses del unborn

## 2020-09-14 NOTE — PROGRESS NOTES
Virtual Brief Visit    Assessment/Plan:    Problem List Items Addressed This Visit     None      Visit Diagnoses     Prenatal care in first trimester    -  Primary    Relevant Orders    Prenatal Panel    Hemoglobin Electrophoresis    Hepatitis C antibody    Ambulatory referral to social work care management program    Ambulatory Referral to Maternal Fetal Medicine                Reason for visit is   Chief Complaint   Patient presents with    Initial Prenatal Visit        Encounter provider Jack Hall RN    Provider located at [address] [place] [id number] [phone]    Recent Visits  No visits were found meeting these conditions  Showing recent visits within past 7 days and meeting all other requirements     Today's Visits  Date Type Provider Dept   20 Telemedicine Jack Hall RN  [address] [facility] [address] [facility] today's visits and meeting all other requirements     Future Appointments  No visits were found meeting these conditions  Showing future appointments within next 150 days and meeting all other requirements        After connecting through telephone, the patient was identified by name and date of birth  Toi Dumont was informed that this is a telemedicine visit and that the visit is being conducted through telephone  My office door was closed  No one else was in the room  She acknowledged consent and understanding of privacy and security of the platform  The patient has agreed to participate and understands she can discontinue the visit at any time  Patient is aware this is a billable service  OBSTETRIC INTAKE VISIT    Toi Dumont presents today via telehealth for initial  intake at 12w5d    History obtained from patient and she reports it as follows:    Past Medical History:   Diagnosis Date    Pyelonephritis      Past Surgical History:   Procedure Laterality Date     SECTION  GASTRIC BYPASS      sleeve     OB History    Para Term  AB Living   4 3 3     3   SAB TAB Ectopic Multiple Live Births         0 3      # Outcome Date GA Lbr Darian/2nd Weight Sex Delivery Anes PTL Lv   4 Current            3 Term 11/09/10 39w0d  3629 g (8 lb) M CS-Unspec Spinal N FRANCI      Birth Comments: Repeat    2 Term 06 39w0d  3884 g (8 lb 9 oz) M CS-Unspec Spinal N FRANCI      Birth Comments: Repeat c/section   1 Term 04 40w0d  3374 g (7 lb 7 oz) F CS-Unspec Spinal N FRANCI     Social History     Tobacco Use    Smoking status: Never Smoker    Smokeless tobacco: Never Used   Substance Use Topics    Alcohol use: Not Currently     Frequency: Never     Binge frequency: Never    Drug use: Never       Current Outpatient Medications   Medication Instructions    nitrofurantoin (MACROBID) 100 mg, Oral, Daily at bedtime    Prenatal Vit-Fe Fumarate-FA (Prenatal Vitamin) 27-0 8 MG TABS 1 tablet, Oral, Daily       No Known Allergies    BIENVENIDO 3/24/2021    Review of Systems:  Denies vaginal bleeding or leaking of fluid  Denies uterine contractions or abdominal pain  Exam:  LMP 2020   Breastfeeding Unknown        Plan  1  Prenatal care in first trimester  - Prenatal Panel  - Hemoglobin Electrophoresis  - Hepatitis C antibody; Future  - Ambulatory referral to social work care management program  - Ambulatory Referral to Maternal Fetal Medicine for sequential screen, appointment scheduled for 9/15/2020   2  Return in 1 week  for next prenatal PN H/P visit  3  Full OB physical and pelvic exam to be done at next visit  4  Referral placed for  consultation    5  Reviewed the following educational topics with patient:   -routine prenatal visit/ultrasound/labwork schedule   -nutritional demands of pregnancy, healthy dietary habits   -listeria, toxoplasmosis, seafood precautions   -weight gain expectations (based on pre-pregnant BMI)   -exercise, rest, and sexual activity during pregnancy   -abstinence from alcohol, tobacco, and illegal drugs   -common discomforts of pregnancy and appropriate management   -OTC medications safe to use in pregnancy   -genetic screening options   -WIC referral   -symptoms to report to [facility] provider    -signs of PTL    -vaginal bleeding/leaking of fluid    -severe n/v-unable to tolerate ANY food/fluids for more than 24 hours  Explained to patient how to contact OB provider after office hours  I spent 50 minutes directly with the patient during this visit   Visit comparable to [address], RN  9/14/2020    VIRTUAL VISIT DISCLAIMER    Pawan Estradauntain acknowledges that she has consented to an online visit or consultation  She understands that the online visit is based solely on information provided by her, and that, in the absence of a face-to-face physical evaluation by the physician, the diagnosis she receives is both limited and provisional in terms of accuracy and completeness  This is not intended to replace a full medical face-to-face evaluation by the physician  Pawan Brooklyn understands and accepts these terms

## 2020-09-15 NOTE — ED PROVIDER NOTES
History  Chief Complaint   Patient presents with    Foot Pain     states yesterday on the steps left leg slipped and the heel hit the steps; states still hurts today, when stretching it out pain will go up the leg  History provided by:  Patient and relative   used: Yes (family intpreting at bedside)    Ankle Pain   Location:  Ankle  Time since incident:  2 hours  Injury: yes    Mechanism of injury: fall    Fall:     Fall occurred:  Down stairs    Height of fall:  2 steps    Impact surface:  [employer] of impact:  Feet    Entrapped after fall: no    Ankle location:  L ankle  Pain details:     Quality:  Aching    Radiates to:  Does not radiate    Severity:  Moderate    Onset quality:  Sudden    Duration:  2 hours    Timing:  Constant    Progression:  Unchanged  Chronicity:  New  Dislocation: no    Foreign body present:  No foreign bodies  Tetanus status:  Unknown  Relieved by:  Nothing  Worsened by:  Rotation  Ineffective treatments:  None tried  Associated symptoms: no back pain, no fatigue, no fever, no muscle weakness, no neck pain, no numbness, no stiffness and no tingling    Risk factors: no frequent fractures, no known bone disorder and no obesity        Prior to Admission Medications   Prescriptions Last Dose Informant Patient Reported? Taking?    Prenatal Vit-Fe Fumarate-FA (Prenatal Vitamin) 27-0 8 MG TABS 2020 at Unknown time  No Yes   Sig: Take 1 tablet by mouth daily   nitrofurantoin (MACROBID) 100 mg capsule 2020 at Unknown time  No Yes   Sig: Take 1 capsule (100 mg total) by mouth daily at bedtime      Facility-Administered Medications: None       Past Medical History:   Diagnosis Date    Pyelonephritis        Past Surgical History:   Procedure Laterality Date     SECTION      GASTRIC BYPASS      sleeve       Family History   Problem Relation Age of Onset    Diabetes Mother     Diabetes Father     No Known Problems Sister     No Known Problems Brother     No Known Problems Daughter     No Known Problems Son     Cancer Neg Hx      I have reviewed and agree with the history as documented  E-Cigarette/Vaping    E-Cigarette Use Never User      E-Cigarette/Vaping Substances    Nicotine No     THC No     CBD No     Flavoring No     Other No     Unknown No      Social History     Tobacco Use    Smoking status: Never Smoker    Smokeless tobacco: Never Used   Substance Use Topics    Alcohol use: Not Currently     Frequency: Never     Binge frequency: Never    Drug use: Never       Review of Systems   Constitutional: Negative  Negative for chills, fatigue and fever  HENT: Negative  Negative for rhinorrhea, sore throat, trouble swallowing and voice change  Eyes: Negative  Negative for pain and visual disturbance  Respiratory: Negative  Negative for cough, shortness of breath and wheezing  Cardiovascular: Negative  Negative for chest pain and palpitations  Gastrointestinal: Negative for abdominal pain, diarrhea, nausea and vomiting  Genitourinary: Negative  Negative for dysuria and frequency  Musculoskeletal: Negative  Negative for back pain, neck pain, neck stiffness and stiffness  Skin: Negative  Negative for rash  Neurological: Negative  Negative for dizziness, speech difficulty, weakness, light-headedness and numbness  Physical Exam  Physical Exam  Vitals signs and nursing note reviewed  Constitutional:       General: She is not in acute distress  Appearance: She is well-developed  HENT:      Head: Normocephalic and atraumatic  Eyes:      Conjunctiva/sclera: Conjunctivae normal       Pupils: Pupils are equal, round, and reactive to light  Neck:      Musculoskeletal: Normal range of motion and neck supple  Trachea: No tracheal deviation  Cardiovascular:      Rate and Rhythm: Normal rate and regular rhythm  Pulmonary:      Effort: Pulmonary effort is normal  No respiratory distress  Breath sounds: Normal breath sounds  No wheezing or rales  Abdominal:      General: Bowel sounds are normal  There is no distension  Palpations: Abdomen is soft  Tenderness: There is no abdominal tenderness  There is no guarding or rebound  Musculoskeletal: Normal range of motion  General: No tenderness or deformity  Skin:     General: Skin is warm and dry  Capillary Refill: Capillary refill takes less than 2 seconds  Findings: No rash  Neurological:      Mental Status: She is alert and oriented to person, place, and time  Psychiatric:         Behavior: Behavior normal          Vital Signs  ED Triage Vitals [09/14/20 1115]   Temperature Pulse Respirations Blood Pressure SpO2   98 5 °F (36 9 °C) 77 18 126/65 100 %      Temp src Heart Rate Source Patient Position - Orthostatic VS BP Location FiO2 (%)   -- Monitor Lying Left arm --      Pain Score       7           Vitals:    09/14/20 1115   BP: 126/65   Pulse: 77   Patient Position - Orthostatic VS: Lying         Visual Acuity      ED Medications  Medications   acetaminophen (TYLENOL) tablet 975 mg (975 mg Oral Given 9/14/20 1301)       Diagnostic Studies  Results Reviewed     Procedure Component Value Units Date/Time    POCT pregnancy, urine [[id number]]  (Normal) Resulted:  09/14/20 1139    Lab Status:  Final result Updated:  09/14/20 1139     EXT PREG TEST UR (Ref: Negative) Positive     Control Valid                 XR heel / calcaneus 2+ views LEFT   ED Interpretation by Eh Carpenter DO (09/14 1243)   No acute fracture or dislocation appreciated      Final Result by Aldo Bassett MD (09/14 1337)      No acute osseous abnormality  Workstation performed: [id number]         XR foot 2 views LEFT   ED Interpretation by Eh Carpenter DO (09/14 1243)   No acute fx or dislocation appreciated      Final Result by Candido Hollis MD (09/14 1340)      No acute osseous abnormality        Workstation performed: [id number] Procedures  Procedures         ED Course                           SBIRT 20yo+      Most Recent Value   SBIRT (24 yo +)   In order to provide better care to our patients, we are screening all of our patients for alcohol and drug use  Would it be okay to ask you these screening questions? Yes Filed at: 09/14/2020 1131   Initial Alcohol Screen: US AUDIT-C    1  How often do you have a drink containing alcohol?  0 Filed at: 09/14/2020 1131   2  How many drinks containing alcohol do you have on a typical day you are drinking? 0 Filed at: 09/14/2020 1131   3a  Male UNDER 65: How often do you have five or more drinks on one occasion? 0 Filed at: 09/14/2020 1131   3b  FEMALE Any Age, or MALE 65+: How often do you have 4 or more drinks on one occassion? 0 Filed at: 09/14/2020 1131   Audit-C Score  0 Filed at: 09/14/2020 1131   JUAN: How many times in the past year have you    Used an illegal drug or used a prescription medication for non-medical reasons? Never Filed at: 09/14/2020 1131                  MDM  Number of Diagnoses or Management Options  Ankle sprain:   Pregnancy:   Diagnosis management comments: 22-year-old female who presented for concerns of a left ankle injury after missing a step and falling down 2 steps  States that she was able to catch herself and did not strike her back abdomen or head or neck  She only has pain in her left ankle  Patient was found to be pregnant on screening examination  She states that she knew that she was pregnant is approximately 12 weeks gestation  Denies any vaginal discharge bleeding urinary frequency or dysuria  Patient states she has already establish herself with an OBGYN  Is taking prenatal vitamins  Patient and no obvious fracture or dislocation on her x-ray  Was placed in a stirrup  Post splint application by me showed that she was neurovascularly intact    She was and given crutches and advised the need for follow-up care and strict return precautions were discussed  Patient verbalized understanding and agreement with plan  Amount and/or Complexity of Data Reviewed  Clinical lab tests: ordered and reviewed  Tests in the radiology section of CPT®: ordered and reviewed        Disposition  Final diagnoses: Ankle sprain   Pregnancy     Time reflects when diagnosis was documented in both MDM as applicable and the Disposition within this note     Time User Action Codes Description Comment    9/14/2020 12:49 PM Felipe Adams Add [U03 304Q] Ankle sprain     9/14/2020 12:51 PM Felipe Adams Add [Z34 90] Pregnancy       ED Disposition     ED Disposition Condition Date/Time Comment    Discharge Stable Mon Sep 14, 2020 12:49 PM Ulysees Self PolancoMontesino discharge to home/self care  Follow-up Information     Follow up With Specialties Details Why Contact Info Additional [address] In 1 week  [address], [address] [facility] [address]  [address], [address], [address] [place], [place], [address] [place] [address] Specialists [facility]   [address] [address]  [address] [place] [address], [address], [address] [place] [address], [id number] [phone]          Discharge Medication List as of 9/14/2020 12:51 PM      CONTINUE these medications which have NOT CHANGED    Details   nitrofurantoin (MACROBID) 100 mg capsule Take 1 capsule (100 mg total) by mouth daily at bedtime, Starting Sat 8/8/2020, Until Wed 5/5/2021, Normal      Prenatal Vit-Fe Fumarate-FA (Prenatal Vitamin) 27-0 8 MG TABS Take 1 tablet by mouth daily, Starting Tue 9/1/2020, Normal           No discharge procedures on file      PDMP Review     None          ED Provider  Electronically Signed by           Luis Roberto DO  09/15/20 0800

## 2020-09-17 ENCOUNTER — APPOINTMENT (OUTPATIENT)
Dept: LAB | Facility: HOSPITAL | Age: 35
End: 2020-09-17
Attending: OBSTETRICS & GYNECOLOGY
Payer: COMMERCIAL

## 2020-09-17 ENCOUNTER — TELEPHONE (OUTPATIENT)
Dept: OBGYN CLINIC | Facility: CLINIC | Age: 35
End: 2020-09-17

## 2020-09-17 DIAGNOSIS — Z34.91 PRENATAL CARE IN FIRST TRIMESTER: ICD-10-CM

## 2020-09-17 LAB
ABO GROUP BLD: NORMAL
BASOPHILS # BLD AUTO: 0.1 THOUSANDS/ΜL (ref 0–0.1)
BASOPHILS NFR BLD AUTO: 1 % (ref 0–1)
BILIRUB UR QL STRIP: NEGATIVE
BLD GP AB SCN SERPL QL: NEGATIVE
CLARITY UR: CLEAR
COLOR UR: YELLOW
EOSINOPHIL # BLD AUTO: 0.1 THOUSAND/ΜL (ref 0–0.4)
EOSINOPHIL NFR BLD AUTO: 1 % (ref 0–6)
ERYTHROCYTE [DISTWIDTH] IN BLOOD BY AUTOMATED COUNT: 16 %
GLUCOSE UR STRIP-MCNC: NEGATIVE MG/DL
HBV SURFACE AG SER QL: NORMAL
HCT VFR BLD AUTO: 34.3 % (ref 36–46)
HCV AB SER QL: NORMAL
HGB BLD-MCNC: 11.3 G/DL (ref 12–16)
HGB UR QL STRIP.AUTO: NEGATIVE
KETONES UR STRIP-MCNC: NEGATIVE MG/DL
LEUKOCYTE ESTERASE UR QL STRIP: NEGATIVE
LYMPHOCYTES # BLD AUTO: 3 THOUSANDS/ΜL (ref 0.5–4)
LYMPHOCYTES NFR BLD AUTO: 32 % (ref 25–45)
MCH RBC QN AUTO: 29.4 PG (ref 26–34)
MCHC RBC AUTO-ENTMCNC: 33.1 G/DL (ref 31–36)
MCV RBC AUTO: 89 FL (ref 80–100)
MONOCYTES # BLD AUTO: 0.4 THOUSAND/ΜL (ref 0.2–0.9)
MONOCYTES NFR BLD AUTO: 4 % (ref 1–10)
NEUTROPHILS # BLD AUTO: 5.9 THOUSANDS/ΜL (ref 1.8–7.8)
NEUTS SEG NFR BLD AUTO: 63 % (ref 45–65)
NITRITE UR QL STRIP: NEGATIVE
PH UR STRIP.AUTO: 6 [PH]
PLATELET # BLD AUTO: 259 THOUSANDS/UL (ref 150–450)
PMV BLD AUTO: 10.3 FL (ref 8.9–12.7)
PROT UR STRIP-MCNC: NEGATIVE MG/DL
RBC # BLD AUTO: 3.85 MILLION/UL (ref 4–5.2)
RH BLD: POSITIVE
RUBV IGG SERPL IA-ACNC: >175 IU/ML
SP GR UR STRIP.AUTO: 1.02 (ref 1–1.04)
SPECIMEN EXPIRATION DATE: NORMAL
UROBILINOGEN UA: NEGATIVE MG/DL
WBC # BLD AUTO: 9.5 THOUSAND/UL (ref 4.5–11)

## 2020-09-17 PROCEDURE — 83020 HEMOGLOBIN ELECTROPHORESIS: CPT

## 2020-09-17 PROCEDURE — 86803 HEPATITIS C AB TEST: CPT

## 2020-09-17 PROCEDURE — 36415 COLL VENOUS BLD VENIPUNCTURE: CPT

## 2020-09-17 PROCEDURE — 83540 ASSAY OF IRON: CPT

## 2020-09-17 PROCEDURE — 83550 IRON BINDING TEST: CPT

## 2020-09-17 PROCEDURE — 87086 URINE CULTURE/COLONY COUNT: CPT

## 2020-09-17 PROCEDURE — 82306 VITAMIN D 25 HYDROXY: CPT

## 2020-09-17 PROCEDURE — 82728 ASSAY OF FERRITIN: CPT

## 2020-09-17 PROCEDURE — 80081 OBSTETRIC PANEL INC HIV TSTG: CPT

## 2020-09-17 PROCEDURE — 82310 ASSAY OF CALCIUM: CPT

## 2020-09-18 ENCOUNTER — INITIAL PRENATAL (OUTPATIENT)
Dept: OBGYN CLINIC | Facility: CLINIC | Age: 35
End: 2020-09-18

## 2020-09-18 ENCOUNTER — TELEPHONE (OUTPATIENT)
Dept: PERINATAL CARE | Facility: CLINIC | Age: 35
End: 2020-09-18

## 2020-09-18 VITALS
BODY MASS INDEX: 30.18 KG/M2 | SYSTOLIC BLOOD PRESSURE: 103 MMHG | WEIGHT: 149.4 LBS | TEMPERATURE: 98.2 F | DIASTOLIC BLOOD PRESSURE: 70 MMHG | HEART RATE: 89 BPM

## 2020-09-18 DIAGNOSIS — O23.00 PYELONEPHRITIS DURING PREGNANCY: ICD-10-CM

## 2020-09-18 DIAGNOSIS — Z3A.13 13 WEEKS GESTATION OF PREGNANCY: Primary | ICD-10-CM

## 2020-09-18 DIAGNOSIS — O34.219 PREVIOUS CESAREAN DELIVERY, ANTEPARTUM: ICD-10-CM

## 2020-09-18 DIAGNOSIS — O99.210 OBESITY IN PREGNANCY: ICD-10-CM

## 2020-09-18 DIAGNOSIS — Z98.84 H/O GASTRIC BYPASS: ICD-10-CM

## 2020-09-18 PROBLEM — Z30.9 CONTRACEPTION MANAGEMENT: Status: ACTIVE | Noted: 2020-09-18

## 2020-09-18 PROBLEM — Z36.9 ENCOUNTER FOR FETAL ULTRASOUND: Status: ACTIVE | Noted: 2020-09-18

## 2020-09-18 PROBLEM — Z13.79 GENETIC SCREENING: Status: ACTIVE | Noted: 2020-09-18

## 2020-09-18 LAB
BACTERIA UR CULT: NORMAL
HIV 1+2 AB+HIV1 P24 AG SERPL QL IA: NORMAL
RPR SER QL: NORMAL

## 2020-09-18 PROCEDURE — 87491 CHLMYD TRACH DNA AMP PROBE: CPT | Performed by: NURSE PRACTITIONER

## 2020-09-18 PROCEDURE — 87624 HPV HI-RISK TYP POOLED RSLT: CPT | Performed by: NURSE PRACTITIONER

## 2020-09-18 PROCEDURE — G0145 SCR C/V CYTO,THINLAYER,RESCR: HCPCS | Performed by: NURSE PRACTITIONER

## 2020-09-18 PROCEDURE — 87591 N.GONORRHOEAE DNA AMP PROB: CPT | Performed by: NURSE PRACTITIONER

## 2020-09-18 PROCEDURE — 99213 OFFICE O/P EST LOW 20 MIN: CPT | Performed by: NURSE PRACTITIONER

## 2020-09-18 RX ORDER — NITROFURANTOIN 25; 75 MG/1; MG/1
100 CAPSULE ORAL DAILY
Qty: 30 CAPSULE | Refills: 10 | Status: SHIPPED | OUTPATIENT
Start: 2020-09-18 | End: 2020-10-15 | Stop reason: SDUPTHER

## 2020-09-18 NOTE — PATIENT INSTRUCTIONS
SENALES FLORENCE EL EMBARAZO  Llame a nuestra oficina al [phone] para cualquiera de los siguientes:    1  sangrado vaginal  2  Dolor abdominal devon que no desaparece  3  Fiebre (más de 100 4 y no se eren con Tylenol)  4  Vómitos persistentes que sorto más de 24 horas  5  dolor de pecho  6  Dolor o ardor al orinar  7  Dolor de radha severo que no se resuelve con Tylenol  8  Visión borrosa o natanael puntos en barriga visión  9  Hinchazón repentina de barriga miguelito o mary  10  Enrojecimiento, hinchazón o dolor en mehnaz pierna  11  Un aumento de peso repentino en pocos días  12  Contar los movimientos fetales del bebé  (después de 28 semanas o el sexto mes de embarazo)  15  Mehnaz pérdida de líquido acuoso de la vagina: puede ser un chorro, un goteo o mehnaz humedad continua  14   Después de 20 semanas de embarazo, calambres rítmicos (más de 4 por hora) o menstruales pearl dolor Jackelizabethn Fatimah / Katrina Gomezer

## 2020-09-18 NOTE — PROGRESS NOTES
Nancy Coley presents today for routine OB visit at 13w2d  LH=612/70  Wt=67 8 kg (149 lb 6 4 oz); Body mass index is 30 18 kg/m² ; TWG=-0 272 kg (-9 6 oz)  Fetal Heart Rate: 150  Abdomen: soft, non-tender  She reports no complaints  Denies vaginal bleeding or leaking of fluid  Initial OB labs reviewed as WNL with patient - several results still pending  Additional labwork ordered for hx gastric sleeve  Pap/GC/CT collected today  Order placed for Sequential Screening with MFM  Reviewed common discomforts of pregnancy in first trimester and warning signs  Advised to continue medications and return in 4 weeks        Current Outpatient Medications   Medication Instructions    nitrofurantoin (MACROBID) 100 mg, Oral, Daily    Prenatal Vit-Fe Fumarate-FA (Prenatal Vitamin) 27-0 8 MG TABS 1 tablet, Oral, Daily       Past Medical History:   Diagnosis Date    No known health problems      Past Surgical History:   Procedure Laterality Date     SECTION  , ,     SLEEVE GASTROPLASTY  2018     OB History        4    Para   3    Term   3       0    AB   0    Living   3       SAB   0    TAB   0    Ectopic   0    Multiple   0    Live Births   3               Social History     Tobacco Use    Smoking status: Never Smoker    Smokeless tobacco: Never Used   Substance Use Topics    Alcohol use: Yes     Frequency: Monthly or less     Drinks per session: 1 or 2    Drug use: Never         G4 Problems (from 20 to present)     Problem Noted Resolved    Fetal ultrasound 2020 by CECE Patel No    Overview Signed 2020  3:05 PM by CECE Patel     20 (6w3d) EDC confirmed  20 (10w6d) WNL         Genetic screening 2020 by CECE Patel No    Overview Signed 2020  3:06 PM by CECE Patel     Desires Sequential Screen         Contraception management 2020 by CECE Patel No    Overview Signed 2020  3:06 PM by Ernestina Arndt, CECE     Need to discuss options         Previous c/s x3 9/18/2020 by CECE Arias No    Overview Signed 9/18/2020  3:16 PM by CECE Arias     Unable to obtain records from [facility]  Needs repeat c/s @39 weeks         Pyelonephritis during pregnancy 8/1/2020 by Ines Mejias MD No    Overview Signed 9/18/2020  3:08 PM by Ernestina Arndt, [address]     Hospitalized 8/1/20-8/4/20 (tx with IV Ceftriaxone x3 days, then PO Vantin x4 days)  Needs Macrobid suppression - started 8/9/2020         H/O gastric bypass 9/9/2019 by Magaly Dorado MD No    Overview Addendum 9/18/2020  3:28 PM by CECE Arias     If unable to tolerate glucose load with GTT's, use hgb A1C and random glucose level OR home BS monitoring QID x1 week  Needs serum vitamin levels checked (vit D & B12, folate, calcium, iron studies)  Needs consultation with bariatric dietician - ordered 9/18/20  Serial growth scans         Obesity in pregnancy 9/9/2019 by Magaly Dorado MD No    Overview Addendum 9/18/2020  3:28 PM by CECE Arias     Pre-pregnant BMI=30 28  Needs early GDM screen (cannot tolerate glucola load, will check hgb A1C & fasting glucose instead)  Serial growth scans

## 2020-09-18 NOTE — TELEPHONE ENCOUNTER
Spoke with patient and confirmed appointment with MFM  1 support person ( must be over age of 15) may accompany patient  Will you and your support person be able to wear a mask ,without a valve , during entire appointment? yes   To minimize your exposure in our waiting area,check in and rooming questions will be done via phone  When you arrive in the parking lot please call the following inside line # prior to entering office:    Aimee Rivera [phone] line: [phone] [facility] line:  [phone] line: [phone]  Rick Patten line:  [phone]  [place] line: [phone]    Have you or your support person traveled outside the state in the last 2 weeks?no   If yes, what state did you travel to? no     Do you or your support person have:  Fever or flu- like symptoms?no  Symptoms of upper respiratory infection like runny nose, sore throat or cough? no  Do you have new headache that you have not had in the past?no  Have you experienced any new shortness of breath recently?no  Do you have any new loss of taste or smell?no  Do you have any new diarrhea, nausea or vomiting?no  Have you recently been in contact with anyone who has been sick or diagnosed with COVID-19 infection?no  Have you been recommended to quarantine because of an exposure to a confirmed positive COVID19 person?no  You and your support person will have temperature screening upon arrival   Patient verbalized understanding of all instructions   -------------------------------------------------------------    Attempted to reach patient by phone and left voicemail to confirm appointment for MFM ultrasound  1 support person ( must be over the age of 15) may accompany you for your appointment  If you or your support person have traveled outside the state in the past 2 weeks, please call and notify our office today #[phone]    You and your support person must wear a mask ,covering nose and mouth,during your entire visit  You and your support person will have temperature screened upon arrival     To minimize your exposure in our waiting room, please call our office prior to entering the building  Check in and rooming questions will be done via phone  We will give you directions when to enter for your appointment  Inside office # provided:  [facility] line: [phone]  [facility] line:  [phone]  [facility] line:  [phone] line:  [phone]  Connie Lai line:  [phone]  [place] line:  [phone]    IF you are not feeling well- cough, fever, shortness of breath or any flu like symptoms, contact your primary care physician or [phone] [place]    Any questions with these instructions please call Maternal Fetal Medicine nurse line today @ # [phone]

## 2020-09-18 NOTE — LETTER
10/1/2020    To Lisa Manzo  : 1985      This letter is to advise you that your recent PAP SMEAR results were reviewed by me and are NORMAL    We will see you in 1 year for your annual exam     CECE Rodriguez

## 2020-09-19 LAB
25(OH)D3 SERPL-MCNC: 15.7 NG/ML (ref 30–100)
CALCIUM SERPL-MCNC: 8.7 MG/DL (ref 8.3–10.1)
FERRITIN SERPL-MCNC: 9 NG/ML (ref 8–388)
IRON SATN MFR SERPL: 28 %
IRON SERPL-MCNC: 142 UG/DL (ref 50–170)
TIBC SERPL-MCNC: 499 UG/DL (ref 250–450)

## 2020-09-22 LAB
DEPRECATED HGB OTHER BLD-IMP: 0 %
HGB A MFR BLD: 2.5 % (ref 1.8–3.2)
HGB A MFR BLD: 97.5 % (ref 96.4–98.8)
HGB C MFR BLD: 0 %
HGB F MFR BLD: 0 % (ref 0–2)
HGB FRACT BLD-IMP: NORMAL
HGB S BLD QL SOLY: NEGATIVE
HGB S MFR BLD: 0 %

## 2020-10-01 LAB
HPV HR 12 DNA CVX QL NAA+PROBE: NEGATIVE
HPV16 DNA CVX QL NAA+PROBE: NEGATIVE
HPV18 DNA CVX QL NAA+PROBE: NEGATIVE
LAB AP GYN PRIMARY INTERPRETATION: NORMAL
Lab: NORMAL

## 2020-10-02 LAB
C TRACH DNA SPEC QL NAA+PROBE: POSITIVE
N GONORRHOEA DNA SPEC QL NAA+PROBE: NEGATIVE

## 2020-10-05 ENCOUNTER — TELEPHONE (OUTPATIENT)
Dept: OBGYN CLINIC | Facility: CLINIC | Age: 35
End: 2020-10-05

## 2020-10-05 DIAGNOSIS — A74.9 CHLAMYDIA: Primary | ICD-10-CM

## 2020-10-05 RX ORDER — AZITHROMYCIN 250 MG/1
1000 TABLET, FILM COATED ORAL ONCE
Qty: 4 TABLET | Refills: 0 | Status: SHIPPED | OUTPATIENT
Start: 2020-10-05 | End: 2020-10-05

## 2020-10-14 ENCOUNTER — TELEPHONE (OUTPATIENT)
Dept: OBGYN CLINIC | Facility: CLINIC | Age: 35
End: 2020-10-14

## 2020-10-15 ENCOUNTER — ROUTINE PRENATAL (OUTPATIENT)
Dept: OBGYN CLINIC | Facility: CLINIC | Age: 35
End: 2020-10-15

## 2020-10-15 VITALS
DIASTOLIC BLOOD PRESSURE: 74 MMHG | SYSTOLIC BLOOD PRESSURE: 104 MMHG | BODY MASS INDEX: 31.95 KG/M2 | TEMPERATURE: 98 F | HEART RATE: 89 BPM | WEIGHT: 158.2 LBS

## 2020-10-15 DIAGNOSIS — Z3A.17 17 WEEKS GESTATION OF PREGNANCY: ICD-10-CM

## 2020-10-15 DIAGNOSIS — A74.9 CHLAMYDIA: ICD-10-CM

## 2020-10-15 DIAGNOSIS — O23.00 PYELONEPHRITIS DURING PREGNANCY: ICD-10-CM

## 2020-10-15 DIAGNOSIS — O09.92 HIGH-RISK PREGNANCY IN SECOND TRIMESTER: Primary | ICD-10-CM

## 2020-10-15 DIAGNOSIS — O09.522 MULTIGRAVIDA OF ADVANCED MATERNAL AGE IN SECOND TRIMESTER: ICD-10-CM

## 2020-10-15 PROCEDURE — 99213 OFFICE O/P EST LOW 20 MIN: CPT | Performed by: OBSTETRICS & GYNECOLOGY

## 2020-10-15 RX ORDER — NITROFURANTOIN 25; 75 MG/1; MG/1
100 CAPSULE ORAL DAILY
Qty: 30 CAPSULE | Refills: 8 | Status: SHIPPED | OUTPATIENT
Start: 2020-10-15 | End: 2021-01-27

## 2020-10-16 ENCOUNTER — LAB (OUTPATIENT)
Dept: LAB | Facility: HOSPITAL | Age: 35
End: 2020-10-16
Attending: NURSE PRACTITIONER
Payer: COMMERCIAL

## 2020-10-16 DIAGNOSIS — Z3A.13 13 WEEKS GESTATION OF PREGNANCY: ICD-10-CM

## 2020-10-16 DIAGNOSIS — O99.210 OBESITY IN PREGNANCY: ICD-10-CM

## 2020-10-16 DIAGNOSIS — Z98.84 H/O GASTRIC BYPASS: ICD-10-CM

## 2020-10-16 DIAGNOSIS — A74.9 CHLAMYDIA: ICD-10-CM

## 2020-10-16 LAB
EST. AVERAGE GLUCOSE BLD GHB EST-MCNC: 105 MG/DL
FOLATE SERPL-MCNC: >20 NG/ML (ref 3.1–17.5)
GLUCOSE 1H P 50 G GLC PO SERPL-MCNC: 115 MG/DL
GLUCOSE P FAST SERPL-MCNC: 114 MG/DL (ref 70–99)
HBA1C MFR BLD: 5.3 %
VIT B12 SERPL-MCNC: 1380 PG/ML (ref 100–900)

## 2020-10-16 PROCEDURE — 82947 ASSAY GLUCOSE BLOOD QUANT: CPT

## 2020-10-16 PROCEDURE — 36415 COLL VENOUS BLD VENIPUNCTURE: CPT

## 2020-10-16 PROCEDURE — 83036 HEMOGLOBIN GLYCOSYLATED A1C: CPT

## 2020-10-16 PROCEDURE — 82950 GLUCOSE TEST: CPT

## 2020-10-16 PROCEDURE — 82746 ASSAY OF FOLIC ACID SERUM: CPT

## 2020-10-16 PROCEDURE — 87491 CHLMYD TRACH DNA AMP PROBE: CPT

## 2020-10-16 PROCEDURE — 87591 N.GONORRHOEAE DNA AMP PROB: CPT

## 2020-10-16 PROCEDURE — 82607 VITAMIN B-12: CPT

## 2020-10-19 PROBLEM — O09.529 AMA (ADVANCED MATERNAL AGE) MULTIGRAVIDA 35+: Status: ACTIVE | Noted: 2020-10-15

## 2020-10-19 LAB
C TRACH DNA SPEC QL NAA+PROBE: POSITIVE
N GONORRHOEA DNA SPEC QL NAA+PROBE: NEGATIVE

## 2020-10-21 ENCOUNTER — TELEPHONE (OUTPATIENT)
Dept: OBGYN CLINIC | Facility: CLINIC | Age: 35
End: 2020-10-21

## 2020-11-06 ENCOUNTER — TELEPHONE (OUTPATIENT)
Dept: PERINATAL CARE | Facility: CLINIC | Age: 35
End: 2020-11-06

## 2020-11-09 ENCOUNTER — TRANSCRIBE ORDERS (OUTPATIENT)
Dept: LAB | Facility: CLINIC | Age: 35
End: 2020-11-09

## 2020-11-09 ENCOUNTER — APPOINTMENT (OUTPATIENT)
Dept: LAB | Facility: CLINIC | Age: 35
End: 2020-11-09
Payer: COMMERCIAL

## 2020-11-09 ENCOUNTER — ROUTINE PRENATAL (OUTPATIENT)
Dept: PERINATAL CARE | Facility: OTHER | Age: 35
End: 2020-11-09
Payer: COMMERCIAL

## 2020-11-09 VITALS
DIASTOLIC BLOOD PRESSURE: 73 MMHG | WEIGHT: 157.6 LBS | TEMPERATURE: 96.5 F | HEART RATE: 85 BPM | BODY MASS INDEX: 31.83 KG/M2 | SYSTOLIC BLOOD PRESSURE: 108 MMHG

## 2020-11-09 DIAGNOSIS — Z36.86 ENCOUNTER FOR ANTENATAL SCREENING FOR CERVICAL LENGTH: ICD-10-CM

## 2020-11-09 DIAGNOSIS — O34.219 PREVIOUS CESAREAN DELIVERY, ANTEPARTUM: ICD-10-CM

## 2020-11-09 DIAGNOSIS — O23.00 PYELONEPHRITIS DURING PREGNANCY: ICD-10-CM

## 2020-11-09 DIAGNOSIS — O09.522 SUPERVISION OF ELDERLY MULTIGRAVIDA IN SECOND TRIMESTER: Primary | ICD-10-CM

## 2020-11-09 DIAGNOSIS — Z3A.20 20 WEEKS GESTATION OF PREGNANCY: ICD-10-CM

## 2020-11-09 DIAGNOSIS — O09.92 HIGH-RISK PREGNANCY IN SECOND TRIMESTER: ICD-10-CM

## 2020-11-09 DIAGNOSIS — O09.522 MULTIGRAVIDA OF ADVANCED MATERNAL AGE IN SECOND TRIMESTER: Primary | ICD-10-CM

## 2020-11-09 PROCEDURE — 76811 OB US DETAILED SNGL FETUS: CPT | Performed by: OBSTETRICS & GYNECOLOGY

## 2020-11-09 PROCEDURE — 99241 PR OFFICE CONSULTATION NEW/ESTAB PATIENT 15 MIN: CPT | Performed by: OBSTETRICS & GYNECOLOGY

## 2020-11-09 PROCEDURE — 1036F TOBACCO NON-USER: CPT | Performed by: OBSTETRICS & GYNECOLOGY

## 2020-11-09 PROCEDURE — 76817 TRANSVAGINAL US OBSTETRIC: CPT | Performed by: OBSTETRICS & GYNECOLOGY

## 2020-11-09 PROCEDURE — 36415 COLL VENOUS BLD VENIPUNCTURE: CPT

## 2020-11-16 ENCOUNTER — ROUTINE PRENATAL (OUTPATIENT)
Dept: OBGYN CLINIC | Facility: CLINIC | Age: 35
End: 2020-11-16

## 2020-11-16 VITALS
HEART RATE: 86 BPM | BODY MASS INDEX: 32.76 KG/M2 | SYSTOLIC BLOOD PRESSURE: 108 MMHG | WEIGHT: 162.2 LBS | TEMPERATURE: 97.9 F | DIASTOLIC BLOOD PRESSURE: 73 MMHG

## 2020-11-16 DIAGNOSIS — O99.210 OBESITY IN PREGNANCY: ICD-10-CM

## 2020-11-16 DIAGNOSIS — Z3A.21 21 WEEKS GESTATION OF PREGNANCY: ICD-10-CM

## 2020-11-16 DIAGNOSIS — A74.9 CHLAMYDIA: ICD-10-CM

## 2020-11-16 DIAGNOSIS — O09.522 MULTIGRAVIDA OF ADVANCED MATERNAL AGE IN SECOND TRIMESTER: ICD-10-CM

## 2020-11-16 DIAGNOSIS — O34.219 PREVIOUS CESAREAN DELIVERY, ANTEPARTUM: ICD-10-CM

## 2020-11-16 DIAGNOSIS — Z98.84 H/O GASTRIC BYPASS: ICD-10-CM

## 2020-11-16 DIAGNOSIS — O23.00 PYELONEPHRITIS DURING PREGNANCY: ICD-10-CM

## 2020-11-16 DIAGNOSIS — Z34.92 PRENATAL CARE IN SECOND TRIMESTER: Primary | ICD-10-CM

## 2020-11-16 PROCEDURE — 87591 N.GONORRHOEAE DNA AMP PROB: CPT | Performed by: NURSE PRACTITIONER

## 2020-11-16 PROCEDURE — 99213 OFFICE O/P EST LOW 20 MIN: CPT | Performed by: NURSE PRACTITIONER

## 2020-11-16 PROCEDURE — 90471 IMMUNIZATION ADMIN: CPT

## 2020-11-16 PROCEDURE — 87491 CHLMYD TRACH DNA AMP PROBE: CPT | Performed by: NURSE PRACTITIONER

## 2020-11-16 PROCEDURE — 90686 IIV4 VACC NO PRSV 0.5 ML IM: CPT

## 2020-11-18 ENCOUNTER — TELEPHONE (OUTPATIENT)
Dept: PERINATAL CARE | Facility: CLINIC | Age: 35
End: 2020-11-18

## 2020-11-19 LAB
C TRACH DNA SPEC QL NAA+PROBE: NEGATIVE
N GONORRHOEA DNA SPEC QL NAA+PROBE: NEGATIVE

## 2020-12-03 ENCOUNTER — PATIENT OUTREACH (OUTPATIENT)
Dept: OBGYN CLINIC | Facility: CLINIC | Age: 35
End: 2020-12-03

## 2020-12-09 ENCOUNTER — TELEPHONE (OUTPATIENT)
Dept: OBGYN CLINIC | Facility: CLINIC | Age: 35
End: 2020-12-09

## 2020-12-09 PROBLEM — Z3A.25 25 WEEKS GESTATION OF PREGNANCY: Status: ACTIVE | Noted: 2020-12-09

## 2020-12-10 ENCOUNTER — ROUTINE PRENATAL (OUTPATIENT)
Dept: OBGYN CLINIC | Facility: CLINIC | Age: 35
End: 2020-12-10

## 2020-12-10 VITALS
HEART RATE: 85 BPM | WEIGHT: 165 LBS | BODY MASS INDEX: 33.33 KG/M2 | SYSTOLIC BLOOD PRESSURE: 117 MMHG | DIASTOLIC BLOOD PRESSURE: 73 MMHG

## 2020-12-10 DIAGNOSIS — O34.219 PREVIOUS CESAREAN DELIVERY, ANTEPARTUM: ICD-10-CM

## 2020-12-10 DIAGNOSIS — O09.522 MULTIGRAVIDA OF ADVANCED MATERNAL AGE IN SECOND TRIMESTER: ICD-10-CM

## 2020-12-10 DIAGNOSIS — O99.210 OBESITY IN PREGNANCY: ICD-10-CM

## 2020-12-10 DIAGNOSIS — Z98.84 H/O GASTRIC BYPASS: ICD-10-CM

## 2020-12-10 DIAGNOSIS — Z3A.25 25 WEEKS GESTATION OF PREGNANCY: Primary | ICD-10-CM

## 2020-12-10 DIAGNOSIS — O23.00 PYELONEPHRITIS DURING PREGNANCY: ICD-10-CM

## 2020-12-10 DIAGNOSIS — A74.9 CHLAMYDIA: ICD-10-CM

## 2020-12-10 PROCEDURE — 1036F TOBACCO NON-USER: CPT | Performed by: OBSTETRICS & GYNECOLOGY

## 2020-12-10 PROCEDURE — 99213 OFFICE O/P EST LOW 20 MIN: CPT | Performed by: OBSTETRICS & GYNECOLOGY

## 2020-12-31 ENCOUNTER — ROUTINE PRENATAL (OUTPATIENT)
Dept: OBGYN CLINIC | Facility: CLINIC | Age: 35
End: 2020-12-31

## 2020-12-31 VITALS
HEIGHT: 59 IN | DIASTOLIC BLOOD PRESSURE: 83 MMHG | BODY MASS INDEX: 33.87 KG/M2 | SYSTOLIC BLOOD PRESSURE: 133 MMHG | WEIGHT: 168 LBS | HEART RATE: 90 BPM

## 2020-12-31 DIAGNOSIS — O09.523 MULTIGRAVIDA OF ADVANCED MATERNAL AGE IN THIRD TRIMESTER: ICD-10-CM

## 2020-12-31 DIAGNOSIS — O99.210 OBESITY IN PREGNANCY: ICD-10-CM

## 2020-12-31 DIAGNOSIS — Z3A.28 28 WEEKS GESTATION OF PREGNANCY: ICD-10-CM

## 2020-12-31 DIAGNOSIS — Z34.93 PRENATAL CARE IN THIRD TRIMESTER: Primary | ICD-10-CM

## 2020-12-31 PROCEDURE — 90471 IMMUNIZATION ADMIN: CPT

## 2020-12-31 PROCEDURE — 90715 TDAP VACCINE 7 YRS/> IM: CPT

## 2020-12-31 PROCEDURE — 3008F BODY MASS INDEX DOCD: CPT | Performed by: OBSTETRICS & GYNECOLOGY

## 2020-12-31 PROCEDURE — 99213 OFFICE O/P EST LOW 20 MIN: CPT | Performed by: NURSE PRACTITIONER

## 2021-01-13 NOTE — PATIENT INSTRUCTIONS
Pregnancy at 31 to 34 Weeks   AMBULATORY CARE:   What changes are happening to your body: You may continue to have symptoms such as shortness of breath, heartburn, contractions, or swelling of your ankles and feet  You may be gaining about 1 pound a week now  Seek care immediately if:   · You develop a severe headache that does not go away  · You have new or increased vision changes, such as blurred or spotted vision  · You have new or increased swelling in your face or hands  · You have vaginal spotting or bleeding  · Your water broke or you feel warm water gushing or trickling from your vagina  Contact your healthcare provider if:   · You have more than 5 contractions in 1 hour  · You notice any changes in your baby's movements  · You have abdominal cramps, pressure, or tightening  · You have a change in vaginal discharge  · You have chills or a fever  · You have vaginal itching, burning, or pain  · You have yellow, green, white, or foul-smelling vaginal discharge  · You have pain or burning when you urinate, less urine than usual, or pink or bloody urine  · You have questions or concerns about your condition or care  How to care for yourself at this stage of your pregnancy:   · Eat a variety of healthy foods  Healthy foods include fruits, vegetables, whole-grain breads, low-fat dairy foods, beans, lean meats, and fish  Drink liquids as directed  Ask how much liquid to drink each day and which liquids are best for you  Limit caffeine to less than 200 milligrams each day  Limit your intake of fish to 2 servings each week  Choose fish low in mercury such as canned light tuna, shrimp, salmon, cod, or tilapia  Do not  eat fish high in mercury such as swordfish, tilefish, tamara mackerel, and shark  · Manage heartburn  by eating 4 or 5 small meals each day instead of large meals  Avoid spicy food  · Manage swelling  by lying down and putting your feet up  · Take prenatal vitamins as directed  Your need for certain vitamins and minerals, such as folic acid, increases during pregnancy  Prenatal vitamins provide some of the extra vitamins and minerals you need  Prenatal vitamins may also help to decrease the risk of certain birth defects  · Talk to your healthcare provider about exercise  Moderate exercise can help you stay fit  Your healthcare provider will help you plan an exercise program that is safe for you during pregnancy  · Do not smoke  Smoking increases your risk of a miscarriage and other health problems during your pregnancy  Smoking can cause your baby to be born too early or weigh less at birth  Ask your healthcare provider for information if you need help quitting  · Do not drink alcohol  Alcohol passes from your body to your baby through the placenta  It can affect your baby's brain development and cause fetal alcohol syndrome (FAS)  FAS is a group of conditions that causes mental, behavior, and growth problems  · Talk to your healthcare provider before you take any medicines  Many medicines may harm your baby if you take them when you are pregnant  Do not take any medicines, vitamins, herbs, or supplements without first talking to your healthcare provider  Never use illegal or street drugs (such as marijuana or cocaine) while you are pregnant  Safety tips during pregnancy:   · Avoid hot tubs and saunas  Do not use a hot tub or sauna while you are pregnant, especially during your first trimester  Hot tubs and saunas may raise your baby's temperature and increase the risk of birth defects  · Avoid toxoplasmosis  This is an infection caused by eating raw meat or being around infected cat feces  It can cause birth defects, miscarriages, and other problems  Wash your hands after you touch raw meat  Make sure any meat is well-cooked before you eat it  Avoid raw eggs and unpasteurized milk   Use gloves or ask someone else to clean your cat's litter box while you are pregnant  Changes that are happening with your baby:  By 34 weeks, your baby may weigh more than 5 pounds  Your baby will be about 12 ½ inches long from the top of the head to the rump (baby's bottom)  Your baby is gaining about ½ pound a week  Your baby's eyes open and close now  Your baby's kicks and movements are more forceful at this time  What you need to know about prenatal care: Your healthcare provider will check your blood pressure and weight  You may also need the following:  · A urine test  may also be done to check for sugar and protein  These can be signs of gestational diabetes or infection  Protein in your urine may also be a sign of preeclampsia  Preeclampsia is a condition that can develop during week 20 or later of your pregnancy  It causes high blood pressure, and it can cause problems with your kidneys and other organs  · A Tdap vaccine  may be recommended by your healthcare provider  · Fundal height  is a measurement of your uterus to check your baby's growth  This number is usually the same as the number of weeks that you have been pregnant  Your healthcare provider may also check your baby's position  · Your baby's heart rate  will be checked  © Copyright [address] Information is for End User's use only and may not be sold, redistributed or otherwise used for commercial purposes  All illustrations and images included in CareNotes® are the copyrighted property of A D A M , Inc  or [facility] Edvin Vernon   The above information is an  only  It is not intended as medical advice for individual conditions or treatments  Talk to your doctor, nurse or pharmacist before following any medical regimen to see if it is safe and effective for you

## 2021-01-13 NOTE — PROGRESS NOTES
Bala Sender is a 38 y o  [id number] at 30w0d     Chief complaint today: I have a hemorrhoid    ROS:   VB: denies   LOF: denies   CXN: denies   FM: present    Vitals:    21 1535   BP: 128/79   Pulse: 84        bpm    Hemorrhoid   - Preparation H cream and Anusol suppository sent to pharmacy   - External hemorrhoid, hard to palpation, slight bleeding upon palpation   - Will f/u next week   UTI   - Finish Macrobid course, 3 more days  Delivery consent obtained   - Reviewed , OVD, and  section, risks, benefits and alternatives, reasons to proceed with each method, all of the patient's questions were answered to her satisfaction   - In the event of an emergency the patient is amenable to blood transfusion  - Check in card given  Pt to call to setup appt  Vaccinations  - Tdap and flu shot previously adiministered  RTC in 2 weeks  - Reviewed  labor precautions as well as ROM, dec FM, vaginal bleeding, and true vs false labor, pt to call with any questions and come to hospital with any concerns    COVID 19 precautions were discussed with patient at length, reviewed symptoms, hygiene, social distancing, patient to call office  with questions/concerns    D/w Dr Travis Baptiste      Future Appointments   Date Time Provider Department Center   2021  3:30 PM CECE [facility]   2/3/2021  3:15 PM  US [facility] [address] [place] [address]   2021  3:30 PM OBGYN PGY-3 RESIDENT [address]   2021  3:30 PM CECE [facility]   3/3/2021  3:00 PM Aminah Velasquez MD [address]   3/11/2021  3:30 PM CECE [facility]   3/18/2021  3:15 PM OBGYN PGY-3 RESIDENT  [facility]is L[facility]  PGY-1 OB/GYN   2021 3:45 PM

## 2021-01-14 ENCOUNTER — ROUTINE PRENATAL (OUTPATIENT)
Dept: OBGYN CLINIC | Facility: CLINIC | Age: 36
End: 2021-01-14

## 2021-01-14 VITALS
WEIGHT: 170 LBS | SYSTOLIC BLOOD PRESSURE: 128 MMHG | BODY MASS INDEX: 34.34 KG/M2 | DIASTOLIC BLOOD PRESSURE: 79 MMHG | HEART RATE: 84 BPM

## 2021-01-14 DIAGNOSIS — Z3A.28 28 WEEKS GESTATION OF PREGNANCY: Primary | ICD-10-CM

## 2021-01-14 DIAGNOSIS — K64.9 HEMORRHOIDS, UNSPECIFIED HEMORRHOID TYPE: ICD-10-CM

## 2021-01-14 PROCEDURE — 99214 OFFICE O/P EST MOD 30 MIN: CPT | Performed by: OBSTETRICS & GYNECOLOGY

## 2021-01-14 RX ORDER — HYDROCORTISONE ACETATE 25 MG/1
25 SUPPOSITORY RECTAL 2 TIMES DAILY
Qty: 12 SUPPOSITORY | Refills: 0 | Status: SHIPPED | OUTPATIENT
Start: 2021-01-14 | End: 2021-01-27

## 2021-01-14 RX ORDER — DIAPER,BRIEF,INFANT-TODD,DISP
EACH MISCELLANEOUS 4 TIMES DAILY PRN
Qty: 30 G | Refills: 0 | Status: SHIPPED | OUTPATIENT
Start: 2021-01-14 | End: 2021-03-31

## 2021-01-21 ENCOUNTER — LAB (OUTPATIENT)
Dept: LAB | Facility: HOSPITAL | Age: 36
End: 2021-01-21
Payer: COMMERCIAL

## 2021-01-21 ENCOUNTER — TELEPHONE (OUTPATIENT)
Dept: OBGYN CLINIC | Facility: CLINIC | Age: 36
End: 2021-01-21

## 2021-01-21 ENCOUNTER — ROUTINE PRENATAL (OUTPATIENT)
Dept: OBGYN CLINIC | Facility: CLINIC | Age: 36
End: 2021-01-21

## 2021-01-21 VITALS
HEART RATE: 91 BPM | SYSTOLIC BLOOD PRESSURE: 124 MMHG | WEIGHT: 171 LBS | DIASTOLIC BLOOD PRESSURE: 71 MMHG | BODY MASS INDEX: 34.54 KG/M2

## 2021-01-21 DIAGNOSIS — Z3A.31 31 WEEKS GESTATION OF PREGNANCY: Primary | ICD-10-CM

## 2021-01-21 DIAGNOSIS — O99.810 ABNORMAL GLUCOSE AFFECTING PREGNANCY: Primary | ICD-10-CM

## 2021-01-21 DIAGNOSIS — Z3A.28 28 WEEKS GESTATION OF PREGNANCY: ICD-10-CM

## 2021-01-21 DIAGNOSIS — Z3A.25 25 WEEKS GESTATION OF PREGNANCY: ICD-10-CM

## 2021-01-21 DIAGNOSIS — O23.00 PYELONEPHRITIS DURING PREGNANCY: ICD-10-CM

## 2021-01-21 DIAGNOSIS — Z34.93 PRENATAL CARE IN THIRD TRIMESTER: ICD-10-CM

## 2021-01-21 LAB
BASOPHILS # BLD AUTO: 0 THOUSANDS/ΜL (ref 0–0.1)
BASOPHILS NFR BLD AUTO: 1 % (ref 0–1)
EOSINOPHIL # BLD AUTO: 0.1 THOUSAND/ΜL (ref 0–0.4)
EOSINOPHIL NFR BLD AUTO: 1 % (ref 0–6)
ERYTHROCYTE [DISTWIDTH] IN BLOOD BY AUTOMATED COUNT: 14.4 %
GLUCOSE 1H P 50 G GLC PO SERPL-MCNC: 152 MG/DL
HCT VFR BLD AUTO: 34.2 % (ref 36–46)
HGB BLD-MCNC: 11 G/DL (ref 12–16)
LYMPHOCYTES # BLD AUTO: 2.1 THOUSANDS/ΜL (ref 0.5–4)
LYMPHOCYTES NFR BLD AUTO: 25 % (ref 25–45)
MCH RBC QN AUTO: 30.3 PG (ref 26–34)
MCHC RBC AUTO-ENTMCNC: 32 G/DL (ref 31–36)
MCV RBC AUTO: 95 FL (ref 80–100)
MONOCYTES # BLD AUTO: 0.5 THOUSAND/ΜL (ref 0.2–0.9)
MONOCYTES NFR BLD AUTO: 6 % (ref 1–10)
NEUTROPHILS # BLD AUTO: 5.8 THOUSANDS/ΜL (ref 1.8–7.8)
NEUTS SEG NFR BLD AUTO: 68 % (ref 45–65)
PLATELET # BLD AUTO: 226 THOUSANDS/UL (ref 150–450)
PMV BLD AUTO: 9.8 FL (ref 8.9–12.7)
RBC # BLD AUTO: 3.62 MILLION/UL (ref 4–5.2)
WBC # BLD AUTO: 8.4 THOUSAND/UL (ref 4.5–11)

## 2021-01-21 PROCEDURE — 82950 GLUCOSE TEST: CPT

## 2021-01-21 PROCEDURE — 1036F TOBACCO NON-USER: CPT | Performed by: OBSTETRICS & GYNECOLOGY

## 2021-01-21 PROCEDURE — 99214 OFFICE O/P EST MOD 30 MIN: CPT | Performed by: OBSTETRICS & GYNECOLOGY

## 2021-01-21 PROCEDURE — 36415 COLL VENOUS BLD VENIPUNCTURE: CPT

## 2021-01-21 PROCEDURE — 85025 COMPLETE CBC W/AUTO DIFF WBC: CPT

## 2021-01-21 PROCEDURE — 86592 SYPHILIS TEST NON-TREP QUAL: CPT

## 2021-01-21 RX ORDER — NITROFURANTOIN 25; 75 MG/1; MG/1
100 CAPSULE ORAL DAILY
Qty: 60 CAPSULE | Refills: 0 | Status: SHIPPED | OUTPATIENT
Start: 2021-01-21 | End: 2021-03-20 | Stop reason: HOSPADM

## 2021-01-21 NOTE — PROGRESS NOTES
Saniya Kearney is a 38 y o  [id number] at 27w3d     Chief complaint today: here for follow up    ROS:   VB: denies   LOF: denies   CXN: denies   FM: present    Vitals:    21 1119   BP: 124/71   Pulse: 91        bpm, baby girl      COVID 19 precautions were discussed with patient at length, reviewed symptoms, hygiene, social distancing, patient to call office  with questions/concerns    Hemorrhoid              - Using preparation H cream and Anusol suppository               - Much improved from last week, per patient   Hx pyelonephritis   - Pt to take daily suppression with Jana  [place] 6 sent to pharmacy  Vaccinations  - Tdap and flu shot previously administered  RTC in 2 weeks  - Reviewed  labor precautions as well as ROM, dec FM, vaginal bleeding, and true vs false labor, pt to call with any questions and come to hospital with any concerns    D/w Dr Linden Prabhakar      Future Appointments   Date Time Provider Department Center   2021  3:30 PM CECE [facility]   2/3/2021  3:15 PM  US 3 [facility] [address] [place] [address]   2021  3:30 PM OBGYN PGY-3 RESIDENT [address]   2021  3:30 PM CECE Hendricks [facility]   3/3/2021  3:00 PM David Lim MD [address]   3/11/2021  3:30 PM CECE Hendricks [facility]   3/18/2021  3:15 PM OBGYN PGY-3 RESIDENT MARI Coko  PGY-1 OB/GYN   2021 11:31 AM

## 2021-01-22 ENCOUNTER — LAB (OUTPATIENT)
Dept: LAB | Facility: HOSPITAL | Age: 36
End: 2021-01-22
Attending: NURSE PRACTITIONER
Payer: COMMERCIAL

## 2021-01-22 DIAGNOSIS — O99.810 ABNORMAL GLUCOSE AFFECTING PREGNANCY: ICD-10-CM

## 2021-01-22 LAB
GLUCOSE 1H P 100 G GLC PO SERPL-MCNC: 136 MG/DL (ref 40–180)
GLUCOSE 2H P 100 G GLC PO SERPL-MCNC: 105 MG/DL (ref 40–155)
GLUCOSE 3H P 100 G GLC PO SERPL-MCNC: 54 MG/DL (ref 40–140)
GLUCOSE P FAST SERPL-MCNC: 73 MG/DL (ref 70–99)
RPR SER QL: NORMAL

## 2021-01-22 PROCEDURE — 82951 GLUCOSE TOLERANCE TEST (GTT): CPT

## 2021-01-22 PROCEDURE — 82952 GTT-ADDED SAMPLES: CPT

## 2021-01-22 PROCEDURE — 36415 COLL VENOUS BLD VENIPUNCTURE: CPT

## 2021-01-27 ENCOUNTER — ROUTINE PRENATAL (OUTPATIENT)
Dept: OBGYN CLINIC | Facility: CLINIC | Age: 36
End: 2021-01-27

## 2021-01-27 ENCOUNTER — TELEPHONE (OUTPATIENT)
Dept: OBGYN CLINIC | Facility: CLINIC | Age: 36
End: 2021-01-27

## 2021-01-27 VITALS
DIASTOLIC BLOOD PRESSURE: 77 MMHG | BODY MASS INDEX: 34.76 KG/M2 | HEART RATE: 93 BPM | SYSTOLIC BLOOD PRESSURE: 123 MMHG | HEIGHT: 59 IN | WEIGHT: 172.4 LBS

## 2021-01-27 DIAGNOSIS — N30.00 ACUTE CYSTITIS WITHOUT HEMATURIA: ICD-10-CM

## 2021-01-27 DIAGNOSIS — Z34.93 PRENATAL CARE IN THIRD TRIMESTER: Primary | ICD-10-CM

## 2021-01-27 DIAGNOSIS — O99.210 OBESITY IN PREGNANCY: ICD-10-CM

## 2021-01-27 DIAGNOSIS — Z3A.32 32 WEEKS GESTATION OF PREGNANCY: ICD-10-CM

## 2021-01-27 DIAGNOSIS — O09.523 MULTIGRAVIDA OF ADVANCED MATERNAL AGE IN THIRD TRIMESTER: ICD-10-CM

## 2021-01-27 LAB
SL AMB  POCT GLUCOSE, UA: NEGATIVE
SL AMB LEUKOCYTE ESTERASE,UA: ABNORMAL
SL AMB POCT BILIRUBIN,UA: NEGATIVE
SL AMB POCT BLOOD,UA: NEGATIVE
SL AMB POCT CLARITY,UA: ABNORMAL
SL AMB POCT COLOR,UA: YELLOW
SL AMB POCT KETONES,UA: ABNORMAL
SL AMB POCT NITRITE,UA: POSITIVE
SL AMB POCT PH,UA: 75
SL AMB POCT SPECIFIC GRAVITY,UA: 1.01
SL AMB POCT URINE PROTEIN: ABNORMAL
SL AMB POCT UROBILINOGEN: NEGATIVE

## 2021-01-27 PROCEDURE — 87186 SC STD MICRODIL/AGAR DIL: CPT | Performed by: NURSE PRACTITIONER

## 2021-01-27 PROCEDURE — 99213 OFFICE O/P EST LOW 20 MIN: CPT | Performed by: NURSE PRACTITIONER

## 2021-01-27 PROCEDURE — 87077 CULTURE AEROBIC IDENTIFY: CPT | Performed by: NURSE PRACTITIONER

## 2021-01-27 PROCEDURE — 3008F BODY MASS INDEX DOCD: CPT | Performed by: OBSTETRICS & GYNECOLOGY

## 2021-01-27 PROCEDURE — 81002 URINALYSIS NONAUTO W/O SCOPE: CPT | Performed by: NURSE PRACTITIONER

## 2021-01-27 PROCEDURE — 87086 URINE CULTURE/COLONY COUNT: CPT | Performed by: NURSE PRACTITIONER

## 2021-01-27 RX ORDER — CEPHALEXIN 500 MG/1
500 CAPSULE ORAL EVERY 12 HOURS SCHEDULED
Qty: 14 CAPSULE | Refills: 0 | Status: SHIPPED | OUTPATIENT
Start: 2021-01-27 | End: 2021-02-03

## 2021-01-27 RX ORDER — FLUCONAZOLE 150 MG/1
150 TABLET ORAL ONCE
Qty: 1 TABLET | Refills: 0 | Status: SHIPPED | OUTPATIENT
Start: 2021-01-27 | End: 2021-01-27

## 2021-01-27 RX ORDER — MELATONIN
1000 DAILY
Qty: 30 TABLET | Refills: 5 | Status: SHIPPED | OUTPATIENT
Start: 2021-01-27 | End: 2021-03-20 | Stop reason: HOSPADM

## 2021-01-27 NOTE — PROGRESS NOTES
Vince Cook presents today for routine OB visit at 32w0d  Blood Pressure: 123/77  Wt=78 2 kg (172 lb 6 4 oz); Body mass index is 34 82 kg/m² ; TWG=10 2 kg (22 lb 6 4 oz)  Fetal Heart Rate: 145; Fundal Height (cm): 31 cm  Abdomen: gravid, soft, non-tender  She reports started last night with low pelvic pain - mostly over suprapubic area off/on  States pain is sharp and lasts only a few seconds each time, but is occurring several times an hour  Reports not associated with uterine tightening  Denies dysuria  Urine dipstick notes + nitrites  Urine sent for culture  Given Rx Keflex BID x7 days  Advised stop Macrobid while taking Keflex and then resume Macrobid suppression after Keflex completed  Also given Rx Diflucan for candidiasis prophylaxis  Denies uterine contractions  Denies vaginal bleeding or leaking of fluid  Reports adequate fetal movement of at least 10 movements in 2 hours once daily  Scheduled for ultrasound 2/3/21  Reviewed premature labor precautions and fetal kick counts  Advised to continue medications and return in 2 weeks  Current Outpatient Medications   Medication Instructions    cephalexin (KEFLEX) 500 mg, Oral, Every 12 hours scheduled    cholecalciferol (VITAMIN D3) 1,000 Units, Oral, Daily    fluconazole (DIFLUCAN) 150 mg, Oral, Once    hydrocortisone 1 % cream Topical, 4 times daily PRN    nitrofurantoin (MACROBID) 100 mg, Oral, Daily    Prenatal Vit-Fe Fumarate-FA (Prenatal Vitamin) 27-0 8 MG TABS 1 tablet, Oral, Daily       Laboratory workup: initial OB labs (done 9/17/20); 28 week labs (done 1/21/21)    Genetic Screening: NIPT negative, did not do MSAFP    Vaccinations: influenza (given 11/16/20);  Tdap (given 12/31/20)    Postpartum contraception:  Desires surgical sterilization (Sharita Farias signed 12/31/20)    Fetal Ultrasounds:   8/1/20 (6w3d) EDC confirmed  9/1/20 (10w6d) WNL  11/9/20 (20w5d) no previa, herminia WNL & complete        G4 Problems (from 09/14/20 to present)     Problem Noted Resolved    Abnormal glucola 1/21/2021 by CECE Hendricks No    Overview Addendum 1/22/2021  2:43 PM by CECE Hendricks     Needs 3h GTT - done WNL         AMA 10/15/2020 by Mylene Stafford MD No    Overview Signed 11/16/2020  3:13 PM by CECE Hendricks     Needs genetic counseling - done 11/9/20  NIPT negative         Chlamydia 10/5/2020 by CECE Hendricks No    Overview Addendum 11/20/2020  9:27 AM by CECE Hendricks     Culture + 9/18/20  Needs tx - given 10/5/20  Needs ELVIRA - done 11/16/20 negative         Previous c/s x3 9/18/2020 by CECE Hendricks No    Overview Signed 9/18/2020  3:16 PM by CECE Hendricks     Unable to obtain records from [facility]  Needs repeat c/s @39 weeks         Pyelonephritis during pregnancy 8/1/2020 by Sara Jimenez MD No    Overview Addendum 1/27/2021 11:20 AM by Laura cAharya, [address]     Hospitalized 8/1/20-8/4/20 (tx with IV Ceftriaxone x3 days, then PO Vantin x4 days)  Needs Macrobid suppression - started 8/9/2020  UTI suspected 1/27/21 - culture sent, given Keflex x7 days, advised resume Macrobid suppression after Keflex completion         H/O gastric bypass 9/9/2019 by Andressa Castillo MD No    Overview Addendum 10/19/2020  8:18 AM by CECE Hendricks     Needs serum vitamin levels checked (vit D & B12, folate, calcium, iron studies) - done  Needs consultation with bariatric dietician - did not go, declines  Needs vitamin D supplementation - ordered 1/27/21  Serial growth scans         Obesity in pregnancy 9/9/2019 by Andressa Castillo MD No    Overview Addendum 10/19/2020  8:17 AM by CECE Hendricks     Pre-pregnant BMI=30 28  Needs early GDM screen - done WNL  Serial growth scans

## 2021-01-27 NOTE — PATIENT INSTRUCTIONS
El Tercer Trimestre  (28-42 semanas)   BARRIGA BEBÉ   ·        barriga bebé chupa barriga dedo ahora! ·        barriga bebé puede oír voces y responder al tacto    habla con Olga Hamman!!   ·        el cerebro de barriga bebé crece y se desarrolla más en los últimos 2 meses de embarazo   ·        Barbie Halo y [place] del bebé son Stephanie Heck y flexibles para que quepan por el canal del parto   ·        los movimientos del bebé cambian hacia el final del embarazo porque hay menos espacio para patear y estiramientos en tu vientre   ·        los pulmones del bebé no están completamente desarrollados y totalmente preparados para respirar por baljinder propios hasta el último 3-4 semanas antes de barriga fecha de vencimiento     TU CUERPO   ·        barriga vientre está creciendo mucho ahora   ·        será más difícil dormir jonnie de noche o ser tan activos pearl eres generalmente   ·        puede sudar más de lo habitual   ·        serás más desequilibrado    tenga cuidado de no caer! ·        Usted puede desarrollar hemorroides (qué pueden ser dolorosas y hacen difícil sentarse)   ·        los dos últimos meses del embarazo puede ser muy incómodos con corey de [place], corey de Barbie Halo y ardor de estómago   ·        Puedes empezar a tener contracciones    mientras son irregulares y Yolie de 5 por hora, esto es mehnaz parte normal de barriga cuerpo a punto de tener un bebé   ·        el mili uterino puede empezar a dilatar y abrir [place]    para estar listo para el parto   ·        Usted puede encontrarse que necesitan hacer orinar muy a menudo    porque el bebé está presionando mucho la vejiga   ·        Usted puede quedarse corta de respiracion más rápido de lo mykel          CUENTAS DEL RETROCESO FETAL    En el tercer trimestre (después de 28 semanas de gestación) deben realizar patada fetal cuentas cada día  Barriga bebé debe moverse al menos 10 veces en 2 horas scar un tiempo [place], mehnaz vez al día  Elegir el atime del día cuando el bebé es [place]   Trate de hacerlo a la misma hora cada día  Entrar en mehnaz posición cómoda y luego escribir el tiempo que tu bebé se mueve gloria  Cuenta cada movimiento hasta que el bebé se mueve 10 veces  Estos movimientos incluyen patadas, puñetazos, codazos, revolotea o rollos  [place] puede tardar entre 5 minutos a 2 horas  Anote el tiempo que sientes movimiento 10 del bebé  Si ha pasado 2 horas y tu bebé no se ha movido al menos 10 veces, usted debe llamar a la oficina de inmediato  Juni [address] a [phone]:  Taina Mediate que llamar inmediatamente si tengo incluso mehnaz pequeña cantidad de LIQUIDO de mi vagina, con o sin contracciones  * Tengo que llamar si estoy SANGRADO de mi vagina  * Tengo que llamar si tengo COLICOS que continúa después de beber 2 ó 3 vasos de agua y [place] en mi lado scar mehnaz hora y que se siente pearl que estoy teniendo un período  * Tengo que llamar si siento CONTRACCIONES más de 4 veces en mehnaz hora quando siento que mi randall se mantiene dura después de que trato de beber 2-3 vasos del agua y [place] en mi lado scar mehnaz hora  * Tengo que llamar si amado un cambio de mi FLUJO vaginal    * Tengo que llamar si siento la [place] P[place] que siente que el bebé aprieta en mi vagina y dura más de [place]  * Tengo que llamar si tengo DOLOR BAJO DE LA ESPALDA que es nueva y está cerca de mi cóccix  Puede entrar y salir varias veces scar mehnaz hora o quedarse allí constantemente  LA PRE-ECLAMPSIA    ¿Qué es? La preeclampsia es mehnaz enfermedad grave que puede ocurrir scar el embarazo se relaciona con la presión arterial maría  Le puede pasar a cualquier anuj  ¿Por qué Yes importarme? [address] preeclampsia tienen riesgos graves pueden incluir convulsiones, trazo, daños en los [place], nacimiento prematuro de barriga bebé  En los [facility], puede causar la muerte de la madre y barriga bebé  ¿Qué madeleine pagar Ignacio Courtney?  Signos y síntomas de la preeclampsia pueden incluir:   * Inflamación severa mary de la miguelito o las   * Dolor de radha todavía presente después de yahaira tomado Tylenol   * Matty manchas o cambios en [place]   * Dolor en la parte superior del abdomen o hombro   * [place] náuseas y vómitos (en la segunda mitad del embarazo)   * Aumento de peso repentino   * Dificultad para respirar     ¿Qué madeleine hacer? Si usted experimenta alguno de los síntomas de la preeclampsia, comuníquese con barriga proveedor de [facility]  Encontrar la preeclampsia temprana es importante para usted y barriga bebé  Katia ching [phone]  LACTANCIA MATERNA     BENEFICIOS PARA LOS BEBÉS   ·       sistemas inmunológicos más alley (menos alergias, eczema, asma y cáncer infantil)   ·       menos diarrea y estreñimiento u otras enfermedades GI   ·       menos resfriados e infecciones del oído   ·       mejor visión y dientes (menos cavidades)   ·       mejora el IQ   ·       menores tasas de diabetes y obesidad en la infancia     BENEFICIOS PARA LAS MADRES   ·        promueve la pérdida de peso más rápida después del parto   ·        cecy riesgo para la depresión postparto   ·        cecy riesgo para los cánceres Theo, útero y ovario   ·        cecy riesgo para la osteoporosis en desarrollo con la edad   ·        siempre es más fácil que fórmula - correcto, limpio, y la temperatura adecuada   ·        menos costosa que la fórmula es gratis!!!     CLAVES PARA GRZEGORZ LACTANCIA EXITOSA   ·        mantener bebé piel a piel hasta después de la primera alimentación evento   ·        tener a bebé en barriga cuarto con usted scar barriga estadía en el hospital después del parto   ·        evitar cualquier botella de alimentación (a menos que sea médicamente necesario)   ·        limitar el uso de chupones y pañales   ·        Pida ayuda si usted está teniendo problemas    consultores de lactancia (que se especializan en la lactancia) están disponibles para ayudarle a   · mehnaz dieta saludable para mamá    comiendo mehnaz variedad de alimentos y raciones con moderación     COSAS QUE DEBES SABER SOBRE LA LACTANCIA   ·        mayoría de los medicamentos se considera compatible con la lactancia materna por [address] [place] [address] Sruthi Morrisbrianna consultarlo con barriga médico o en lactancia antes de skye un medicamento nuevo    para estar seguro es seguro   ·        alcohol (cerveza, vino, licor) puede transmitirse de la madre al bebé a través de la [place]    un ocasional, social bebida es considerado aceptable por [address]    más que eso deben evitarse   ·        la lactancia materna es NO es un método para evitar embarazo   ·        nicotina (cigarrillos) puede pasar de la madre al bebé a través de la [place]    sin embargo, para las [employer], es aún más saludable para amamantar que use la fórmula   ·        cafeína debería limitarse a 1-3 tazas por día    incluye café, refrescos, bebidas energéticas           MASAJE EN LA LEIGH PERINEAL O VAGINAL    Que puedo hacer ahora para reducir las probabilidades de desgarro scar el parto? Masaje alrededor del orificio de la vagina realizado por usted misma (o por barriga davis)  El masaje en esta leigh, ya sea antes del parto o scar la segunda etapa del parto, [place] reducir la probabilidad de desgarro perineal scar el parto  Asimismo, el uso de compresas tibias en el perineo scar la etapa expulsiva del parto puede reducir la pravedad del desparro  [place] sucedera scar la etapa expulsiva del parto  En casa tambien puede reducir las probabilidades de sufrir lesiones durnate el parto de con masajes en la leigh perineal     [place]? Todas las mujeres embarazadas a partir de, [place], las 34 semanas de [facility]  Cuando? Usted o barriga davis deben hacer masajes en la leigh vaginal scar 5 a 10 minutos de 1 a 4 veces por semana  Tigist?   Use [employer] de agnorma y [facility] Jamal, werner u beck con 1 o 2 dedos (mauro la comodidad)  Inserte los dedos en la vagina entre 3 y 5cm  Aplique presion general hacia abajo y [employer] costados [place] 5 a [address] 3 y las 9 horas, de 1 a 4 veces por semana  SENALES [place] EL EMBARAZO  Llame a nuestra oficina al [phone] para cualquiera de los siguientes:    1  Sangrado vaginal  2  Dolor abdominal devon que no desaparece  3  Fiebre (más de 100 4 y no se eren con Tylenol)  4  Vómitos persistentes que sorto más de 24 horas  5  dolor de pecho  6  Dolor o ardor al orinar  7  Dolor de radha severo que no se resuelve con Tylenol  8  Visión borrosa o natanael puntos en barriga visión  9  Hinchazón repentina de barriga miguelito o mary  10  Enrojecimiento, hinchazón o dolor en mehnaz pierna  11  Un aumento de peso repentino en pocos días  12  Contar los movimientos fetales del bebé  (después de 28 semanas o el sexto mes de embarazo)  15  Mehnaz pérdida de líquido acuoso de la vagina: puede ser un chorro, un goteo o mehnaz humedad continua  14   Después de 20 semanas de embarazo, calambres rítmicos (más de 4 por hora) o menstruales pearl dolor Mauro Kraus / Levi Lundberg

## 2021-01-29 LAB
BACTERIA UR CULT: ABNORMAL
BACTERIA UR CULT: ABNORMAL

## 2021-02-11 ENCOUNTER — ROUTINE PRENATAL (OUTPATIENT)
Dept: OBGYN CLINIC | Facility: CLINIC | Age: 36
End: 2021-02-11

## 2021-02-11 VITALS
BODY MASS INDEX: 35.51 KG/M2 | HEART RATE: 91 BPM | WEIGHT: 175.8 LBS | DIASTOLIC BLOOD PRESSURE: 77 MMHG | SYSTOLIC BLOOD PRESSURE: 116 MMHG

## 2021-02-11 DIAGNOSIS — Z30.2 REQUEST FOR STERILIZATION: ICD-10-CM

## 2021-02-11 DIAGNOSIS — Z98.84 H/O GASTRIC BYPASS: Primary | ICD-10-CM

## 2021-02-11 DIAGNOSIS — O23.00 PYELONEPHRITIS DURING PREGNANCY: ICD-10-CM

## 2021-02-11 DIAGNOSIS — O99.210 OBESITY IN PREGNANCY: ICD-10-CM

## 2021-02-11 DIAGNOSIS — Z3A.34 34 WEEKS GESTATION OF PREGNANCY: ICD-10-CM

## 2021-02-11 DIAGNOSIS — A74.9 CHLAMYDIA: ICD-10-CM

## 2021-02-11 DIAGNOSIS — O09.523 MULTIGRAVIDA OF ADVANCED MATERNAL AGE IN THIRD TRIMESTER: ICD-10-CM

## 2021-02-11 DIAGNOSIS — O34.219 PREVIOUS CESAREAN DELIVERY, ANTEPARTUM: ICD-10-CM

## 2021-02-11 PROCEDURE — PNV: Performed by: OBSTETRICS & GYNECOLOGY

## 2021-02-11 NOTE — PATIENT INSTRUCTIONS
Primeros signos del parto   LO QUE USTED DEBE SABER:   Los signos de parto temprano son cambios en barriga cuerpo que permiten que barriga bebé pase por barriga canal del parto  DESPUÉS DE SER DADO DE JIMMY:   Signos y síntomas del parto temprano:   · El aligeramiento  ocurre cuando barriga bebé baja por dentro de barriga pelvis  Puede ser que usted sienta un aumento de presión en barriga pelvis  Puede que esto suceda de unas cuantas semanas a unas horas antes que [employer]  · Las contracciones  son calambres y endurecimiento que ocurren en barriga útero para ayudar a  al bebé a través de barriga canal de parto  Las contracciones ocurren con regularidad y con Javed Quiet  Cada mehnaz dura aproximadamente de 30 a 79 segundos y se hacen más alley hasta que usted de a dennis a barriga bebé  Las contracciones no se alivian con el movimiento  El dolor empieza en la parte inferior de barriga espalda y se [place] barriga abdomen  · El borramiento del útero (adelgazamiento)  ocurre cuando barriga útero se suaviza y adelgaza para que pueda abrirse para el bebé con facilidad  Barriga médico de cabecera u obstetra le examinará el mili del útero para natanael si hay borramiento  · La dilatación  es el ensanchamiento del mili del [facility] para la salida del bebé  Barriga médico de cabecera u obstetra le examinará el mili del útero para natanael si hay dilatación  Barriga mili del útero estará completamente abierto y listo para el alumbramiento cuando tenga 10 centímetros de dilatación  · El aumento de muestras  de la vagina puede suceder  Puede que las muestras ronnie rosadas, claras o un poquito sangrientas  Estas muestras pueden también llamarse muestras de [place]  Las muestras de lelia consisten de un tapón [employer] se forma y Derinda Booze barriga mili del útero scar el Rafaela Jake  · La ruptura de membranas  es mehnaz liberación repentina de líquido ginger de la vagina  También se le conoce pearl ruptura jam   Puede que barriga médico de cabecera u Paulino necesite romperle la adi si no se rompe por sí lonnie  Parto falso:  Puede ser que usted tenga signos de [address], los cuales [place] se conocen pearl contracciones de [place]  El [address] es común y puede que suceda varias semanas o días antes de nj parto actual  Las contracciones no son regulares, y no suceden a corto tiempo la mehnaz de la [place]  El dolor es generalmente leve, no empeora y se siente solo en el frente  Puede que las contracciones [place] sucedan al final del día y paren después que usted cambie de posición, camine o descanse  Comuníquese con nj médico de cabecera u obstetra si:   · Usted tiene [employer] parte inferior de la espalda o abdomen  · Usted nota nj tapón mucoso o tiene muestras  · Usted tiene preguntas o inquietudes acerca de nj condición o cuidado  Busque ayuda inmediatamente o llame al 911 si:   · Usted tiene contracciones regulares y dolorosas con menos de 5 minutos de diferencia la mehnaz de la otra y que sorto de 30 a 79 segundos cada mehnaz  · Usted tiene mucho sangrado vaginal      · Usted tiene un goteo nikole o un desborde repentino de líquido ginger [employer] de la vagina  · Usted nota mehnaz disminución repentina en los movimientos de nj bebé  © 2014 [address] is for End User's use only and may not be sold, redistributed or otherwise used for commercial purposes  All illustrations and images included in CareNotes® are the copyrighted property of A D A M , Inc  or Mando Hein  Esta información es sólo para uso en educación  Nj intención no es darle un consejo médico sobre enfermedades o tratamientos  Colsulte con nj Jarrett Tal farmacéutico antes de seguir cualquier régimen médico para saber si es seguro y efectivo para usted  Conteo de patadas en el embarazo   LO QUE NECESITA SABER:   El conteo de patadas mide cuánto se está moviendo nj bebé en el útero   Mehnaz patada de nj bebé podría sentirse pearl mehnaz torcedura, mehnaz vuelta, un crujido, un meneo o un golpe  Es común sentir a tu bebé patear a las 32 a 29 semanas de [place]  Es posible que sienta al bebé patear ya a las 20 semanas de [place]  Puede que desee empezar a contar a las 28 semanas  INSTRUCCIONES SOBRE EL JIMMY HOSPITALARIA:   Comuníquese inmediatamente con barriga médico si:  · Usted siente un cambio en el número de patadas o movimientos de barriga bebé  · Siente menos de 10 patadas en 2 horas  · Usted tiene preguntas o inquietudes acerca de los movimientos de barriga bebé  Por qué realizar el conteo de patadas: Los movimientos de barriga bebé podrían proporcionar información de la matt de barriga bebé  Es posible que si hay problemas, barriga bebé se mueva menos o nada en lo absoluto  El bebé podría moverse menos si no recibe suficiente oxígeno o alimento de la placenta  No fume mientras está embarazada  Fumar disminuye la cantidad de oxígeno que llega a barriga bebé  Hable con barriga médico si necesita ayuda para dejar de fumar  Los problemas que se encuentran en mehnaz etapa más temprana son más fáciles de tratar  Cuándo realizar el conteo de patadas:  · Cuente las patadas en el mismo horario todos los GRASSE  · Realice el conteo de las patadas cuando barriga bebé esté despierto y [place]  Barriga bebé podría estar más activo en la tarde  Cómo realizar el conteo de patadas: Revise que barriga bebé esté despierto antes de realizar el conteo de patadas  Usted puede despertar a barriga bebé empujando barriga estómago suavemente, caminando o tomando algo frío  Barriga médico podría indicarle diferentes maneras de realizar el conteo  Es posible que le indique que yee lo siguiente:  · Use mehnaz gráfica o un reloj para mantener un registro de la hora en que comienza y termina de contar  · Siéntese en mehnaz silla o acuéstese en barriga costado derecho  · Coloque baljinder mary en la parte más na de barriga BJURHOLM  · Cuente hasta que llegue a las 10 patadas   Escriba cuánto tiempo le lleva contar las 10 patadas  · Podría skye de 30 minutos a 2 horas para contar 10 patadas  No debería de skye más de 2 horas para contar 10 patadas  Acuda a baljinder consultas de control con barriga médico según le indicaron  Anote baljinder preguntas para que se acuerde de hacerlas scar baljinder visitas  © Copyright [address] Information is for End User's use only and may not be sold, redistributed or otherwise used for commercial purposes  All illustrations and images included in CareNotes® are the copyrighted property of A D A Sandbox  or [employer] [facility] es sólo para uso en educación  Barriga intención no es darle un consejo médico sobre enfermedades o tratamientos  Colsulte con barriga Randolph Hu farmacéutico antes de seguir cualquier régimen médico para saber si es seguro y efectivo para usted  El embarazo de la semana 35 a la 38   LO QUE NECESITA SABER:   Al principio de las 37 semanas se considera que usted está en término completo  Podría ser mas fácil que usted respire si barriga bebé se ha posicionado con la radha hacia abajo  Es posible que usted necesite orinar con mayor frecuencia ya que el bebé podría estar presionando barriga vejiga  Usted también podría sentir más molestias y cansarse fácilmente  INSTRUCCIONES SOBRE EL JIMMY HOSPITALARIA:   Busque atención médica de inmediato si:  · Usted presenta un anna dolor de radha que no desaparece  · Usted tiene cambios en la visión nuevos o en aumento, pearl visión borrosa o con manchas  · Usted tiene inflamación nueva o creciente en barriga miguelito o mary  · Usted tiene manchado o sangrado vaginal     · Usted rompe adi o siente un chorro o gotas de agua tibia que le está bajando por barriga vagina  Comuníquese con barriga médico si:  · Usted tiene más de 5 contracciones en 1 hora  · Usted nota algún cambio en los movimientos de barriga bebé      · Usted tiene calambres, presión o tensión abdominal     · Usted tiene un cambio en la secreción vaginal     · Usted tiene escalofríos o fiebre  · Usted tiene comezón, ardor o dolor vaginal     · Usted tiene mehnaz secreción vaginal amarillenta, verdosa, antony o de [employer]  · Usted tiene dolor o ardor al Charlanne Felisha, orina menos de lo habitual o tiene [place] rosada o sanguinolenta  · Usted tiene preguntas o inquietudes acerca de barriga condición o cuidado  Cómo cuidarse en esta etapa de barriga embarazo:  · Consuma alimentos saludables y variados  Alimentos saludables incluyen frutas, verduras, panes de amber integral, alimentos lácteos bajos en grasa, frijoles, janeth magras y pescado  [place] líquidos pearl se le haya indicado  Pregunte cuánto líquido debe skye cada día y cuáles líquidos son los más adecuados para usted  Limite el consumo de cafeína a menos de [place] [address]  Limite el consumo de pescado a 2 porciones cada semana  Escoja pescado con concentraciones bajas de jose roberto pearl atún al natural enlatado, camarón, salmón, bacalao o tilapia  No coma pescado con concentraciones altas de jose roberto pearl pez mariza, caballa gigante, pargo rayado y tiburón  · [address] [place]  Barriga necesidad de ciertas vitaminas y [address] [facility], pearl el ácido fólico, aumenta scar el Shelly Olivo  Las vitaminas prenatales proporcionan algunas de las vitaminas y minerales adicionales que usted necesita  Las vitaminas prenatales también podrían ayudar a disminuir el riesgo de ciertos defectos de nacimiento  · Descanse tanto pearl sea necesario  Levante baljinder pies si tiene inflamación en baljinder tobillos y pies  · No fume  Fumar aumenta el riesgo de aborto espontáneo y otros problemas de matt scar barriga [place] Olivo  Fumar puede causar que barriga bebé nazca antes de tiempo o que pese menos al nacer  Solicite información a barriga médico si usted necesita ayuda para dejar de fumar  · No consuma alcohol  El alcohol pasa de barriga cuerpo al bebé a través de la placenta   Puede afectar el desarrollo del cerebro de barriga bebé y provocar el síndrome de alcoholismo fetal (SAF)  El SAF es un piyush de condiciones que causan problemas [place], de comportamiento y de crecimiento  · Consulte con nj médico antes de skye cualquier medicamento  Muchos medicamentos pueden perjudicar a nj bebé si usted los harris [address]  No tome ningún medicamento, vitaminas, hierbas o suplementos sin gloria consultar con nj Jo [place]  use drogas ilegales o de la haq (pearl marihuana o cocaína) mientras está embarazada  · Hable con nj médico antes de viajar  Es posible que no pueda viajar en avión después de las 36 [address]  También le puede recomendar que evite largos viajes por carretera  Consejos de seguridad:  · Evite jacuzzis y saunas  No use un jacuzzi o un sauna mientras usted está embarazada, especialmente scar el primer trimestre  Los [facility] y los saunas aumentan la temperatura de nj bebé y el riesgo de defectos de nacimiento  · Evite la toxoplasmosis  [place] es mehnaz infección causada por comer carne cruda o estar cerca del excremento de un luis infectado  [place] puede causar malformaciones congénitas, aborto espontáneo y Donny Schein  [place] las mary después de tocar carne cruda  Asegúrese de que la carne esté jonnie cocida antes de comerla  Evite los huevos crudos y la Bonne Read  Use guantes o pida que alguien la ayude a limpiar la caja de arena del luis mientras usted Arthea Mettle  · Consulte con nj médico acerca de viajar  El [address] cómodo para viajar es scar el otto trimestre  Pregunte a nj médico si usted puede viajar después de las 36 semanas  Es posible que no pueda viajar en avión después de las [address]  También le puede recomendar que evite largos viajes por carretera  Cambios que están ocurriendo con nj bebé: Para las 38 semanas, nj bebé podría pesar entre 6 y 5 libras   Nj bebé podría medir alrededor de 14 pulgadas de ana desde la punta de la radha hasta la rabadilla (parte inferior del bebé)  Barriga bebé escucha lo suficientemente jonnie pearl para reconocer barriga voz  Conforme barriga bebé crece más, usted podría sentir menos patadas y sentir más que barriga bebé se estira y [place]  Barriga bebé podría moverse en posición con la radha hacia abajo  Brariga bebé también descansará en la parte inferior de barriga abdomen  Lo que necesita saber acerca del cuidado prenatal: Barriga médico le revisará barriga presión arterial y [place]  Es posible que también necesite lo siguiente:  · Un examen de orina también podría realizarse para revisarle el azúcar y la proteína  Estas son señales de diabetes gestacional o de infección  La proteína en barriga orina también podría ser mehnaz señal de preeclampsia  La preeclampsia es mehnaz condición que puede desarrollarse scar la semana 21 o después en barriga embarazo  Esta provoca presión arterial maría y [employer] con baljinder riñones y [place]  · Los análisis de lelia se pueden realizar para revisar si tiene signos de anemia (nivel bajo del gaston)  · La vacuna Tdap podría ser recomendado por barriga médico     · El examen del estreptococo del piyush B es un examen que se realiza para verificar si hay infección por estreptococos del piyush B  El estreptococo del piyush B es un tipo de bacteria que se puede encontrar en la vagina o el recto  Podría ser transmitida a barriga bebé scar el parto si usted la tiene  Barriga médico podría skye Marlane Sieving de barriga vagina o recto y alirio la Carla Box al laboratorio para que la examinen  · La altura uterina es mehnaz medición del útero para controlar el desarrollo de barriga bebé  [employer] por lo general es igual al número de [address] que usted tiene de embarazo  Barriga médico también podría revisar la posición de barriga bebé  · El ritmo cardíaco de brariga bebé será revisado  © Copyright [address] Information is for End User's use only and may not be sold, redistributed or otherwise used for commercial purposes   All illustrations and images included in CareNotes® are the copyrighted property of A D A M , Inc  or [facility] información es sólo para uso en educación  Barriga intención no es darle un consejo médico sobre enfermedades o tratamientos  Colsulte con barriga Rema Cuauhtemoc farmacéutico antes de seguir cualquier régimen médico para saber si es seguro y efectivo para usted

## 2021-02-11 NOTE — ASSESSMENT & PLAN NOTE
Hospitalized 8/1/20-8/4/20 (tx with IV Ceftriaxone x3 days, then PO Vantin x4 days)  S/p Keflex treatment 1/27/21  Continue Macrobid suppression

## 2021-02-11 NOTE — PROGRESS NOTES
OB/GYN  PN Visit  Eldon Smith  [id number]  2/11/2021  3:35 PM  Dr Mihai Quach MD    S: 38 y o  [id number] 34w1d here for PN visit  She denies contractions  She denies leakage of fluid and vaginal bleeding  She reports good fetal movement  Her pregnancy is complicated by history of pyelonephritis and obesity  O:  Vitals:    02/11/21 1534   BP: 116/77   Pulse: 91     Physical Exam  Vitals signs reviewed  Constitutional:       General: She is not in acute distress  Appearance: She is well-developed  She is not diaphoretic  Cardiovascular:      Rate and Rhythm: Normal rate  Pulmonary:      Effort: Pulmonary effort is normal    Abdominal:      Palpations: Abdomen is soft  Genitourinary:     Comments: Gravid, nontender  Fundal height: 34cm  FHT: 133bpm  Skin:     General: Skin is warm and dry  Neurological:      Mental Status: She is alert and oriented to person, place, and time     Psychiatric:         Behavior: Behavior normal          A/P:  Problem List Items Addressed This Visit        Unprioritized    H/O gastric bypass - Primary     Needs serum vitamin levels checked (vit D & B12, folate, calcium, iron studies) - Completed  Needs consultation with bariatric dietician - Declined  Needs vitamin D supplementation - Continue  Serial growth scans - Patient no showed for her appointment in February          Obesity in pregnancy     Pre-pregnant BMI=30 28  Needs early GDM screen - done WNL  Patient instructed to reschedule her growth scan         Pyelonephritis during pregnancy     Hospitalized 8/1/20-8/4/20 (tx with IV Ceftriaxone x3 days, then PO Vantin x4 days)  S/p Keflex treatment 1/27/21  Continue Macrobid suppression         Previous c/s x3     Plan for RLTCS at 39 weeks          Chlamydia     Culture + 9/18/20  Needs tx - given 10/5/20  Needs ELVIRA - done 11/16/20 negative         AMA      NIPT negative         Request for sterilization     MA 31 signed 12//31/2021  Eulogio confirmed again today that she would like tubal sterilization           34 weeks gestation of pregnancy     - Continue PNV  - Labor precautions reviewed  - Fetal kick counts reviewed  - Ultrasounds: Patient encouraged to reschedule growth scan  - Tdap: Administered 2020  - Flu Shot: Administered 2020  - Delivery:  section at 39 weeks unless otherwise indicated  - Contraception: Sterilization                    Future Appointments   Date Time Provider Anayeli Rachel   2021  9:00 AM OBGYN PGY-3 RESIDENT [place] [address]   2021  3:30 PM Elna Meigs, [address] [place] [address]   3/3/2021  3:00 PM Mandi Garcia MD [place] [address]   3/11/2021  3:30 PM Elna Meigs, [address] [place] [address]   3/18/2021  3:15 PM OBGYN PGY-3  [facility] TINY Jordan MD  2021  3:35 PM

## 2021-02-11 NOTE — ASSESSMENT & PLAN NOTE
Needs serum vitamin levels checked (vit D & B12, folate, calcium, iron studies) - Completed  Needs consultation with bariatric dietician - Declined  Needs vitamin D supplementation - Continue  Serial growth scans - Patient no showed for her appointment in February

## 2021-02-11 NOTE — ASSESSMENT & PLAN NOTE
Pre-pregnant BMI=30 28  Needs early GDM screen - done WNL  Patient instructed to reschedule her growth scan

## 2021-02-11 NOTE — ASSESSMENT & PLAN NOTE
- Continue PNV  - Labor precautions reviewed  - Fetal kick counts reviewed  - Ultrasounds: Patient encouraged to reschedule growth scan  - Tdap: Administered 2020  - Flu Shot: Administered 2020  - Delivery:  section at 39 weeks unless otherwise indicated  - Contraception: Sterilization

## 2021-02-15 ENCOUNTER — TELEPHONE (OUTPATIENT)
Dept: OBGYN CLINIC | Facility: CLINIC | Age: 36
End: 2021-02-15

## 2021-02-17 NOTE — PROGRESS NOTES
OB/GYN  PN Visit  Brian Mendez  [id number]  2021  9:39 AM  Dr Yani Flowers MD    S: 38 y o  [id number] 35w1d here for PN visit  She denies contractions  She denies leakage of fluid and vaginal bleeding  She reports good fetal movement  Her pregnancy is complicated by history of gastric bypass, history of 3 prior  sections, pyelonephritis, obesity, AMA, Chlamydia s/p treatment, and request for sterilization  O:  Vitals:    21 0920   BP: 117/86   Pulse: 93     Physical Exam  Vitals signs reviewed  Constitutional:       General: She is not in acute distress  Appearance: She is well-developed  She is not diaphoretic  Cardiovascular:      Rate and Rhythm: Normal rate  Pulmonary:      Effort: Pulmonary effort is normal    Abdominal:      Palpations: Abdomen is soft  Genitourinary:     Comments: Gravid, nontender  FHT: 148bpm  Fundal height: 36cm  Skin:     General: Skin is warm and dry  Neurological:      Mental Status: She is alert and oriented to person, place, and time     Psychiatric:         Behavior: Behavior normal        A/P:  Problem List Items Addressed This Visit        Unprioritized    H/O gastric bypass     Needs serum vitamin levels checked (vit D & B12, folate, calcium, iron studies) - Completed  Needs consultation with bariatric dietician - Declined  Needs vitamin D supplementation - Continue  Serial growth scans - Patient scheduled for 2021          Obesity in pregnancy     Pre-pregnant BMI=30 28  Needs early GDM screen - done WNL  Growth scan scheduled         Pyelonephritis during pregnancy     Hospitalized 20-20 (tx with IV Ceftriaxone x3 days, then PO Vantin x4 days)  S/p Keflex treatment 21  Continue Macrobid suppression         Previous c/s x3     Plan for RLTCS at 39 weeks         Chlamydia     Culture + 20  Needs tx - given 10/5/20  Needs ELVIRA - done 20 negative         AMA     NIPT negative         Request for sterilization     MA-31 signed 21  Patient reconfirmed that she desires sterilization at time of          34 weeks gestation of pregnancy     - Continue PNV  - Labor precautions reviewed  - Fetal kick counts reviewed  - Ultrasounds: Growth scan scheduled for 2021  - Tdap: Administered 2020  - Flu Shot: Administered 2020  - Delivery:  section at 39 weeks unless otherwise indicated  - Contraception: Sterilization            Other Visit Diagnoses     Prenatal care in third trimester    -  Primary            Future Appointments   Date Time Provider Anayeli Ramos   2021  9:30 AM   [facility]   2021  3:30 PM Litzy Sandhu, [address] [place] [address] & [facility]   3/3/2021  3:00 PM Elizabeth George MD [address]   3/11/2021  3:30 PM Litzyliset Sandhu, [address] [facility] & [facility]   3/18/2021  3:15 PM OBGYN PGY-3  [facility] TINY Jordan MD  2021  9:39 AM

## 2021-02-18 ENCOUNTER — ROUTINE PRENATAL (OUTPATIENT)
Dept: OBGYN CLINIC | Facility: CLINIC | Age: 36
End: 2021-02-18

## 2021-02-18 VITALS
HEART RATE: 93 BPM | WEIGHT: 177 LBS | SYSTOLIC BLOOD PRESSURE: 117 MMHG | DIASTOLIC BLOOD PRESSURE: 86 MMHG | BODY MASS INDEX: 35.75 KG/M2

## 2021-02-18 DIAGNOSIS — A74.9 CHLAMYDIA: ICD-10-CM

## 2021-02-18 DIAGNOSIS — O34.219 PREVIOUS CESAREAN DELIVERY, ANTEPARTUM: ICD-10-CM

## 2021-02-18 DIAGNOSIS — O99.210 OBESITY IN PREGNANCY: ICD-10-CM

## 2021-02-18 DIAGNOSIS — Z98.84 H/O GASTRIC BYPASS: ICD-10-CM

## 2021-02-18 DIAGNOSIS — Z34.93 PRENATAL CARE IN THIRD TRIMESTER: Primary | ICD-10-CM

## 2021-02-18 DIAGNOSIS — Z3A.35 35 WEEKS GESTATION OF PREGNANCY: ICD-10-CM

## 2021-02-18 DIAGNOSIS — O23.00 PYELONEPHRITIS DURING PREGNANCY: ICD-10-CM

## 2021-02-18 DIAGNOSIS — Z30.2 REQUEST FOR STERILIZATION: ICD-10-CM

## 2021-02-18 DIAGNOSIS — O09.523 MULTIGRAVIDA OF ADVANCED MATERNAL AGE IN THIRD TRIMESTER: ICD-10-CM

## 2021-02-18 PROCEDURE — PNV: Performed by: OBSTETRICS & GYNECOLOGY

## 2021-02-18 NOTE — ASSESSMENT & PLAN NOTE
Needs serum vitamin levels checked (vit D & B12, folate, calcium, iron studies) - Completed  Needs consultation with bariatric dietician - Declined  Needs vitamin D supplementation - Continue  Serial growth scans - Patient scheduled for 2/22/2021

## 2021-02-18 NOTE — PATIENT INSTRUCTIONS
Conteo de patadas en el embarazo   LO QUE NECESITA SABER:   El conteo de patadas mide cuánto se está moviendo barriga bebé en el útero  Mehnaz patada de barriga bebé podría sentirse pearl mehnaz torcedura, mehnaz vuelta, un crujido, un meneo o un golpe  Es común sentir a tu bebé patear a las 32 a 29 semanas de [place]  Es posible que sienta al bebé patear ya a las 20 semanas de [place]  Puede que desee empezar a contar a las 28 semanas  INSTRUCCIONES SOBRE EL JIMMY HOSPITALARIA:   Comuníquese inmediatamente con barriga médico si:  · Usted siente un cambio en el número de patadas o movimientos de barriga bebé  · Siente menos de 10 patadas en 2 horas  · Usted tiene preguntas o inquietudes acerca de los movimientos de barriga bebé  Por qué realizar el conteo de patadas: Los movimientos de barriga bebé podrían proporcionar información de la matt de barriga bebé  Es posible que si hay problemas, barriga bebé se mueva menos o nada en lo absoluto  El bebé podría moverse menos si no recibe suficiente oxígeno o alimento de la placenta  No fume mientras está embarazada  Fumar disminuye la cantidad de oxígeno que llega a barriga bebé  Hable con barriga médico si necesita ayuda para dejar de fumar  Los problemas que se encuentran en mehnaz etapa más temprana son más fáciles de tratar  Cuándo realizar el conteo de patadas:  · Cuente las patadas en el mismo horario todos los GRASSE  · Realice el conteo de las patadas cuando barriga bebé esté despierto y [place]  Barriga bebé podría estar más activo en la tarde  Cómo realizar el conteo de patadas: Revise que barriga bebé esté despierto antes de realizar el conteo de patadas  Usted puede despertar a barriga bebé empujando barriga estómago suavemente, caminando o tomando algo frío  Barriga médico podría indicarle diferentes maneras de realizar el conteo  Es posible que le indique que yee lo siguiente:  · Use mehnaz gráfica o un reloj para mantener un registro de la hora en que comienza y termina de contar      · Siéntese en mehnaz silla o acuéstese en barriga costado derecho  · Coloque baljinder mary en la parte más na de barriga BJURHOLM  · Cuente hasta que llegue a las 10 patadas  Escriba cuánto tiempo le lleva contar las 10 patadas  · Podría skye de 30 minutos a 2 horas para contar 10 patadas  No debería de skye más de 2 horas para contar 10 patadas  Acuda a baljinder consultas de control con barriga médico según le indicaron  Anote baljinder preguntas para que se acuerde de hacerlas scar baljinder visitas  © Copyright [address] Information is for End User's use only and may not be sold, redistributed or otherwise used for commercial purposes  All illustrations and images included in CareNotes® are the copyrighted property of [employer]  or [address] [place] [address] es sólo para uso en educación  Barriga intención no es darle un consejo médico sobre enfermedades o tratamientos  Colsulte con barriga Iain Arreguin farmacéutico antes de seguir cualquier régimen médico para saber si es seguro y efectivo para usted  Primeros signos del parto   LO QUE USTED DEBE SABER:   Los signos de parto temprano son cambios en barriga cuerpo que permiten que barriga bebé pase por barriga canal del parto  DESPUÉS DE SER DADO DE JIMMY:   Signos y síntomas del parto temprano:   · El aligeramiento  ocurre cuando barriga bebé baja por dentro de barriga pelvis  Puede ser que usted sienta un aumento de presión en barriga pelvis  Puede que esto suceda de unas cuantas semanas a unas horas antes que [employer]  · Las contracciones  son calambres y endurecimiento que ocurren en barriga útero para ayudar a  al bebé a través de barriga canal de parto  Las contracciones ocurren con regularidad y con Lenora Francisco  Cada mehnaz dura aproximadamente de 30 a 79 segundos y se hacen más alley hasta que usted de a dennis a barriga bebé  Las contracciones no se alivian con el movimiento  El dolor empieza en la parte inferior de barriga espalda y se [place] barriga abdomen      · El borramiento del útero (adelgazamiento) ocurre cuando barriga útero se suaviza y adelgaza para que pueda abrirse para el bebé con facilidad  Barriga médico de cabecera u obstetra le examinará el mili del útero para natanael si hay borramiento  · La dilatación  es el ensanchamiento del mili del [place], [place] para la salida del bebé  Barriga médico de cabecera u obstetra le examinará el mili del útero para natanael si hay dilatación  Barriga mili del útero estará completamente abierto y listo para el alumbramiento cuando tenga 10 centímetros de dilatación  · El aumento de muestras  de la vagina puede suceder  Puede que las muestras ronnie rosadas, claras o un poquito sangrientas  Estas muestras pueden también llamarse muestras de [place]  Las muestras de lelia consisten de un tapón [employer] se forma y Verdia Slider barriga mili del útero scar el [facility]  · La ruptura de membranas  es mehnaz liberación repentina de líquido ginger de la vagina  También se le conoce pearl ruptura jam  Puede que barriga médico de cabecera u obstetra necesite romperle la adi si no se rompe por sí lonnie  Parto falso:  Puede ser que usted tenga signos de [facility], los cuales [place] se conocen pearl contracciones de [place]  El [address] es común y puede que suceda varias semanas o días antes de barriga parto actual  Las contracciones no son regulares, y no suceden a corto tiempo la mehnaz de la [place]  El dolor es generalmente leve, no empeora y se siente solo en el frente  Puede que las contracciones [place] sucedan al final del día y paren después que usted cambie de posición, camine o descanse  Comuníquese con barriga médico de cabecera u obstetra si:   · Usted tiene [employer] parte inferior de la espalda o abdomen  · Usted nota barriga tapón mucoso o tiene muestras  · Usted tiene preguntas o inquietudes acerca de barriga condición o cuidado    Busque ayuda inmediatamente o llame al 911 si:   · Usted tiene contracciones regulares y dolorosas con menos de 5 minutos de diferencia la Evern Naas de la otra y que sorto de 27 a 79 segundos cada mehnaz  · Usted tiene mucho sangrado vaginal      · Usted tiene un goteo nikole o un desborde repentino de líquido ginger [employer] de la vagina  · Usted nota mehnaz disminución repentina en los movimientos de nj bebé  © 2014 [address] is for End User's use only and may not be sold, redistributed or otherwise used for commercial purposes  All illustrations and images included in CareNotes® are the copyrighted property of A D A M , Inc  or Mando Hein  Esta información es sólo para uso en educación  Nj intención no es darle un consejo médico sobre enfermedades o tratamientos  Colsulte con nj Randolph Hu farmacéutico antes de seguir cualquier régimen médico para saber si es seguro y efectivo para usted

## 2021-02-18 NOTE — ASSESSMENT & PLAN NOTE
- Continue PNV  - Labor precautions reviewed  - Fetal kick counts reviewed  - Ultrasounds: Growth scan scheduled for 2021  - Tdap: Administered 2020  - Flu Shot: Administered 2020  - Delivery:  section at 39 weeks unless otherwise indicated  - Contraception: Sterilization

## 2021-02-19 ENCOUNTER — TELEPHONE (OUTPATIENT)
Dept: PERINATAL CARE | Facility: OTHER | Age: 36
End: 2021-02-19

## 2021-02-19 NOTE — TELEPHONE ENCOUNTER
Spoke with patient and confirmed appointment with [facility]  1 support person ( must be over age of 15) may accompany you for your appointment  You and your support person must wear a mask ( [place] [facility])  [facility] does not allow cell phone use, recording device or streaming during the ultrasound  Instructed to call [facility] office prior to entering building  Check in and rooming questions will be done via phone  Inside office # provided:  ?    GiveForward line: [phone] Do you or your support person currently have:  Fever or flu- like symptoms? NO  Symptoms of upper respiratory infection like runny nose, sore throat or cough? NO  Do you have new headache that you have not had in the past?NO  Have you experienced any new shortness of breath recently? NO  Do you have any new diarrhea, nausea or vomiting? NO  Have you recently been in contact with anyone who has been sick or diagnosed with COVID-19 infection? NO  Have you been recommended to quarantine because of an exposure to a confirmed positive COVID19 person? NO  You and your support person will be screened upon arrival   ?  Patient verbalized understanding of all instructions  YES

## 2021-02-21 NOTE — PROGRESS NOTES
Please refer to the [facility] ultrasound report in Ob Procedures for additional information regarding today's visit

## 2021-02-22 ENCOUNTER — ULTRASOUND (OUTPATIENT)
Dept: PERINATAL CARE | Facility: OTHER | Age: 36
End: 2021-02-22
Payer: COMMERCIAL

## 2021-02-22 VITALS
WEIGHT: 177.2 LBS | HEART RATE: 92 BPM | SYSTOLIC BLOOD PRESSURE: 109 MMHG | BODY MASS INDEX: 35.72 KG/M2 | HEIGHT: 59 IN | DIASTOLIC BLOOD PRESSURE: 73 MMHG

## 2021-02-22 DIAGNOSIS — Z3A.35 35 WEEKS GESTATION OF PREGNANCY: ICD-10-CM

## 2021-02-22 DIAGNOSIS — O34.219 PREVIOUS CESAREAN DELIVERY, ANTEPARTUM: ICD-10-CM

## 2021-02-22 DIAGNOSIS — O09.523 ELDERLY MULTIGRAVIDA, THIRD TRIMESTER: Primary | ICD-10-CM

## 2021-02-22 PROCEDURE — 99213 OFFICE O/P EST LOW 20 MIN: CPT | Performed by: OBSTETRICS & GYNECOLOGY

## 2021-02-22 PROCEDURE — 1036F TOBACCO NON-USER: CPT | Performed by: OBSTETRICS & GYNECOLOGY

## 2021-02-22 PROCEDURE — 76816 OB US FOLLOW-UP PER FETUS: CPT | Performed by: OBSTETRICS & GYNECOLOGY

## 2021-02-22 NOTE — PATIENT INSTRUCTIONS
Conteo de patadas en el embarazo   LO QUE NECESITA SABER:   El conteo de patadas mide cuánto se está moviendo barriga bebé en el útero  Mehnaz patada de barriga bebé podría sentirse pearl mehnaz torcedura, mehnaz vuelta, un crujido, un meneo o un golpe  Es común sentir a tu bebé patear a las 32 a 29 semanas de [place]  Es posible que sienta al bebé patear ya a las 20 semanas de [place]  Puede que desee empezar a contar a las 28 semanas  INSTRUCCIONES SOBRE EL JIMMY HOSPITALARIA:   Comuníquese inmediatamente con barriga médico si:  · Usted siente un cambio en el número de patadas o movimientos de barriga bebé  · Siente menos de 10 patadas en 2 horas  · Usted tiene preguntas o inquietudes acerca de los movimientos de barriga bebé  Por qué realizar el conteo de patadas: Los movimientos de barriga bebé podrían proporcionar información de la matt de barriga bebé  Es posible que si hay problemas, barriga bebé se mueva menos o nada en lo absoluto  El bebé podría moverse menos si no recibe suficiente oxígeno o alimento de la placenta  No fume mientras está embarazada  Fumar disminuye la cantidad de oxígeno que llega a barriga bebé  Hable con barriga médico si necesita ayuda para dejar de fumar  Los problemas que se encuentran en mehnaz etapa más temprana son más fáciles de tratar  Cuándo realizar el conteo de patadas:  · Cuente las patadas en el mismo horario todos los GRASSE  · Realice el conteo de las patadas cuando barriga bebé esté despierto y [place]  Barriga bebé podría estar más activo en la tarde  Cómo realizar el conteo de patadas: Revise que barriga bebé esté despierto antes de realizar el conteo de patadas  Usted puede despertar a barriga bebé empujando barriga estómago suavemente, caminando o tomando algo frío  Barriga médico podría indicarle diferentes maneras de realizar el conteo  Es posible que le indique que yee lo siguiente:  · Use mehnaz gráfica o un reloj para mantener un registro de la hora en que comienza y termina de contar      · Siéntese en mehnaz silla o acuéstese en barriga costado derecho  · Coloque baljinder mary en la parte más na de barriga BJURHOLM  · Cuente hasta que llegue a las 10 patadas  Escriba cuánto tiempo le lleva contar las 10 patadas  · Podría skye de 30 minutos a 2 horas para contar 10 patadas  No debería de skye más de 2 horas para contar 10 patadas  Acuda a baljinder consultas de control con barriga médico según le indicaron  Anote baljinder preguntas para que se acuerde de hacerlas scar baljinder visitas  © Copyright [address] Information is for End User's use only and may not be sold, redistributed or otherwise used for commercial purposes  All illustrations and images included in CareNotes® are the copyrighted property of A D A M [employer]  or [address] [place] [address] es sólo para uso en educación  Barriga intención no es darle un consejo médico sobre enfermedades o tratamientos  Colsulte con barriga [place] farmacéutico antes de seguir cualquier régimen médico para saber si es seguro y efectivo para usted

## 2021-02-22 NOTE — LETTER
February 22, 2021     [address]  [address], [address] [facility] [address]    Patient: Marco Antonio Acosta   YOB: 1985   Date of Visit: 2/22/2021       Dear Dr Michelle Armijo:    Thank you for referring Marco Antonio Acosta to me for evaluation  Below are my notes for this consultation  If you have questions, please do not hesitate to call me  I look forward to following your patient along with you  Sincerely,        Ольга Cason MD        CC: No Recipients  Ольга Cason MD  2/21/2021  7:49 AM  Sign when Signing Visit   Please refer to the [facility] ultrasound report in Ob Procedures for additional information regarding today's visit

## 2021-02-24 ENCOUNTER — TELEPHONE (OUTPATIENT)
Dept: OBGYN CLINIC | Facility: CLINIC | Age: 36
End: 2021-02-24

## 2021-02-25 ENCOUNTER — ROUTINE PRENATAL (OUTPATIENT)
Dept: OBGYN CLINIC | Facility: CLINIC | Age: 36
End: 2021-02-25

## 2021-02-25 VITALS
WEIGHT: 178.2 LBS | DIASTOLIC BLOOD PRESSURE: 82 MMHG | SYSTOLIC BLOOD PRESSURE: 119 MMHG | HEIGHT: 59 IN | BODY MASS INDEX: 35.92 KG/M2 | HEART RATE: 83 BPM

## 2021-02-25 DIAGNOSIS — Z34.93 PRENATAL CARE IN THIRD TRIMESTER: Primary | ICD-10-CM

## 2021-02-25 DIAGNOSIS — O34.219 PREVIOUS CESAREAN DELIVERY, ANTEPARTUM: ICD-10-CM

## 2021-02-25 DIAGNOSIS — Z3A.36 36 WEEKS GESTATION OF PREGNANCY: ICD-10-CM

## 2021-02-25 DIAGNOSIS — O09.523 MULTIGRAVIDA OF ADVANCED MATERNAL AGE IN THIRD TRIMESTER: ICD-10-CM

## 2021-02-25 DIAGNOSIS — O99.210 OBESITY IN PREGNANCY: ICD-10-CM

## 2021-02-25 PROCEDURE — 87591 N.GONORRHOEAE DNA AMP PROB: CPT | Performed by: NURSE PRACTITIONER

## 2021-02-25 PROCEDURE — 99213 OFFICE O/P EST LOW 20 MIN: CPT | Performed by: NURSE PRACTITIONER

## 2021-02-25 PROCEDURE — 3008F BODY MASS INDEX DOCD: CPT | Performed by: OBSTETRICS & GYNECOLOGY

## 2021-02-25 PROCEDURE — 87491 CHLMYD TRACH DNA AMP PROBE: CPT | Performed by: NURSE PRACTITIONER

## 2021-02-25 PROCEDURE — 87150 DNA/RNA AMPLIFIED PROBE: CPT | Performed by: NURSE PRACTITIONER

## 2021-02-25 NOTE — PATIENT INSTRUCTIONS
XIN DE PARTO   Llame a Thurl Nathalie al [phone] para cualquiera de los siguientes:    * Necesito llamar inmediatamente yo si tengo incluso mehnaz pequeña cantidad de LIQUIDO se escapa de mi vagina, con o sin contracciones  * Verona llamar si tengo SANGRADO mehnaz cantidad igual o más que un período  Thania Quirk cantidad de flujo vaginal sangriento es normal al final del embarazo  * Evrona llamar si tengo CONTRACCIONES cada juma minutos scar al [employer]  Necesito un reloj para tiempo  Yo verona tiempo desde el comienzo de mehnaz contracción hasta el comienzo de la siguiente  * De ser necesario ANTES DE  ir al hospital    * Necesito tener un plan para TRANSPORTE a ir al hospital cuando estoy en [place]  Trabajo generalmente no es mehnaz emergencia que requiere mehnaz ambulancia  CUENTAS DEL RETROCESO FETAL    En el tercer trimestre (después de 28 semanas de gestación) deben realizar patada fetal cuentas cada día  Barriga bebé debe moverse al menos 10 veces en 2 horas scar un tiempo [place], mehnaz vez al día  Elegir el atime del día cuando el bebé es [place]  Trate de hacerlo a la misma hora cada día  Entrar en mehnaz posición cómoda y luego escribir el tiempo que tu bebé se mueve gloria  Cuenta cada movimiento hasta que el bebé se mueve 10 veces  Estos movimientos incluyen patadas, puñetazos, codazos, revolotea o rollos  [place] puede tardar entre 5 minutos a 2 horas  Anote el tiempo que sientes movimiento 10 del bebé  Si ha pasado 2 horas y tu bebé no se ha movido al menos 10 veces, usted debe llamar a la oficina de inmediato  [phone]          MASAJE EN LA LEIGH PERINEAL O VAGINAL    Que puedo hacer ahora para reducir las probabilidades de desgarro scar el parto? Masaje alrededor del orificio de la vagina realizado por usted misma (o por barriga davis)    El masaje en esta leigh, ya sea antes del parto o scar la segunda etapa del parto, [place] reducir la probabilidad de desgarro perineal scar el parto  Asimismo, el uso de compresas tibias en el perineo scar la etapa expulsiva del parto puede reducir la pravedad del desparro  [place] sucedera scar la etapa expulsiva del parto  En casa tambien puede reducir las probabilidades de sufrir lesiones durnate el parto de con masajes en la carlyle perineal     Maryln Braver? Todas las mujeres embarazadas a partir de, [place], las 34 semanas de [facility]  Cuando? Usted o barriga davis deben hacer masajes en la carlyle vaginal scar 5 a 10 minutos de 1 a 4 veces por semana  [place]? Use mehnaz mezcla de agua y aceite de [facility], werner u beck con 1 o 2 dedos (mauro la comodidad)  Inserte los dedos en la vagina entre 3 y 5cm  Aplique presion general hacia abajo y [employer] costados scar 5 a [address] 3 y las 9 horas, de 1 a 4 veces por semana  GROUP B STREP    Group B Strep (GBS) es mehnaz bacteria vaginal común  Aproximadamente el 25% de las mujeres normalmente tienen la bacteria GBS en la vagina  NO es mehnaz infección de transmisión sexual  ¡De hecho, no es mehnaz infección! Es solo mehnaz bacteria vaginal normal para muchas mujeres  Sin embargo, la bacteria GBS puede ser peligrosas para un bebé recién nacido si el bebé está expuesto a joseph bacteria particular scar el parto y el nacimiento Y desarrolla mehnaz infección de la bacteria  La probabilidad de mehnaz infección de GBS en recién nacidos para mehnaz anuj que tiene las bacteria GBS en la vagina es cerca de 1% - 2%  Renetta si la infeccion produce, un bebé podría ser gravemente enfermo  Por esta razón, hacemos un cultivo vaginal (Q-Tip de la vagina y el recto) para todas las mujeres embarazadas en el aproximadamente 39 semanas de [place]  Si el cultivo demuestra que hay bacteria GBS presente, no es mehnaz razón para el pánico! [employer] en esta situación dará radha antibióticos de la anuj a través de barriga IV mientras está en parto   Cuando mehnaz madre se trata con antibióticos scar el [place], [place] bebé MICHELLE NUNCA se infecta con la bacteria GBS

## 2021-02-25 NOTE — PROGRESS NOTES
Joni Delcid presents today for routine OB visit at 36w1d  Blood Pressure: 119/82  Wt=80 8 kg (178 lb 3 2 oz); Body mass index is 35 99 kg/m² ; TWG=12 8 kg (28 lb 3 2 oz)  Fetal Heart Rate: 137; Fundal Height (cm): 39 cm  SVE today: Cervical Dilation: Closed /Cervical Effacement: 60 /Fetal Station: -3  Abdomen: gravid, soft, non-tender  She reports pelvic pressure  Reports occasional mild uterine contractions  Denies vaginal bleeding or leaking of fluid  Reports adequate fetal movement of at least 10 movements in 2 hours once daily  Reviewed labor precautions and fetal kick counts as well as pre-eclampsia warning signs  Advised to continue medications and return in 1 week  Current Outpatient Medications   Medication Instructions    cholecalciferol (VITAMIN D3) 1,000 Units, Oral, Daily    hydrocortisone 1 % cream Topical, 4 times daily PRN    nitrofurantoin (MACROBID) 100 mg, Oral, Daily    Prenatal Vit-Fe Fumarate-FA (Prenatal Vitamin) 27-0 8 MG TABS 1 tablet, Oral, Daily       Laboratory workup: initial OB labs (done 9/17/20); 28 week labs (done 1/21/21); 36 week cultures (collected 2/25/21)    Genetic Screening: NIPT negative, did not do MSAFP    Vaccinations: influenza (given 11/16/20);  Tdap (given 12/31/20)    Postpartum contraception:  Desires surgical sterilization (Truddie Gauze signed 12/31/20)    Fetal Ultrasounds:   8/1/20 (6w3d) EDC confirmed  9/1/20 (10w6d) WNL  11/9/20 (20w5d) no previa, herminia WNL & complete  2/22/21 (35w5d) growth=73%, vertex      G4 Problems (from 09/14/20 to present)     Problem Noted Resolved    Abnormal glucola 1/21/2021 by CECE Ibanez No    Overview Addendum 1/22/2021  2:43 PM by CECE Ibanez     Needs 3h GTT - done WNL         AMA 10/15/2020 by Trina Young MD No    Overview Signed 11/16/2020  3:13 PM by Rajat Ibanez     Needs genetic counseling - done 11/9/20  NIPT negative         Chlamydia 10/5/2020 by CECE Ibanez No Overview Addendum 11/20/2020  9:27 AM by CECE Dumont     Culture + 9/18/20  Needs tx - given 10/5/20  Needs ELVIRA - done 11/16/20 negative         Previous c/s x3 9/18/2020 by CECE Dumont No    Overview Signed 9/18/2020  3:16 PM by CECE Dumont     Unable to obtain records from \[facility]\""  Needs repeat c/s @39 weeks         Pyelonephritis during pregnancy 8/1/2020 by Mohan Malloy MD No    Overview Addendum 1/27/2021 11:20 AM by Hakeem Purcell, [address]     Hospitalized 8/1/20-8/4/20 (tx with IV Ceftriaxone x3 days, then PO Vantin x4 days)  Needs Macrobid suppression - started 8/9/2020  UTI suspected 1/27/21 - culture sent, given Keflex x7 days, advised resume Macrobid suppression after Keflex completion         H/O gastric bypass 9/9/2019 by Penny Joshua MD No    Overview Addendum 2/25/2021  4:16 PM by CECE Dumont     Needs serum vitamin levels checked (vit D & B12, folate, calcium, iron studies) - done  Needs consultation with bariatric dietician - ordered 9/18/20, pt did not go  Needs vitamin D supplementation - taking  Serial growth scans - done         Obesity in pregnancy 9/9/2019 by Penny Joshua MD No    Overview Addendum 2/25/2021  4:15 PM by CECE Dumont     Pre-pregnant BMI=30 28  Needs early GDM screen - done WNL  Serial growth scans - done

## 2021-02-26 LAB
C TRACH DNA SPEC QL NAA+PROBE: NEGATIVE
N GONORRHOEA DNA SPEC QL NAA+PROBE: NEGATIVE

## 2021-02-27 LAB — GP B STREP DNA SPEC QL NAA+PROBE: NEGATIVE

## 2021-03-03 ENCOUNTER — TELEPHONE (OUTPATIENT)
Dept: OBGYN CLINIC | Facility: CLINIC | Age: 36
End: 2021-03-03

## 2021-03-03 ENCOUNTER — ROUTINE PRENATAL (OUTPATIENT)
Dept: OBGYN CLINIC | Facility: CLINIC | Age: 36
End: 2021-03-03

## 2021-03-03 VITALS
WEIGHT: 179 LBS | BODY MASS INDEX: 36.15 KG/M2 | DIASTOLIC BLOOD PRESSURE: 84 MMHG | SYSTOLIC BLOOD PRESSURE: 123 MMHG | HEART RATE: 83 BPM

## 2021-03-03 DIAGNOSIS — O34.219 PREVIOUS CESAREAN DELIVERY, ANTEPARTUM: ICD-10-CM

## 2021-03-03 DIAGNOSIS — Z34.93 PRENATAL CARE IN THIRD TRIMESTER: Primary | ICD-10-CM

## 2021-03-03 DIAGNOSIS — Z3A.37 37 WEEKS GESTATION OF PREGNANCY: ICD-10-CM

## 2021-03-03 DIAGNOSIS — O09.523 MULTIGRAVIDA OF ADVANCED MATERNAL AGE IN THIRD TRIMESTER: ICD-10-CM

## 2021-03-03 DIAGNOSIS — O99.210 OBESITY IN PREGNANCY: ICD-10-CM

## 2021-03-03 PROCEDURE — 99213 OFFICE O/P EST LOW 20 MIN: CPT | Performed by: NURSE PRACTITIONER

## 2021-03-03 NOTE — TELEPHONE ENCOUNTER
Patient called, Maltese speaking  Dashawn Law spoke to patient on the phone  She states that she is having dizziness, headaches and contractions every 15 minutes  She denies any bleeding or leakage of fluid  She states the baby is moving  Discussed with Dr Neel Michael  Patient is to try tylenol, hydrate with water, change positions slowly and come to her apt today at [address]  If anything changes and her contractions become more frequent prior to her apt she is to call the office back

## 2021-03-03 NOTE — PATIENT INSTRUCTIONS
XIN DE PARTO   Llame a Charles Daniels al [phone] para cualquiera de los siguientes:    * Necesito llamar inmediatamente yo si tengo incluso mehnaz pequeña cantidad de LIQUIDO se escapa de mi vagina, con o sin contracciones  * Verona llamar si tengo SANGRADO mehnaz cantidad igual o más que un período  Marinell [place] cantidad de flujo vaginal sangriento es normal al final del embarazo  * Verona llamar si tengo CONTRACCIONES cada juma minutos scar al [employer]  Necesito un reloj para tiempo  Yo verona tiempo desde el comienzo de mehnaz contracción hasta el comienzo de la siguiente  * De ser necesario ANTES DE  ir al hospital    * Necesito tener un plan para TRANSPORTE a ir al hospital cuando estoy en [place]  Trabajo generalmente no es mehnaz emergencia que requiere mehnaz ambulancia  CUENTAS DEL RETROCESO FETAL    En el tercer trimestre (después de 28 semanas de gestación) deben realizar patada fetal cuentas cada día  Nj bebé debe moverse al menos 10 veces en 2 horas scar un [facility] [place], mehnaz vez al día  Elegir el atime del día cuando el bebé es [place]  Trate de hacerlo a la misma hora cada día  Entrar en mehnaz posición cómoda y luego escribir el tiempo que tu bebé se mueve gloria  Cuenta cada movimiento hasta que el bebé se mueve 10 veces  Estos movimientos incluyen patadas, puñetazos, codazos, revolotea o rollos  [place] puede tardar entre 5 minutos a 2 horas  Anote el tiempo que sientes movimiento 10 del bebé  Si ha pasado 2 horas y tu bebé no se ha movido al menos 10 veces, usted debe llamar a la oficina de inmediato  [phone]          MASAJE EN LA LEIGH PERINEAL O VAGINAL    Que puedo hacer ahora para reducir las probabilidades de desgarro scar el parto? Masaje alrededor del orificio de la vagina realizado por usted misma (o por nj davis)    El masaje en esta leigh, ya sea antes del parto o scar la segunda etapa del parto, [place] reducir la probabilidad de desgarro perineal scar el parto  Asimismo, el uso de compresas tibias en el perineo scar la etapa expulsiva del parto puede reducir la pravedad del desparro  [place] sucedera scar la etapa expulsiva del parto  En casa tambien puede reducir las probabilidades de sufrir lesiones durnate el parto de con masajes en la carlyle perineal     Richarda Maral? Todas las mujeres embarazadas a partir de, [place], las 34 semanas de [facility]  Cuando? Usted o barriga davis deben hacer masajes en la carlyle vaginal scar 5 a 10 minutos de 1 a 4 veces por semana  Tig[place]? Use mehnaz mezcla de agua y aceite de [facility], werner u beck con 1 o 2 dedos (mauro la comodidad)  Inserte los dedos en la vagina entre 3 y 5cm  Aplique presion general hacia abajo y [employer] costados scar 5 a [address] 3 y las 9 horas, de 1 a 4 veces por semana  GROUP B STREP    Group B Strep (GBS) es mehnaz bacteria vaginal común  Aproximadamente el 25% de las mujeres normalmente tienen la bacteria GBS en la vagina  NO es mehnaz infección de transmisión sexual  ¡De hecho, no es mehnaz infección! Es solo mehnaz bacteria vaginal normal para muchas mujeres  Sin embargo, la bacteria GBS puede ser peligrosas para un bebé recién nacido si el bebé está expuesto a joseph bacteria particular scar el parto y el nacimiento Y desarrolla mehnaz infección de la bacteria  La probabilidad de mehnaz infección de GBS en recién nacidos para mehnaz anuj que tiene las bacteria GBS en la vagina es cerca de 1% - 2%  Renetta si la infeccion produce, un bebé podría ser gravemente enfermo  Por esta razón, hacemos un cultivo vaginal (Q-Tip de la vagina y el recto) para todas las mujeres embarazadas en el aproximadamente 39 semanas de [place]  Si el cultivo demuestra que hay bacteria GBS presente, no es mehnaz razón para el pánico! [employer] en esta situación dará radha antibióticos de la anuj a través de barriga IV mientras está en parto   Cuando mehnaz madre se trata con antibióticos scar el [place], [place] bebé MICHELLE NUNCA se infecta con la bacteria GBS  PREOPERATORIA QUE MUESTRA LAS INSTRUCCTIONES    Antes de barriga operación, usted juega un papel importante en disminuir el riesgo de infección  Lavarse jonnie barriga cuerpo para reducir las bacterias en la piel puede ayudar a prevenir infecciones en el sitio quirúrgico     Por favor [address] semana antes de barriga operación para que estés listo! Semana antes de la cirugía  Si barriga cirujano se indica utilizar un jabón antiséptico que contiene gluconato de clorhexidina (CHG) antes de la cirugía, usted debe obtener radha jabón por lo menos 1 semana antes de barriga operación  Ritu Pinedaman comunes incluyen:  · Aplicare (R)  · Soportar  · Hibiclens (R)    Jabón CHG está disponible en las oficinas de algún médico, [employer] farmacia de [facility] o [address] farmacias por cecy  Si usted es alérgico o sensible a la CHG, comunique barriga médico para que otros jabón antiséptico puede ser Tara Harriet  Si usted no puede comprar para luego conteniendo CHG, usar jabón normal pearl se indica a continuación  Día antes de la cirugía  Usted necesitará ducha la noche antes y la mañana de la [facility]  Usted tendrá que preparar barriga cama y la ropa para que estén limpios y listos después de barriga ducha  · Poner sábanas limpias (las hojas) en barriga cama; deben dormir en sábanas limpias después de la ducha de noche  · Prepárate los paños y toallas limpias, usted necesitará suficiente para 2 duchas  · Dejar de lado limpiarla ropa interior, pijamas y ropa para usar después de [address]  Noche antes de la cirugía    La noche antes de barriga operación, skye barriga primera ducha (ducha 1)  Ducha 1:  · En primer lugar, lavarse el janeth con champú regular y enjuáguela compeltely antes de davon barriga cuerpo  · A continuación, lave barriga cuerpo de radha a los pies con [place] y un paño limpio    · Si fueron mandados por barriga cirujano fue con jabón CHG:  · Utilice 1/2 de la botella de jabón para esta ducha (se utiliza la 1/2 de la botella para barriga ducha por la [place])  · Hacer no conseguir cambio en tus ojos, oídos, nariz, boca o área vaginal   · Si usas jabón normal, trate de usar mehnaz nueva philip si es posible  · Preste especial atención a la carlyle donde estará la incisión; espuma esta área con el jabón de la CHG scar unos 2 minutos  · NO UTILIZAR cualquier otro enjuague jabón o cuerpo en barriga piel scar o después el jabón antiséptico   · Se enjuague completamente con agua corriente  · Use mehnaz toalla limpia para secarlo  · Use ropa interior limpia y ropa/pijamas  · Dormir en sábanas/ropa de cama limpia  Recordatorios:  · No use loción, polvo, desodorante o perfume/loción para después de cualquier tipo en la piel después de la ducha antiséptica  · No afeitar ninguna parte del cuerpo en las 24 horas/día antes de barriga operación  Día de la cirugía  La mañana de barriga operación, tome la segunda ducha (ducha 2)  Ducha 2:  · Siga los mismos pasos pearl ducha 1   · Si fueron mandados por barriga cirujano para davon con el jabón CHG, use la segunda 1/2 de la botella de jabón CHG

## 2021-03-03 NOTE — PROGRESS NOTES
Bala Luis presents today for routine OB visit at 37w0d  Blood Pressure: 123/84  Wt=81 2 kg (179 lb); Body mass index is 36 15 kg/m² ; TWG=13 2 kg (29 lb)  Fetal Heart Rate: 144; Fundal Height (cm): 39 cm  Abdomen: gravid, soft, non-tender  She reports pelvic pressure  Reports occasional mild uterine contractions  Denies vaginal bleeding or leaking of fluid  Reports adequate fetal movement of at least 10 movements in 2 hours once daily  Reviewed labor precautions and fetal kick counts as well as pre-eclampsia warning signs  Advised to continue medications and return in 1 week  Current Outpatient Medications   Medication Instructions    cholecalciferol (VITAMIN D3) 1,000 Units, Oral, Daily    hydrocortisone 1 % cream Topical, 4 times daily PRN    nitrofurantoin (MACROBID) 100 mg, Oral, Daily    Prenatal Vit-Fe Fumarate-FA (Prenatal Vitamin) 27-0 8 MG TABS 1 tablet, Oral, Daily       Laboratory workup: initial OB labs (done 9/17/20); 28 week labs (done 1/21/21); 36 week cultures (done 2/25/21)    Genetic Screening: NIPT negative, did not do MSAFP    Vaccinations: influenza (given 11/16/20);  Tdap (given 12/31/20)    Postpartum contraception:  Desires surgical sterilization (Clementine Huerta signed 12/31/20)    Fetal Ultrasounds:   8/1/20 (6w3d) EDC confirmed  9/1/20 (10w6d) WNL  11/9/20 (20w5d) no previa, herminia WNL & complete  2/22/21 (35w5d) growth=73%, vertex      G4 Problems (from 09/14/20 to present)     Problem Noted Resolved    Abnormal glucola 1/21/2021 by CECE Reynaga No    Overview Addendum 1/22/2021  2:43 PM by CECE Reynaga     Needs 3h GTT - done WNL         AMA 10/15/2020 by Lauren Baltazar MD No    Overview Signed 11/16/2020  3:13 PM by Ellen Talobt, [address]     Needs genetic counseling - done 11/9/20  NIPT negative         Chlamydia 10/5/2020 by CECE Reynaga No    Overview Addendum 3/3/2021  3:16 PM by CECE Reynaga     Culture + 9/18/20  Needs tx - given 10/5/20  Needs ELVIRA - done 11/16/20 negative  Negative 2/25/21         Previous c/s x3 9/18/2020 by CECE Singer No    Overview Addendum 3/3/2021  3:18 PM by CECE Singer     Unable to obtain [facility] from [facility]  Needs repeat c/s @39 weeks - scheduled for 3/18/21 @1000         Pyelonephritis during pregnancy 8/1/2020 by Lidya Maldonado MD No    Overview Addendum 1/27/2021 11:20 AM by Arcelia Baig, [address]     Hospitalized 8/1/20-8/4/20 (tx with IV Ceftriaxone x3 days, then PO Vantin x4 days)  Needs Macrobid suppression - started 8/9/2020  UTI suspected 1/27/21 - culture sent, given Keflex x7 days, advised resume Macrobid suppression after Keflex completion         H/O gastric bypass 9/9/2019 by Afua Gonzales MD No    Overview Addendum 2/25/2021  4:16 PM by CECE Singer     Needs serum vitamin levels checked (vit D & B12, folate, calcium, iron studies) - done  Needs consultation with bariatric dietician - ordered 9/18/20, pt did not go  Needs vitamin D supplementation - taking  Serial growth scans - done         Obesity in pregnancy 9/9/2019 by Afua Gonzales MD No    Overview Addendum 2/25/2021  4:15 PM by CECE Singer     Pre-pregnant BMI=30 28  Needs early GDM screen - done WNL  Serial growth scans - done

## 2021-03-11 ENCOUNTER — ROUTINE PRENATAL (OUTPATIENT)
Dept: OBGYN CLINIC | Facility: CLINIC | Age: 36
End: 2021-03-11

## 2021-03-11 VITALS — DIASTOLIC BLOOD PRESSURE: 77 MMHG | WEIGHT: 178.8 LBS | SYSTOLIC BLOOD PRESSURE: 120 MMHG | BODY MASS INDEX: 36.11 KG/M2

## 2021-03-11 DIAGNOSIS — Z3A.38 38 WEEKS GESTATION OF PREGNANCY: ICD-10-CM

## 2021-03-11 DIAGNOSIS — O09.523 MULTIGRAVIDA OF ADVANCED MATERNAL AGE IN THIRD TRIMESTER: ICD-10-CM

## 2021-03-11 DIAGNOSIS — Z34.93 PRENATAL CARE IN THIRD TRIMESTER: Primary | ICD-10-CM

## 2021-03-11 PROCEDURE — 99213 OFFICE O/P EST LOW 20 MIN: CPT | Performed by: NURSE PRACTITIONER

## 2021-03-11 NOTE — LETTER
Melodie Manzo  1985      To whom it may concern,   Please be advised that Fruitland Aver is to be out of work effective today until 8 weeks after delivery, which is currently scheduled for 3/18/2021  Please contact our office with any questions or concerns      CECE Allred  3/11/2021

## 2021-03-11 NOTE — PATIENT INSTRUCTIONS
XIN DE PARTO   Llame a Elizabeth ching [phone] para cualquiera de los siguientes:    * Necesito llamar inmediatamente yo si tengo incluso mehnaz pequeña cantidad de LIQUIDO se escapa de mi vagina, con o sin contracciones  * Verona llamar si tengo SANGRADO mehnaz cantidad igual o más que un período  [place] cantidad de flujo vaginal sangriento es normal al final del embarazo  * Verona llamar si tengo CONTRACCIONES cada juma minutos scar al [employer]  Necesito un reloj para tiempo  Yo verona tiempo desde el comienzo de mehnaz contracción hasta el comienzo de la siguiente  * De ser necesario ANTES DE  ir al hospital    * Necesito tener un plan para TRANSPORTE a ir al hospital cuando estoy en [place]  Trabajo generalmente no es mehnaz emergencia que requiere mehnaz ambulancia  CUENTAS DEL RETROCESO FETAL    En el tercer trimestre (después de 28 semanas de gestación) deben realizar patada fetal cuentas cada día  Barr[place] bebé debe moverse al menos 10 veces en 2 horas scar un [facility] [place], mehnaz vez al día  Elegir el atime del día cuando el bebé es [place]  Trate de hacerlo a la misma hora cada día  Entrar en mehnaz posición cómoda y luego escribir el tiempo que tu bebé se mueve gloria  Cuenta cada movimiento hasta que el bebé se mueve 10 veces  Estos movimientos incluyen patadas, puñetazos, codazos, revolotea o rollos  [place] puede tardar entre 5 minutos a 2 horas  Anote el tiempo que sientes movimiento 10 del bebé  Si ha pasado 2 horas y tu bebé no se ha movido al menos 10 veces, usted debe llamar a la oficina de inmediato  [phone]          MASAJE EN LA LEIGH PERINEAL O VAGINAL    Que puedo hacer ahora para reducir las probabilidades de desgarro scar el parto? Masaje alrededor del orificio de la vagina realizado por usted misma (o por barriga davis)    El masaje en esta leigh, ya sea antes del parto o scar la segunda etapa del parto, [place] reducir la probabilidad de desgarro perineal scar el parto  Asimismo, el uso de compresas tibias en el perineo scar la etapa expulsiva del parto puede reducir la pravedad del desparro  [place] sucedera scar la etapa expulsiva del parto  En casa tambien puede reducir las probabilidades de sufrir lesiones durnate el parto de con masajes en la carlyle perineal     Karan Montejo? Todas las mujeres embarazadas a partir de, [place], las 34 semanas de [facility]  Cuando? Usted o barriga davis deben hacer masajes en la carlyle vaginal scar 5 a 10 minutos de 1 a 4 veces por semana  [place]? Use mehnaz mezcla de agua y aceite de mar[place]amar[place], werner u beck con 1 o 2 dedos (mauro la comodidad)  Inserte los dedos en la vagina entre 3 y 5cm  Aplique presion general hacia abajo y [employer] costados scar 5 a [address] 3 y las 9 horas, de 1 a 4 veces por semana  GROUP B STREP    Group B Strep (GBS) es mehnaz bacteria vaginal común  Aproximadamente el 25% de las mujeres normalmente tienen la bacteria GBS en la vagina  NO es mehnaz infección de transmisión sexual  ¡De hecho, no es mehnaz infección! Es solo mehnaz bacteria vaginal normal para muchas mujeres  Sin embargo, la bacteria GBS puede ser peligrosas para un bebé recién nacido si el bebé está expuesto a joseph bacteria particular scar el parto y el nacimiento Y desarrolla mehnaz infección de la bacteria  La probabilidad de mehnaz infección de GBS en recién nacidos para mehnaz anuj que tiene las bacteria GBS en la vagina es cerca de 1% - 2%  Renetta si la infeccion produce, un bebé podría ser gravemente enfermo  Por esta razón, hacemos un cultivo vaginal (Q-Tip de la vagina y el recto) para todas las mujeres embarazadas en el aproximadamente 39 semanas de [place]  Si el cultivo demuestra que hay bacteria GBS presente, no es mehnaz razón para el pánico! [employer] en esta situación dará radha antibióticos de la anuj a través de barriga IV mientras está en parto   Cuando mehnaz madre se trata con antibióticos scar el [place], [place] bebé MICHELLE NUNCA se infecta con la bacteria GBS

## 2021-03-11 NOTE — PROGRESS NOTES
Kasia Gan presents today for routine OB visit at 38w1d  Blood Pressure: 120/77  Wt=81 1 kg (178 lb 12 8 oz); Body mass index is 36 11 kg/m² ; TWG=13 1 kg (28 lb 12 8 oz)  Fetal Heart Rate: 132; Fundal Height (cm): 40 cm  Abdomen: gravid, soft, non-tender  She reports pelvic pressure and irregular uterine contractions  Denies vaginal bleeding or leaking of fluid  Reports adequate fetal movement of at least 10 movements in 2 hours once daily  Reviewed labor precautions and fetal kick counts as well as pre-eclampsia warning signs  She is scheduled for repeat c/s with BTL on 3/18/21 @1000  Current Outpatient Medications   Medication Instructions    cholecalciferol (VITAMIN D3) 1,000 Units, Oral, Daily    hydrocortisone 1 % cream Topical, 4 times daily PRN    nitrofurantoin (MACROBID) 100 mg, Oral, Daily    Prenatal Vit-Fe Fumarate-FA (Prenatal Vitamin) 27-0 8 MG TABS 1 tablet, Oral, Daily       Laboratory workup: initial OB labs (done 9/17/20); 28 week labs (done 1/21/21); 36 week cultures (done 2/25/21)    Genetic Screening: NIPT negative, did not do MSAFP    Vaccinations: influenza (given 11/16/20);  Tdap (given 12/31/20)    Postpartum contraception:  Desires surgical sterilization (Johnny Crbatree signed 12/31/20)    Fetal Ultrasounds:   8/1/20 (6w3d) EDC confirmed  9/1/20 (10w6d) WNL  11/9/20 (20w5d) no previa, herminia WNL & complete  2/22/21 (35w5d) growth=73%, vertex      G4 Problems (from 09/14/20 to present)     Problem Noted Resolved    Abnormal glucola 1/21/2021 by CECE Ríos No    Overview Addendum 1/22/2021  2:43 PM by CECE Ríos     Needs 3h GTT - done WNL         AMA 10/15/2020 by Candelario Sung MD No    Overview Signed 11/16/2020  3:13 PM by Parish Ramires, [address]     Needs genetic counseling - done 11/9/20  NIPT negative         Chlamydia 10/5/2020 by CECE Ríos No    Overview Addendum 3/3/2021  3:16 PM by CECE Ríos     Culture + 9/18/20  Needs tx - given 10/5/20  Needs ELVIRA - done 11/16/20 negative  Negative 2/25/21         Previous c/s x3 9/18/2020 by CECE Granados No    Overview Addendum 3/3/2021  3:18 PM by CECE Granados     Unable to obtain records from [facility]  Needs repeat c/s @39 weeks - scheduled for 3/18/21 @1000         Pyelonephritis during pregnancy 8/1/2020 by Oriana Herron MD No    Overview Addendum 1/27/2021 11:20 AM by Ward Rapp, [address]     Hospitalized 8/1/20-8/4/20 (tx with IV Ceftriaxone x3 days, then PO Vantin x4 days)  Needs Macrobid suppression - started 8/9/2020  UTI suspected 1/27/21 - culture sent, given Keflex x7 days, advised resume Macrobid suppression after Keflex completion         H/O gastric bypass 9/9/2019 by Pan Oneill MD No    Overview Addendum 2/25/2021  4:16 PM by CECE Granados     Needs serum vitamin levels checked (vit D & B12, folate, calcium, iron studies) - done  Needs consultation with bariatric dietician - ordered 9/18/20, pt did not go  Needs vitamin D supplementation - taking  Serial growth scans - done         Obesity in pregnancy 9/9/2019 by Pan Oneill MD No    Overview Addendum 2/25/2021  4:15 PM by CECE Granados     Pre-pregnant BMI=30 28  Needs early GDM screen - done WNL  Serial growth scans - done

## 2021-03-18 ENCOUNTER — ANESTHESIA (INPATIENT)
Dept: LABOR AND DELIVERY | Facility: HOSPITAL | Age: 36
DRG: 539 | End: 2021-03-18
Payer: COMMERCIAL

## 2021-03-18 ENCOUNTER — HOSPITAL ENCOUNTER (INPATIENT)
Facility: HOSPITAL | Age: 36
LOS: 2 days | Discharge: HOME | DRG: 539 | End: 2021-03-20
Attending: STUDENT IN AN ORGANIZED HEALTH CARE EDUCATION/TRAINING PROGRAM | Admitting: STUDENT IN AN ORGANIZED HEALTH CARE EDUCATION/TRAINING PROGRAM
Payer: COMMERCIAL

## 2021-03-18 DIAGNOSIS — Z98.891 STATUS POST REPEAT LOW TRANSVERSE CESAREAN SECTION: ICD-10-CM

## 2021-03-18 DIAGNOSIS — Z3A.39 39 WEEKS GESTATION OF PREGNANCY: ICD-10-CM

## 2021-03-18 LAB
ABO GROUP BLD: NORMAL
BASE EXCESS BLDCOA CALC-SCNC: -0.6 MMOL/L (ref 3–11)
BASE EXCESS BLDCOV CALC-SCNC: -0.5 MMOL/L (ref 1–9)
BASOPHILS # BLD AUTO: 0.04 THOUSANDS/ΜL (ref 0–0.1)
BASOPHILS NFR BLD AUTO: 0 % (ref 0–1)
BLD GP AB SCN SERPL QL: NEGATIVE
EOSINOPHIL # BLD AUTO: 0.05 THOUSAND/ΜL (ref 0–0.61)
EOSINOPHIL NFR BLD AUTO: 0 % (ref 0–6)
ERYTHROCYTE [DISTWIDTH] IN BLOOD BY AUTOMATED COUNT: 13 % (ref 11.6–15.1)
ERYTHROCYTE [DISTWIDTH] IN BLOOD BY AUTOMATED COUNT: 13.2 % (ref 11.6–15.1)
HCO3 BLDCOA-SCNC: 29 MMOL/L (ref 17.3–27.3)
HCO3 BLDCOV-SCNC: 26.6 MMOL/L (ref 12.2–28.6)
HCT VFR BLD AUTO: 31.8 % (ref 34.8–46.1)
HCT VFR BLD AUTO: 34.1 % (ref 34.8–46.1)
HGB BLD-MCNC: 10.1 G/DL (ref 11.5–15.4)
HGB BLD-MCNC: 11 G/DL (ref 11.5–15.4)
IMM GRANULOCYTES # BLD AUTO: 0.08 THOUSAND/UL (ref 0–0.2)
IMM GRANULOCYTES NFR BLD AUTO: 1 % (ref 0–2)
LYMPHOCYTES # BLD AUTO: 1.7 THOUSANDS/ΜL (ref 0.6–4.47)
LYMPHOCYTES NFR BLD AUTO: 13 % (ref 14–44)
MCH RBC QN AUTO: 29.4 PG (ref 26.8–34.3)
MCH RBC QN AUTO: 29.9 PG (ref 26.8–34.3)
MCHC RBC AUTO-ENTMCNC: 31.8 G/DL (ref 31.4–37.4)
MCHC RBC AUTO-ENTMCNC: 32.3 G/DL (ref 31.4–37.4)
MCV RBC AUTO: 93 FL (ref 82–98)
MCV RBC AUTO: 93 FL (ref 82–98)
MONOCYTES # BLD AUTO: 1.17 THOUSAND/ΜL (ref 0.17–1.22)
MONOCYTES NFR BLD AUTO: 9 % (ref 4–12)
NEUTROPHILS # BLD AUTO: 10.07 THOUSANDS/ΜL (ref 1.85–7.62)
NEUTS SEG NFR BLD AUTO: 77 % (ref 43–75)
NRBC BLD AUTO-RTO: 0 /100 WBCS
O2 CT VFR BLDCOA CALC: 5.4 ML/DL
OXYHGB MFR BLDCOA: 23.4 %
OXYHGB MFR BLDCOV: 45.9 %
PCO2 BLDCOA: 68.7 MM[HG] (ref 30–60)
PCO2 BLDCOV: 52.6 MM HG (ref 27–43)
PH BLDCOA: 7.24 [PH] (ref 7.23–7.43)
PH BLDCOV: 7.32 [PH] (ref 7.19–7.49)
PLATELET # BLD AUTO: 213 THOUSANDS/UL (ref 149–390)
PLATELET # BLD AUTO: 248 THOUSANDS/UL (ref 149–390)
PMV BLD AUTO: 10.9 FL (ref 8.9–12.7)
PMV BLD AUTO: 11.3 FL (ref 8.9–12.7)
PO2 BLDCOA: 14.2 MM HG (ref 5–25)
PO2 BLDCOV: 20.5 MM HG (ref 15–45)
RBC # BLD AUTO: 3.43 MILLION/UL (ref 3.81–5.12)
RBC # BLD AUTO: 3.68 MILLION/UL (ref 3.81–5.12)
RH BLD: POSITIVE
SAO2 % BLDCOV: 10.9 ML/DL
SPECIMEN EXPIRATION DATE: NORMAL
WBC # BLD AUTO: 13.11 THOUSAND/UL (ref 4.31–10.16)
WBC # BLD AUTO: 8.75 THOUSAND/UL (ref 4.31–10.16)

## 2021-03-18 PROCEDURE — 59514 CESAREAN DELIVERY ONLY: CPT | Performed by: STUDENT IN AN ORGANIZED HEALTH CARE EDUCATION/TRAINING PROGRAM

## 2021-03-18 PROCEDURE — 4A1HXCZ MONITORING OF PRODUCTS OF CONCEPTION, CARDIAC RATE, EXTERNAL APPROACH: ICD-10-PCS | Performed by: STUDENT IN AN ORGANIZED HEALTH CARE EDUCATION/TRAINING PROGRAM

## 2021-03-18 PROCEDURE — 10907ZC DRAINAGE OF AMNIOTIC FLUID, THERAPEUTIC FROM PRODUCTS OF CONCEPTION, VIA NATURAL OR ARTIFICIAL OPENING: ICD-10-PCS | Performed by: STUDENT IN AN ORGANIZED HEALTH CARE EDUCATION/TRAINING PROGRAM

## 2021-03-18 PROCEDURE — 86900 BLOOD TYPING SEROLOGIC ABO: CPT | Performed by: OBSTETRICS & GYNECOLOGY

## 2021-03-18 PROCEDURE — 86850 RBC ANTIBODY SCREEN: CPT | Performed by: OBSTETRICS & GYNECOLOGY

## 2021-03-18 PROCEDURE — 0UT70ZZ RESECTION OF BILATERAL FALLOPIAN TUBES, OPEN APPROACH: ICD-10-PCS | Performed by: STUDENT IN AN ORGANIZED HEALTH CARE EDUCATION/TRAINING PROGRAM

## 2021-03-18 PROCEDURE — 58611 LIGATE OVIDUCT(S) ADD-ON: CPT | Performed by: STUDENT IN AN ORGANIZED HEALTH CARE EDUCATION/TRAINING PROGRAM

## 2021-03-18 PROCEDURE — 88304 TISSUE EXAM BY PATHOLOGIST: CPT | Performed by: PATHOLOGY

## 2021-03-18 PROCEDURE — 3E033VJ INTRODUCTION OF OTHER HORMONE INTO PERIPHERAL VEIN, PERCUTANEOUS APPROACH: ICD-10-PCS | Performed by: STUDENT IN AN ORGANIZED HEALTH CARE EDUCATION/TRAINING PROGRAM

## 2021-03-18 PROCEDURE — NC001 PR NO CHARGE: Performed by: STUDENT IN AN ORGANIZED HEALTH CARE EDUCATION/TRAINING PROGRAM

## 2021-03-18 PROCEDURE — 85027 COMPLETE CBC AUTOMATED: CPT | Performed by: OBSTETRICS & GYNECOLOGY

## 2021-03-18 PROCEDURE — 88302 TISSUE EXAM BY PATHOLOGIST: CPT | Performed by: PATHOLOGY

## 2021-03-18 PROCEDURE — 85025 COMPLETE CBC W/AUTO DIFF WBC: CPT | Performed by: OBSTETRICS & GYNECOLOGY

## 2021-03-18 PROCEDURE — 82805 BLOOD GASES W/O2 SATURATION: CPT | Performed by: STUDENT IN AN ORGANIZED HEALTH CARE EDUCATION/TRAINING PROGRAM

## 2021-03-18 PROCEDURE — 3E0P7VZ INTRODUCTION OF HORMONE INTO FEMALE REPRODUCTIVE, VIA NATURAL OR ARTIFICIAL OPENING: ICD-10-PCS | Performed by: STUDENT IN AN ORGANIZED HEALTH CARE EDUCATION/TRAINING PROGRAM

## 2021-03-18 PROCEDURE — 86592 SYPHILIS TEST NON-TREP QUAL: CPT | Performed by: OBSTETRICS & GYNECOLOGY

## 2021-03-18 PROCEDURE — 86901 BLOOD TYPING SEROLOGIC RH(D): CPT | Performed by: OBSTETRICS & GYNECOLOGY

## 2021-03-18 RX ORDER — KETOROLAC TROMETHAMINE 30 MG/ML
30 INJECTION, SOLUTION INTRAMUSCULAR; INTRAVENOUS EVERY 6 HOURS SCHEDULED
Status: DISCONTINUED | OUTPATIENT
Start: 2021-03-19 | End: 2021-03-18

## 2021-03-18 RX ORDER — OXYCODONE HYDROCHLORIDE 5 MG/1
10 TABLET ORAL EVERY 4 HOURS PRN
Status: DISCONTINUED | OUTPATIENT
Start: 2021-03-19 | End: 2021-03-20 | Stop reason: HOSPADM

## 2021-03-18 RX ORDER — TRANEXAMIC ACID 100 MG/ML
INJECTION, SOLUTION INTRAVENOUS AS NEEDED
Status: DISCONTINUED | OUTPATIENT
Start: 2021-03-18 | End: 2021-03-18

## 2021-03-18 RX ORDER — MORPHINE SULFATE 0.5 MG/ML
INJECTION, SOLUTION EPIDURAL; INTRATHECAL; INTRAVENOUS AS NEEDED
Status: DISCONTINUED | OUTPATIENT
Start: 2021-03-18 | End: 2021-03-18

## 2021-03-18 RX ORDER — DIPHENHYDRAMINE HYDROCHLORIDE 50 MG/ML
25 INJECTION INTRAMUSCULAR; INTRAVENOUS EVERY 6 HOURS PRN
Status: ACTIVE | OUTPATIENT
Start: 2021-03-18 | End: 2021-03-19

## 2021-03-18 RX ORDER — ACETAMINOPHEN 325 MG/1
975 TABLET ORAL EVERY 6 HOURS SCHEDULED
Status: DISCONTINUED | OUTPATIENT
Start: 2021-03-19 | End: 2021-03-20 | Stop reason: HOSPADM

## 2021-03-18 RX ORDER — OXYTOCIN/RINGER'S LACTATE 30/500 ML
PLASTIC BAG, INJECTION (ML) INTRAVENOUS CONTINUOUS PRN
Status: DISCONTINUED | OUTPATIENT
Start: 2021-03-18 | End: 2021-03-18

## 2021-03-18 RX ORDER — MISOPROSTOL 200 UG/1
1000 TABLET ORAL ONCE
Status: COMPLETED | OUTPATIENT
Start: 2021-03-18 | End: 2021-03-18

## 2021-03-18 RX ORDER — IBUPROFEN 600 MG/1
600 TABLET ORAL EVERY 6 HOURS PRN
Status: DISCONTINUED | OUTPATIENT
Start: 2021-03-20 | End: 2021-03-18

## 2021-03-18 RX ORDER — ONDANSETRON 2 MG/ML
4 INJECTION INTRAMUSCULAR; INTRAVENOUS EVERY 8 HOURS PRN
Status: DISCONTINUED | OUTPATIENT
Start: 2021-03-18 | End: 2021-03-18

## 2021-03-18 RX ORDER — ACETAMINOPHEN 325 MG/1
650 TABLET ORAL EVERY 6 HOURS PRN
Status: DISCONTINUED | OUTPATIENT
Start: 2021-03-18 | End: 2021-03-20 | Stop reason: HOSPADM

## 2021-03-18 RX ORDER — ONDANSETRON 2 MG/ML
4 INJECTION INTRAMUSCULAR; INTRAVENOUS EVERY 4 HOURS PRN
Status: ACTIVE | OUTPATIENT
Start: 2021-03-18 | End: 2021-03-19

## 2021-03-18 RX ORDER — OXYTOCIN/RINGER'S LACTATE 30/500 ML
62.5 PLASTIC BAG, INJECTION (ML) INTRAVENOUS ONCE
Status: DISCONTINUED | OUTPATIENT
Start: 2021-03-18 | End: 2021-03-20 | Stop reason: HOSPADM

## 2021-03-18 RX ORDER — NALOXONE HYDROCHLORIDE 0.4 MG/ML
0.1 INJECTION, SOLUTION INTRAMUSCULAR; INTRAVENOUS; SUBCUTANEOUS
Status: ACTIVE | OUTPATIENT
Start: 2021-03-18 | End: 2021-03-19

## 2021-03-18 RX ORDER — OXYCODONE HYDROCHLORIDE 5 MG/1
5 TABLET ORAL EVERY 4 HOURS PRN
Status: DISCONTINUED | OUTPATIENT
Start: 2021-03-19 | End: 2021-03-20 | Stop reason: HOSPADM

## 2021-03-18 RX ORDER — SODIUM CHLORIDE, SODIUM LACTATE, POTASSIUM CHLORIDE, CALCIUM CHLORIDE 600; 310; 30; 20 MG/100ML; MG/100ML; MG/100ML; MG/100ML
125 INJECTION, SOLUTION INTRAVENOUS CONTINUOUS
Status: DISCONTINUED | OUTPATIENT
Start: 2021-03-18 | End: 2021-03-20 | Stop reason: HOSPADM

## 2021-03-18 RX ORDER — BUPIVACAINE HYDROCHLORIDE 7.5 MG/ML
INJECTION, SOLUTION INTRASPINAL AS NEEDED
Status: DISCONTINUED | OUTPATIENT
Start: 2021-03-18 | End: 2021-03-18

## 2021-03-18 RX ORDER — DIPHENHYDRAMINE HYDROCHLORIDE 50 MG/ML
25 INJECTION INTRAMUSCULAR; INTRAVENOUS EVERY 6 HOURS PRN
Status: DISCONTINUED | OUTPATIENT
Start: 2021-03-19 | End: 2021-03-19

## 2021-03-18 RX ORDER — ACETAMINOPHEN 325 MG/1
650 TABLET ORAL EVERY 6 HOURS
Status: DISCONTINUED | OUTPATIENT
Start: 2021-03-18 | End: 2021-03-18

## 2021-03-18 RX ORDER — DIAPER,BRIEF,INFANT-TODD,DISP
1 EACH MISCELLANEOUS DAILY PRN
Status: DISCONTINUED | OUTPATIENT
Start: 2021-03-18 | End: 2021-03-20 | Stop reason: HOSPADM

## 2021-03-18 RX ORDER — OXYCODONE HYDROCHLORIDE AND ACETAMINOPHEN 5; 325 MG/1; MG/1
1 TABLET ORAL EVERY 6 HOURS PRN
Status: DISPENSED | OUTPATIENT
Start: 2021-03-18 | End: 2021-03-19

## 2021-03-18 RX ORDER — CEFAZOLIN SODIUM 2 G/50ML
2000 SOLUTION INTRAVENOUS ONCE
Status: DISCONTINUED | OUTPATIENT
Start: 2021-03-18 | End: 2021-03-18

## 2021-03-18 RX ORDER — SODIUM CHLORIDE, SODIUM LACTATE, POTASSIUM CHLORIDE, CALCIUM CHLORIDE 600; 310; 30; 20 MG/100ML; MG/100ML; MG/100ML; MG/100ML
125 INJECTION, SOLUTION INTRAVENOUS CONTINUOUS
Status: DISCONTINUED | OUTPATIENT
Start: 2021-03-18 | End: 2021-03-18

## 2021-03-18 RX ORDER — MORPHINE SULFATE 0.5 MG/ML
INJECTION, SOLUTION EPIDURAL; INTRATHECAL; INTRAVENOUS
Status: COMPLETED
Start: 2021-03-18 | End: 2021-03-18

## 2021-03-18 RX ORDER — DEXAMETHASONE SODIUM PHOSPHATE 4 MG/ML
8 INJECTION, SOLUTION INTRA-ARTICULAR; INTRALESIONAL; INTRAMUSCULAR; INTRAVENOUS; SOFT TISSUE ONCE AS NEEDED
Status: ACTIVE | OUTPATIENT
Start: 2021-03-18 | End: 2021-03-19

## 2021-03-18 RX ORDER — METHYLERGONOVINE MALEATE 0.2 MG/ML
INJECTION INTRAVENOUS AS NEEDED
Status: DISCONTINUED | OUTPATIENT
Start: 2021-03-18 | End: 2021-03-18

## 2021-03-18 RX ORDER — METOCLOPRAMIDE HYDROCHLORIDE 5 MG/ML
5 INJECTION INTRAMUSCULAR; INTRAVENOUS EVERY 6 HOURS PRN
Status: ACTIVE | OUTPATIENT
Start: 2021-03-18 | End: 2021-03-19

## 2021-03-18 RX ORDER — SENNOSIDES 8.6 MG
1 TABLET ORAL DAILY
Status: DISCONTINUED | OUTPATIENT
Start: 2021-03-18 | End: 2021-03-20 | Stop reason: HOSPADM

## 2021-03-18 RX ORDER — FENTANYL CITRATE 50 UG/ML
INJECTION, SOLUTION INTRAMUSCULAR; INTRAVENOUS
Status: COMPLETED
Start: 2021-03-18 | End: 2021-03-18

## 2021-03-18 RX ORDER — ONDANSETRON 2 MG/ML
INJECTION INTRAMUSCULAR; INTRAVENOUS AS NEEDED
Status: DISCONTINUED | OUTPATIENT
Start: 2021-03-18 | End: 2021-03-18

## 2021-03-18 RX ORDER — ACETAMINOPHEN 325 MG/1
650 TABLET ORAL EVERY 6 HOURS
Status: DISPENSED | OUTPATIENT
Start: 2021-03-18 | End: 2021-03-19

## 2021-03-18 RX ORDER — CEFAZOLIN SODIUM 2 G/50ML
SOLUTION INTRAVENOUS AS NEEDED
Status: DISCONTINUED | OUTPATIENT
Start: 2021-03-18 | End: 2021-03-18

## 2021-03-18 RX ORDER — FENTANYL CITRATE 50 UG/ML
INJECTION, SOLUTION INTRAMUSCULAR; INTRAVENOUS AS NEEDED
Status: DISCONTINUED | OUTPATIENT
Start: 2021-03-18 | End: 2021-03-18

## 2021-03-18 RX ORDER — TRANEXAMIC ACID 100 MG/ML
INJECTION, SOLUTION INTRAVENOUS
Status: COMPLETED
Start: 2021-03-18 | End: 2021-03-18

## 2021-03-18 RX ORDER — ONDANSETRON 2 MG/ML
4 INJECTION INTRAMUSCULAR; INTRAVENOUS EVERY 8 HOURS PRN
Status: DISCONTINUED | OUTPATIENT
Start: 2021-03-19 | End: 2021-03-20 | Stop reason: HOSPADM

## 2021-03-18 RX ORDER — DOCUSATE SODIUM 100 MG/1
100 CAPSULE, LIQUID FILLED ORAL 2 TIMES DAILY
Status: DISCONTINUED | OUTPATIENT
Start: 2021-03-18 | End: 2021-03-20 | Stop reason: HOSPADM

## 2021-03-18 RX ORDER — CALCIUM CARBONATE 200(500)MG
1000 TABLET,CHEWABLE ORAL DAILY PRN
Status: DISCONTINUED | OUTPATIENT
Start: 2021-03-18 | End: 2021-03-20 | Stop reason: HOSPADM

## 2021-03-18 RX ADMIN — ONDANSETRON 4 MG: 2 INJECTION INTRAMUSCULAR; INTRAVENOUS at 12:00

## 2021-03-18 RX ADMIN — SODIUM CHLORIDE, SODIUM LACTATE, POTASSIUM CHLORIDE, AND CALCIUM CHLORIDE 125 ML/HR: .6; .31; .03; .02 INJECTION, SOLUTION INTRAVENOUS at 08:40

## 2021-03-18 RX ADMIN — CEFAZOLIN SODIUM 2000 MG: 2 SOLUTION INTRAVENOUS at 10:53

## 2021-03-18 RX ADMIN — FENTANYL CITRATE 15 MCG: 50 INJECTION, SOLUTION INTRAMUSCULAR; INTRAVENOUS at 11:07

## 2021-03-18 RX ADMIN — Medication 250 MILLI-UNITS/MIN: at 11:42

## 2021-03-18 RX ADMIN — SODIUM CHLORIDE, SODIUM LACTATE, POTASSIUM CHLORIDE, AND CALCIUM CHLORIDE 125 ML/HR: .6; .31; .03; .02 INJECTION, SOLUTION INTRAVENOUS at 16:49

## 2021-03-18 RX ADMIN — OXYCODONE HYDROCHLORIDE AND ACETAMINOPHEN 1 TABLET: 5; 325 TABLET ORAL at 14:18

## 2021-03-18 RX ADMIN — BUPIVACAINE HYDROCHLORIDE IN DEXTROSE 1.5 ML: 7.5 INJECTION, SOLUTION SUBARACHNOID at 11:07

## 2021-03-18 RX ADMIN — PHENYLEPHRINE HYDROCHLORIDE 100 MCG: 10 INJECTION INTRAVENOUS at 12:59

## 2021-03-18 RX ADMIN — DOCUSATE SODIUM 100 MG: 100 CAPSULE, LIQUID FILLED ORAL at 17:37

## 2021-03-18 RX ADMIN — PHENYLEPHRINE HYDROCHLORIDE 100 MCG: 10 INJECTION INTRAVENOUS at 11:10

## 2021-03-18 RX ADMIN — MORPHINE SULFATE 0.15 MG: 0.5 INJECTION, SOLUTION EPIDURAL; INTRATHECAL; INTRAVENOUS at 11:07

## 2021-03-18 RX ADMIN — PHENYLEPHRINE HYDROCHLORIDE 100 MCG: 10 INJECTION INTRAVENOUS at 11:23

## 2021-03-18 RX ADMIN — ACETAMINOPHEN 650 MG: 325 TABLET ORAL at 21:18

## 2021-03-18 RX ADMIN — MISOPROSTOL 1000 MCG: 200 TABLET ORAL at 13:16

## 2021-03-18 RX ADMIN — ACETAMINOPHEN 650 MG: 325 TABLET ORAL at 14:27

## 2021-03-18 RX ADMIN — SODIUM CHLORIDE, SODIUM LACTATE, POTASSIUM CHLORIDE, AND CALCIUM CHLORIDE 125 ML/HR: .6; .31; .03; .02 INJECTION, SOLUTION INTRAVENOUS at 09:45

## 2021-03-18 RX ADMIN — TRANEXAMIC ACID 1000 MG: 1 INJECTION, SOLUTION INTRAVENOUS at 12:45

## 2021-03-18 RX ADMIN — PHENYLEPHRINE HYDROCHLORIDE 100 MCG: 10 INJECTION INTRAVENOUS at 12:49

## 2021-03-18 RX ADMIN — METHYLERGONOVINE MALEATE 0.2 MG: 0.2 INJECTION, SOLUTION INTRAMUSCULAR; INTRAVENOUS at 12:51

## 2021-03-18 NOTE — DISCHARGE INSTRUCTIONS
Section   WHAT YOU SHOULD KNOW:   A  delivery, or , is abdominal surgery to deliver your baby  There are many reasons you may need a   · A  may be scheduled before labor if you had a  with your last baby  It may be scheduled if your baby is not positioned normally, or you are pregnant with more than 1 baby  · Your caregiver may perform an emergency  during labor to prevent life-threatening complications for you or your baby  A  may be done if your cervix does not dilate after several hours of active labor  · Other reasons for a  include maternal infections and problems with the placenta  AFTER YOU LEAVE:   Medicines:   · Prescription pain medicine  may be given  Ask how to take this medicine safely  · Acetaminophen  decreases pain and fever  It is available without a doctor's order  Ask how much to take and how often to take it  Follow directions  Acetaminophen can cause liver damage if not taken correctly  · NSAIDs  help decrease swelling and pain or fever  This medicine is available with or without a doctor's order  NSAIDs can cause stomach bleeding or kidney problems in certain people  If you take blood thinner medicine, always ask your obstetrician if NSAIDs are safe for you  Always read the medicine label and follow directions  · Take your medicine as directed  Contact your obstetrician (OB) if you think your medicine is not helping or if you have side effects  Tell him if you are allergic to any medicine  Keep a list of the medicines, vitamins, and herbs you take  Include the amounts, and when and why you take them  Bring the list or the pill bottles to follow-up visits  Carry your medicine list with you in case of an emergency  Follow up with your OB as directed: You may need to return to have your stitches or staples removed  Write down your questions so you remember to ask them during your visits    Wound care:  Carefully wash your wound with soap and water every day  Keep your wound clean and dry  Wear loose, comfortable clothes that do not rub against your wound  Ask your OB about bathing and showering  Drink plenty of liquids: You can lower your risk for a blood clot if you drink plenty of liquids  Ask how much liquid to drink each day and which liquids are best for you  Limit activity until you have fully recovered from surgery:   · Ask when it is safe for you to drive, walk up stairs, lift heavy objects, and have sex  · Ask when it is okay to exercise, and what types of exercise to do  Start slowly and do more as you get stronger  Contact your OB if:   · You have heavy vaginal bleeding that fills 1 or more sanitary pads in 1 hour  · You have a fever  · Your incision is swollen, red, or draining pus  · You have questions or concerns about yourself or your baby  Seek care immediately or call 911 if:   · Blood soaks through your bandage  · Your stitches come apart  · You feel lightheaded, short of breath, and have chest pain  · You cough up blood  · Your arm or leg feels warm, tender, and painful  It may look swollen and red  © 2014 [address] is for End User's use only and may not be sold, redistributed or otherwise used for commercial purposes  All illustrations and images included in CareNotes® are the copyrighted property of A D A M , Inc  or Mando Hein  The above information is an  only  It is not intended as medical advice for individual conditions or treatments  Talk to your doctor, nurse or pharmacist before following any medical regimen to see if it is safe and effective for you  Cesárea   LO QUE USTED DEBE SABER:   Un parto por cesárea es mehnaz cirugía abdominal que se realiza para extraer a barriga bebé  Existen muchas razones por las que usted podría necesitar mehnaz cesárea    · [employer] cesárea podría programarse antes del parto si usted tuvo otra cesárea con barriga último bebé  Ésta podría programarse si barriga bebé no está en mehnaz posición normal, o si usted está embarazada con más de 1 bebé  · Barriga médico podría realizar mehnaz cesárea de emergencia scar el parto para evitar complicaciones que pongan en riesgo barriga shirley o la del bebé  Mehnaz cesárea podría realizarse si barriga cerviz no se dilata después de varias horas de [place]  · Otras razones para realizar mehnaz cesárea incluyen infecciones maternales o con la placenta  DESPUÉS DE SER DADO DE JIMMY:   Medicamentos:   · Podrían recetarle medicamentos para el dolor  Pregunte cómo tomarse radha medicamento de [employer]  · El acetaminofeno  disminuye el dolor y la [place]  Está disponible sin receta médica  Pregunte la cantidad que debe skye y con que frecuencia  [address]  El acetaminofeno puede provocar daño al hígado si no se harris correctamente  · Los AINEs  ayudan a disminuir la inflamación y el dolor o la fiebre  Radha medicamento está disponible con o sin receta médica  Los AINEs puede provocar sangrado estomacal o problemas del Jeralyn Manners en ciertas personas  Si usted harris medicamentos anticoagulantes siempre  consulte con barriga ginecólogo si los AINEs son seguros para usted  Siempre kamari la etiqueta del medicamento y Jean-Baptiste Alicia instrucciones  · [place] barriga medicamento pearl se le indique  Comuníquese con barriga ginecólogo si usted piensa que barriga medicamento no lo está ayudando o si tiene efectos secundarios  Infórmele si usted es alérgico a cualquier medicamento  Mantenga mehnaz lista de los medicamentos, vitaminas y hierbas que harris  [address] cantidades, así pearl cuándo y por qué los harris  Traiga con usted la lista o los envases de los medicamentos a baljinder citas de seguimiento  Lleve consigo la lista del medicamento en dragan de mehnaz emergencia    Programe mehnaz kevin con barriga ginecólogo pearl se le indique:  Es posible que usted necesite regresar para que [place] puntos de sutura o las grapas  Escriba las preguntas que tenga para que recuerde hacerlas scar parisa citas  Cuidado de la herida:  [place] cuidadosamente barriga herida con Valeria Landskrishna y agua diariamente  Mantenga barriga Lisa Jim y seca  Use ropa suelta y cómoda que no roce la herida  Pregunte a barriga ginecólogo acerca de bañarse o ducharse  Ingiera suficientes líquidos:  Usted puede disminuir barriga riesgo de un coágulo de lelia si juanito suficientes líquidos  Pregunte cuánto líquido debe ingerir cada día y cuáles son los mejores para usted  Limite la actividad hasta que usted se recupere completamente de la cirugía:   · Pregunte cuando estará usted hábil para manejar, subir escaleras, levantar objetos pesados y tener relaciones sexuales  · Pregunte cuando es apropiado que ejercite, y qué tipos de ejercicios puede realizar  Comience lentamente y yee más conforme se sienta más anna  Comuníquese con barriga ginecólogo si:   · Usted presenta sangrado vaginal que empapa 1 toalla higiénica o más en 1 hora  · Usted tiene fiebre  · Barriga incisión está inflamada, enrojecida o drena pus  · Usted tiene preguntas o inquietudes acerca de usted o de barriga bebé  Busque atención médica inmediatamente o llame al 911 si:   · La lelia empapa barriga vendaje  · [place] puntos de sutura se desprenden  · Usted se siente mareado, con falta de aliento y tiene dolor en el pecho  · Usted tose lelia  · Barriga brazo o pierna se sienten calientes, sensibles, y dolorosos  Se podría natanael inflamado y bolton  · Usted tiene sangrado vaginal abundante (usted empapa mehnaz toalla sanitaria en 1 a 2 horas consecutivas)  © 2014 [address] is for End User's use only and may not be sold, redistributed or otherwise used for commercial purposes  All illustrations and images included in CareNotes® are the copyrighted property of A D A M , Inc  or Mando Hein  Esta información es sólo para uso en educación   Barriga intención no es darle un consejo médico sobre enfermedades o tratamientos  Colsulte con barriga [employer] farmacéutico antes de seguir cualquier régimen médico para saber si es seguro y efectivo para usted

## 2021-03-18 NOTE — PLAN OF CARE
Problem: Knowledge Deficit  Goal: Verbalizes understanding of labor plan  Description: Assess patient/family/caregiver's baseline knowledge level and ability to understand information  Provide education via patient/family/caregiver's preferred learning method at appropriate level of understanding  1  Provide teaching at level of understanding  2  Provide teaching via preferred learning method(s)    Outcome: Completed     Problem: BIRTH - VAGINAL/ SECTION  Goal: Fetal and maternal status remain reassuring during the birth process  Description: INTERVENTIONS:  - Monitor vital signs  - Monitor fetal heart rate  - Monitor uterine activity  - Monitor labor progression (vaginal delivery)  - DVT prophylaxis  - Antibiotic prophylaxis  Outcome: Completed  Goal: Emotionally satisfying birthing experience for mother/fetus  Description: Interventions:  - Assess, plan, implement and evaluate the nursing care given to the patient in labor  - Advocate the philosophy that each childbirth experience is a unique experience and support the family's chosen level of involvement and control during the labor process   - Actively participate in both the patient's and family's teaching of the birth process  - Consider cultural, Episcopalian and age-specific factors and plan care for the patient in labor  Outcome: Completed     Problem: POSTPARTUM  Goal: Experiences normal postpartum course  Description: INTERVENTIONS:  - Monitor maternal vital signs  - Assess uterine involution and lochia  Outcome: Progressing  Goal: Appropriate maternal -  bonding  Description: INTERVENTIONS:  - Identify family support  - Assess for appropriate maternal/infant bonding   -Encourage maternal/infant bonding opportunities  - Referral to  or  as needed  Outcome: Progressing  Goal: Establishment of infant feeding pattern  Description: INTERVENTIONS:  - Assess breast/bottle feeding  - Refer to lactation as needed  Outcome: Progressing  Goal: Incision(s), wounds(s) or drain site(s) healing without S/S of infection  Description: INTERVENTIONS  - Assess and document risk factors for skin impairment   - Assess and document dressing, incision, wound bed, drain sites and surrounding tissue  - Consider nutrition services referral as needed  - Oral mucous membranes remain intact  - Provide patient/ family education  Outcome: Progressing     Problem: PAIN - ADULT  Goal: Verbalizes/displays adequate comfort level or baseline comfort level  Description: Interventions:  - Encourage patient to monitor pain and request assistance  - Assess pain using appropriate pain scale  - Administer analgesics based on type and severity of pain and evaluate response  - Implement non-pharmacological measures as appropriate and evaluate response  - Consider cultural and social influences on pain and pain management  - Notify physician/advanced practitioner if interventions unsuccessful or patient reports new pain  Outcome: Progressing     Problem: INFECTION - ADULT  Goal: Absence or prevention of progression during hospitalization  Description: INTERVENTIONS:  - Assess and monitor for signs and symptoms of infection  - Monitor lab/diagnostic results  - Monitor all insertion sites, i e  indwelling lines, tubes, and drains  - Monitor endotracheal if appropriate and nasal secretions for changes in amount and color  - [place] appropriate cooling/warming therapies per order  - Administer medications as ordered  - Instruct and encourage patient and family to use good hand hygiene technique  - Identify and instruct in appropriate isolation precautions for identified infection/condition  Outcome: Progressing  Goal: Absence of fever/infection during neutropenic period  Description: INTERVENTIONS:  - Monitor WBC    Outcome: Progressing     Problem: SAFETY ADULT  Goal: Patient will remain free of falls  Description: INTERVENTIONS:  - Assess patient frequently for physical needs  -  Identify cognitive and physical deficits and behaviors that affect risk of falls    -  [place] fall precautions as indicated by assessment   - Educate patient/family on patient safety including physical limitations  - Instruct patient to call for assistance with activity based on assessment  - Modify environment to reduce risk of injury  - Consider OT/PT consult to assist with strengthening/mobility  Outcome: Progressing  Goal: Maintain or return to baseline ADL function  Description: INTERVENTIONS:  -  Assess patient's ability to carry out ADLs; assess patient's baseline for ADL function and identify physical deficits which impact ability to perform ADLs (bathing, care of mouth/teeth, toileting, grooming, dressing, etc )  - Assess/evaluate cause of self-care deficits   - Assess range of motion  - Assess patient's mobility; develop plan if impaired  - Assess patient's need for assistive devices and provide as appropriate  - Encourage maximum independence but intervene and supervise when necessary  - Involve family in performance of ADLs  - Assess for home care needs following discharge   - Consider OT consult to assist with ADL evaluation and planning for discharge  - Provide patient education as appropriate  Outcome: Progressing  Goal: Maintain or return mobility status to optimal level  Description: INTERVENTIONS:  - Assess patient's baseline mobility status (ambulation, transfers, stairs, etc )    - Identify cognitive and physical deficits and behaviors that affect mobility  - Identify mobility aids required to assist with transfers and/or ambulation (gait belt, sit-to-stand, lift, walker, cane, etc )  - [place] fall precautions as indicated by assessment  - Record patient progress and toleration of activity level on Mobility SBAR; progress patient to next Phase/Stage  - Instruct patient to call for assistance with activity based on assessment  - Consider rehabilitation consult to assist with strengthening/weightbearing, etc   Outcome: Progressing     Problem: Knowledge Deficit  Goal: Patient/family/caregiver demonstrates understanding of disease process, treatment plan, medications, and discharge instructions  Description: Complete learning assessment and assess knowledge base    Interventions:  - Provide teaching at level of understanding  - Provide teaching via preferred learning methods  Outcome: Progressing     Problem: DISCHARGE PLANNING  Goal: Discharge to home or other facility with appropriate resources  Description: INTERVENTIONS:  - Identify barriers to discharge w/patient and caregiver  - Arrange for needed discharge resources and transportation as appropriate  - Identify discharge learning needs (meds, wound care, etc )  - Arrange for interpretive services to assist at discharge as needed  - Refer to Case Management Department for coordinating discharge planning if the patient needs post-hospital services based on physician/advanced practitioner order or complex needs related to functional status, cognitive ability, or social support system  Outcome: Progressing

## 2021-03-18 NOTE — LACTATION NOTE
This note was copied from a baby's chart  Met with mother  Provided mother with [place] Ready, Set, Baby booklet  Discussed Skin to Skin contact an benefits to mom and baby  Talked about the delay of the first bath until baby has adjusted  Spoke about the benefits of rooming in  Feeding on cue and what that means for recognizing infant's hunger  Avoidance of pacifiers for the first month discussed  Talked about exclusive breastfeeding for the first 6 months  Positioning and latch reviewed as well as showing images of other feeding positions  Discussed the properties of a good latch in any position  Reviewed hand/manual expression  Discussed s/s that baby is getting enough milk and some s/s that breastfeeding dyad may need further help  Gave information on common concerns, what to expect the first few weeks after delivery, preparing for other caregivers, and how partners can help  Resources for support also provided  Mother verbalized breastfeeding is going well  Enc to call for assistance as needed,phone # given

## 2021-03-18 NOTE — PLAN OF CARE
Problem: Knowledge Deficit  Goal: Verbalizes understanding of labor plan  Description: Assess patient/family/caregiver's baseline knowledge level and ability to understand information  Provide education via patient/family/caregiver's preferred learning method at appropriate level of understanding  1  Provide teaching at level of understanding  2  Provide teaching via preferred learning method(s)    Outcome: Progressing     Problem: BIRTH - VAGINAL/ SECTION  Goal: Fetal and maternal status remain reassuring during the birth process  Description: INTERVENTIONS:  - Monitor vital signs  - Monitor fetal heart rate  - Monitor uterine activity  - Monitor labor progression (vaginal delivery)  - DVT prophylaxis  - Antibiotic prophylaxis  Outcome: Progressing  Goal: Emotionally satisfying birthing experience for mother/fetus  Description: Interventions:  - Assess, plan, implement and evaluate the nursing care given to the patient in labor  - Advocate the philosophy that each childbirth experience is a unique experience and support the family's chosen level of involvement and control during the labor process   - Actively participate in both the patient's and family's teaching of the birth process  - Consider cultural, Anglican and age-specific factors and plan care for the patient in labor  Outcome: Progressing

## 2021-03-18 NOTE — ANESTHESIA PROCEDURE NOTES
Spinal Block    Patient location during procedure: OB  Start time: 3/18/2021 11:07 AM  Reason for block: at surgeon's request and primary anesthetic  Staffing  Anesthesiologist: Walter Cedeno DO  Performed: anesthesiologist   Preanesthetic Checklist  Completed: patient identified, site marked, surgical consent, pre-op evaluation, timeout performed, IV checked, risks and benefits discussed and monitors and equipment checked  Spinal Block  Patient position: sitting  Prep: Betadine  Patient monitoring: heart rate, continuous pulse ox and frequent blood pressure checks  Approach: midline  Location: L4-5  Injection technique: single-shot  Needle  Needle type: pencil-tip   Needle gauge: 22 G  Needle length: 5 cm  Assessment  Sensory level: T4  Injection Assessment:  negative aspiration for heme, no paresthesia on injection and positive aspiration for clear CSF    Post-procedure:  site cleaned

## 2021-03-18 NOTE — OP NOTE
OPERATIVE REPORT  PATIENT NAME: Luisa Vides    :  1985  MRN: [id number]  Pt Location: [facility] L&D OR ROOM 01     Section Procedure Note    Indications:   Prior  section x3  Scheduled  delivery    Pre-operative Diagnosis:   39w1d pregnancy  Advanced maternal age    Post-operative Diagnosis:   RLTCS and bilateral salpingectomy    Attending: Josh Hylton MD  Resident: Ac Paez DO    Maternal Findings:  Grossly dense adhesions between the rectus muscle and the lower uterine segment obscuring the bladder, this could complicate future hysterectomy  Bladder was backfilled with sterile milk without spillage or evidence of bladder injury intraoperatively  Normal tubes and ovaries bilaterally     Findings:  Viable female "Tracie" weighing 7lbs 9 7oz;  Apgar scores of 8 at one minute and 9 at five minutes  Clear amniotic fluid  Normal placenta with 3-vessel cord    Arterial and Venous Gases:  Umbilical Cord Venous Blood Gas:  Results from last 7 days   Lab Units 21  1024   PH COV  7 322   PCO2 COV mm HG 52 6*   HCO3 COV mmol/L 26 6   BASE EXC COV mmol/L -0 5*   O2 CT CD VB mL/dL 10 9   O2 HGB, VENOUS CORD % 69 7     Umbilical Cord Arterial Blood Gas:  Results from last 7 days   Lab Units 21  1024   PH COA  7 243   PCO2 COA  68 7*   PO2 COA mm HG 14 2   HCO3 COA mmol/L 29 0*   BASE EXC COA mmol/L -0 6*   O2 CONTENT CORD ART ml/dl 5 4   O2 HGB, ARTERIAL CORD % 23 4       Specimens: Arterial and venous cord gases, cord blood, segment of umbilical cord, placenta to storage    Quantitative Blood Loss: 2126 mL    Fluids: 2 L LR    Drains: Campos catheter           Complications:  None; patient tolerated the procedure well  Disposition: PACU            Condition: stable    Procedure Details   The patient was seen prior to the procedure   Risks, benefits, possible complications, alternate treatment options, and expected outcomes were discussed with the patient  The patient agreed with the proposed plan and gave informed consent for a repeat  section with permanent sterilization  The patient was taken to the [facility] Operating Room where she received spinal anesthesia  For infection prophylaxis, she received 2g ancef IV preoperatively  Fetal heart tones in the OR were assessed and noted to be within normal limits and a Campos catheter and SCDs were placed  The abdomen was prepped with Chloraprep, the vagina was prepped with Betadine, and following appropriate drying time, the patient was draped in the usual sterile manner  A Time Out was held and the above information confirmed  The patient was identified as Nilton Alarcon and the procedure verified as a  Delivery for repeat   A Pfannenstiel incision was made and carried down through the underlying subcutaneous tissue to the fascia using a scalpel initially and then Bovie cautery  The rectus fascia was then nicked in the midline and dissected laterally using the Bovie  The superior edge of the  fascial incision was grasped with Kocher clamps bilaterally, tented upward and the underlying rectus muscles were dissected off bluntly  The rectus muscles were noted to be densely adherent to the uterine serosa  Two Kocher clamps were used to grasp the lower rectus muscles and elevated to help define the uterine serosa  This was sharply  with Metzenbaum scissors  The bladder blade was unable to be placed due to adhesions obscuring gross identification of the bladder  A low transverse uterine incision was made with the scalpel  The amnion was intact at this point  Bandage scissors were used to bilaterally lengthen the hysterotomy taking care to avoid rupture the amnion  The amnion was then entered bluntly for clear fluid  The fetal head was palpated, elevated, and delivered through the uterine incision followed by the body without difficulty   Time of birth was noted at 1142  There was noted to be spontaneous cry and good tone  There was no apparent injury to the   The umbilical cord was doubly clamped and cut after 60 seconds to allow for delayed cord clamping  The infant was handed off to the  providers  Arterial and venous cord gases, cord blood, and a segment of umbilical cord were obtained for evaluation  The placenta delivered spontaneously at [phone] with uterine fundal massage and appeared normal  The uterus was initially left in situ for repair  The uterine incision was closed with a running locked suture of 0 Vicryl  A second layer of the same suture was used to imbricate the first   There was noted to be bleeding from the right angle of the hysterotomy  Additional figure 8 sutures with 0-vicryl were thrown and hemostasis was noted  At this time sterile milk was used to backfill the bladder  No leakage of milk was noted after 400cc  This fluid was then drained as the bladder was noted to be intact  Urine was clear and no hematuria was noted  Attention was then turned to the sterilization part of the procedure  The uterus was delivered through the laparotomy  A avis was used to grasp the left fallopian tube  Three windows in the mesosalpinx were made and 0-plain was used to ligate the vessels within the mesosalpinx  Then the fallopian tube was dissected off using a metzenbaum scissor  The tube was ligated approximately 2cm from the right cornua with the same suture and amputated  A small right paratubal cyst was noted and sent with the specimen  Hemostasis was noted  The same procedure was performed on the right side and both tubes were sent to pathology  Hemostasis was noted bilaterally from the tubal sites  The uterus was returned to the abdomen  There was noted to be brisk bleeding from the serosa along the engorged left adnexal vessels   This site was gently grasped with a forecep, tented up and cauterized with Bovie electrocautery  Hemostasis was noted  The uterus was then gently placed fully within the abdomen without difficulty  The paracolic gutters were inspected and cleared of all clots and debris with irrigation and moist lap sponges  Both tubal sites appeared hemostatic  There was noted to be brisk bleeding again along the right angle of the hysterotomy  Additional 0-vicryl sutures were thrown and hemostasis was achieved  The fascia was closed with a running suture of 0 Vicryl  Subcutaneous adipose tissue was closed with a running suture of 2-0 Plain gut  The skin was closed with a subcuticular running suture of 4-0 Stratafix  Telfa and an ABD were applied  The patient appeared to tolerate the procedure very well  Lap sponge, needle, and instrument counts were correct x2  The patient's fundus was palpated and the uterus was expressed  She was then cleaned and transferred to her postpartum recovery room in stable condition and her infant went to the  nursery  Attending Attestation: Dr Christie Trujillo MD was present for the entire procedure      Dl Mckeon DO  PGY-4 OB/GYN   3/18/2021 2:04 PM

## 2021-03-18 NOTE — H&P
H&P Exam - Obstetrics   Jalen Mazno 38 y o  female MRN: [id number]  Unit/Bed#: L&D 329-01 Encounter: [id number]      ASSESSMENT:  44yo  at 39w1d weeks gestation who is being admitted for a RLTCS and desire for permanent sterilization or IUD placement if tubal is not a viable option given prior scar tissue  EFW: 73% on     PLAN:    Pregnancy at 36w3d  Admit for a RLTCS and BS or tubal ligation, possible Mirena IUD placement   Follow up CBC, RPR, Blood Type  Plan for spinal anesthesia  Ancef 2g IV for surgical prophylaxis     Discussed benefits, risks and alternatives of repeat  section with bilateral salpingectomy or tubal ligation  She is aware of the increased risks of bladder, bowel, ureters, need for blood transfusion, risk of hysterectomy to control bleeding, she is willing to accept blood and all intervention necessary to preserve her life including intubation and resuscitations  She is aware that if scar tissue precludes the ability to perform bilateral tubal sterilization that the Mirena IUD may be a viable option  This was reviewed with her including the effective rate of 99% for contraception, 6 year effective period, risk of pregnancy, device migration, expulsion rate up to 12%, need for surgical removal of the device, and worsening menses or abdominal pain  Discussed with Dr Magan Jaime       This patient will be an INPATIENT  and I certify the anticipated length of stay is >2 Midnights  History of Present Illness     Chief Complaint:     HPI:  Juany Guzman is a 38 y o   female with an BIENVENIDO of 3/24/2021, by Last Menstrual Period at 39w1d weeks gestation who is being admitted for a RLTCS  She has had three prior c-sections in 2004, , and  and they were all in the [facility]       Contractions: none  Loss of fluid: none  Vaginal bleeding: none  Fetal movement: present    She is a [employer] patient     PREGNANCY COMPLICATIONS:   1  Prior  x3  2  AMA  3  Pyelonephritis during pregnancy   4  Abnormal 1 hr GTT of 152, normal 3 hr GTT  5  H/o chlamydia during pregnancy, two subsequent negative tests, most recent   6  H/o gastric sleeve in 2018    OB History    Para Term  AB Living   4 3 3 0 0 3   SAB TAB Ectopic Multiple Live Births   0 0 0 0 3      # Outcome Date GA Lbr Darian/2nd Weight Sex Delivery Anes PTL Lv   4 Current            3 Term 11/09/10 39w0d  3629 g (8 lb) M CS-Unspec Spinal N FRANCI      Birth Comments: FOB1   2 Term 06 39w0d  3884 g (8 lb 9 oz) M CS-Unspec Spinal N FRANCI      Birth Comments: FOB1   1 Term 04 40w0d  3374 g (7 lb 7 oz) F CS-Unspec Spinal N FRANCI      Birth Comments: FOB1      Complications: Fetal Intolerance       Baby complications/comments: None     Review of Systems   Constitutional: Negative for fatigue and fever  Respiratory: Negative for cough, shortness of breath and wheezing  Gastrointestinal: Negative for diarrhea, nausea and vomiting  Genitourinary: Negative for flank pain, genital sores, hematuria, vaginal bleeding and vaginal discharge  Musculoskeletal: Positive for back pain           Historical Information   Past Medical History:   Diagnosis Date    34 weeks gestation of pregnancy 2021    No known health problems      Past Surgical History:   Procedure Laterality Date     SECTION  , ,     SLEEVE GASTROPLASTY  2018     Social History   Social History     Substance and Sexual Activity   Alcohol Use Yes    Frequency: Monthly or less    Drinks per session: 1 or 2     Social History     Substance and Sexual Activity   Drug Use Never     Social History     Tobacco Use   Smoking Status Never Smoker   Smokeless Tobacco Never Used     Family History: non-contributory    Meds/Allergies      Medications Prior to Admission   Medication    cholecalciferol (VITAMIN D3) 1,000 units tablet    hydrocortisone 1 % cream    nitrofurantoin (MACROBID) 100 mg capsule    Prenatal Vit-Fe Fumarate-FA (Prenatal Vitamin) 27-0 8 MG TABS      No Known Allergies    OBJECTIVE:    Vitals: Last menstrual period 06/17/2020, unknown if currently breastfeeding  There is no height or weight on file to calculate BMI  Physical Exam  Vitals signs reviewed  Constitutional:       Appearance: She is well-developed  HENT:      Head: Normocephalic and atraumatic  Nose: Nose normal    Eyes:      Pupils: Pupils are equal, round, and reactive to light  Neck:      Musculoskeletal: Normal range of motion  Vascular: No JVD  Cardiovascular:      Rate and Rhythm: Normal rate and regular rhythm  Heart sounds: Normal heart sounds  No murmur  No friction rub  No gallop  Pulmonary:      Effort: Pulmonary effort is normal  No respiratory distress  Breath sounds: Normal breath sounds  No stridor  No wheezing or rales  Abdominal:      General: Bowel sounds are normal  There is no distension  Palpations: Abdomen is soft  Tenderness: There is no abdominal tenderness  There is no guarding or rebound  Comments: gravid   Musculoskeletal: Normal range of motion  Skin:     General: Skin is warm and dry  Coloration: Skin is not pale  Findings: No rash  Neurological:      Mental Status: She is alert and oriented to person, place, and time  Psychiatric:         Behavior: Behavior normal          Thought Content:  Thought content normal          Judgment: Judgment normal            Fetal heart rate:    120 BPM, reactive with moderate variability, no decelerations, reactive 15 x 15 accelerations     [place]:    Occasional     GBS: Negative     Prenatal Labs:   Blood Type:   Lab Results   Component Value Date/Time    ABO Grouping O 09/17/2020 01:15 PM     , D (Rh type):   Lab Results   Component Value Date/Time    Rh Factor Positive 09/17/2020 01:15 PM      HCT/HGB:   Lab Results   Component Value Date/Time    Hematocrit 34 1 (L) 03/18/2021 09:07 AM    Hemoglobin 11 0 (L) 03/18/2021 09:07 AM      , MCV:   Lab Results   Component Value Date/Time    MCV 93 03/18/2021 09:07 AM      , Platelets:   Lab Results   Component Value Date/Time    Platelets 433 91/32/8221 09:07 AM      , 1 hour Glucola:   Lab Results   Component Value Date/Time    Glucose 152 (H) 01/21/2021 11:04 AM   , 3 hour GTT:   Lab Results   Component Value Date/Time    Glucose, GTT - 3 Hour 54 01/22/2021 01:57 PM      Rubella:   Lab Results   Component Value Date/Time    Rubella IgG Quant >175 0 09/17/2020 01:15 PM        , VDRL/RPR:   Lab Results   Component Value Date/Time    RPR Non-Reactive 01/21/2021 11:04 AM      , Urine Culture/Screen:   Lab Results   Component Value Date/Time    Urine Culture 30,000-39,000 cfu/ml Klebsiella pneumoniae (A) 01/27/2021 11:19 AM    Urine Culture <10,000 cfu/ml  01/27/2021 11:19 AM     Hep B:   Lab Results   Component Value Date/Time    Hepatitis B Surface Ag Non-reactive 09/17/2020 01:15 PM    Hep C: Negative    HIV:   Lab Results   Component Value Date/Time    HIV-1/HIV-2 Ab Non-Reactive 09/17/2020 01:15 PM    Chlamydia: Negative 2/25/21   Gonorrhea:   Lab Results   Component Value Date/Time    N gonorrhoeae, DNA Probe Negative 02/25/2021 04:31 PM     , Group B Strep:    Lab Results   Component Value Date/Time    Strep Grp B PCR Negative 02/25/2021 04:32 PM          Invasive Devices     None                   Tony Rice DO  PGY-4 OB/GYN  3/18/2021 10:26 AM

## 2021-03-18 NOTE — ANESTHESIA PREPROCEDURE EVALUATION
Procedure:   SECTION () REPEAT (N/A Uterus)  LIGATION/COAGULATION TUBAL (N/A Abdomen)    Relevant Problems   ANESTHESIA (within normal limits)      CARDIO (within normal limits)      ENDO (within normal limits)      GI/HEPATIC (within normal limits)      /RENAL (within normal limits)      GYN   (+) 39 weeks gestation of pregnancy   (+) AMA      HEMATOLOGY (within normal limits)      MUSCULOSKELETAL (within normal limits)      NEURO/PSYCH (within normal limits)      PULMONARY (within normal limits)        Physical Exam    Airway    Mallampati score: III         Dental   No notable dental hx     Cardiovascular  Rhythm: regular, Rate: normal, Cardiovascular exam normal    Pulmonary  Breath sounds clear to auscultation,     Other Findings        Anesthesia Plan  ASA Score- 2     Anesthesia Type- spinal with ASA Monitors  Additional Monitors:   Airway Plan:           Plan Factors-Exercise tolerance (METS): >4 METS  Chart reviewed  Existing labs reviewed  Patient summary reviewed  Patient is not a current smoker  Patient not instructed to abstain from smoking on day of procedure  Patient did not smoke on day of surgery  Induction-     Postoperative Plan- Plan for postoperative opioid use  Informed Consent- Anesthetic plan and risks discussed with patient and spouse

## 2021-03-18 NOTE — DISCHARGE SUMMARY
Discharge Summary - OB/GYN   James Manzo 38 y o  female MRN: [id number]  Unit/Bed#: LD OR  Encounter: [id number]      Admission Date: 3/18/2021     Discharge Date: 3/20/201    Admitting Diagnosis:   1  Pregnancy at 39w1d  2  Pyelonephritis in pregnancy, on suppression  3  H/o  section x3      Discharge Diagnosis:   Same, delivered    Procedures: primary  section, low transverse incision, bilateral salpingectomy    Admitting and Delivery Attending: Stefan Riedel, MD  Discharge Attending: Dr Indra Warner:     Yvonne Goins is a 38 y o  [id number] at 39w1d wks who was initially admitted for a repeat  and bilateral salpingectomy  She delivered a viable female  on 3/18/2021 at [phone]  Weight 7lbs 9 7oz via repeat  section, low transverse incision  Apgars were 8 (1 min) and 9 (5 min)   was transferred to  nursery  Patient tolerated the procedure well and was transferred to recovery in stable condition  Her post-operative course was uncomplicated  Preoperative hemoglobin was 11, postoperative was 8 4 and she received one dose of Venofer  Her postoperative pain was well controlled with oral analgesics  On day of discharge, she was ambulating and able to reasonably perform all ADLs  She was voiding and had appropriate bowel function  Pain was well controlled  She was discharged home on post-operative day #2 without complications  Patient was instructed to follow up with her OB as an outpatient and was given appropriate warnings to call provider if she develops signs of infection or uncontrolled pain  Complications: none apparent    Condition at discharge: good     Discharge instructions/Information to patient and family:   See after visit summary for information provided to patient and family        Provisions for Follow-Up Care:  See after visit summary for information related to follow-up care and any pertinent home health orders  Disposition: Home    Planned Readmission: No    Discharge Medications: For a complete list of the patient's medications, please refer to her med rec

## 2021-03-19 LAB
BASOPHILS # BLD AUTO: 0.03 THOUSANDS/ΜL (ref 0–0.1)
BASOPHILS NFR BLD AUTO: 0 % (ref 0–1)
EOSINOPHIL # BLD AUTO: 0.04 THOUSAND/ΜL (ref 0–0.61)
EOSINOPHIL NFR BLD AUTO: 0 % (ref 0–6)
ERYTHROCYTE [DISTWIDTH] IN BLOOD BY AUTOMATED COUNT: 13.1 % (ref 11.6–15.1)
ERYTHROCYTE [DISTWIDTH] IN BLOOD BY AUTOMATED COUNT: 13.2 % (ref 11.6–15.1)
HCT VFR BLD AUTO: 26.2 % (ref 34.8–46.1)
HCT VFR BLD AUTO: 26.7 % (ref 34.8–46.1)
HGB BLD-MCNC: 8.4 G/DL (ref 11.5–15.4)
HGB BLD-MCNC: 8.5 G/DL (ref 11.5–15.4)
IMM GRANULOCYTES # BLD AUTO: 0.07 THOUSAND/UL (ref 0–0.2)
IMM GRANULOCYTES NFR BLD AUTO: 1 % (ref 0–2)
LYMPHOCYTES # BLD AUTO: 1.53 THOUSANDS/ΜL (ref 0.6–4.47)
LYMPHOCYTES NFR BLD AUTO: 13 % (ref 14–44)
MCH RBC QN AUTO: 29.4 PG (ref 26.8–34.3)
MCH RBC QN AUTO: 29.8 PG (ref 26.8–34.3)
MCHC RBC AUTO-ENTMCNC: 31.8 G/DL (ref 31.4–37.4)
MCHC RBC AUTO-ENTMCNC: 32.1 G/DL (ref 31.4–37.4)
MCV RBC AUTO: 92 FL (ref 82–98)
MCV RBC AUTO: 93 FL (ref 82–98)
MONOCYTES # BLD AUTO: 1.06 THOUSAND/ΜL (ref 0.17–1.22)
MONOCYTES NFR BLD AUTO: 9 % (ref 4–12)
NEUTROPHILS # BLD AUTO: 9.49 THOUSANDS/ΜL (ref 1.85–7.62)
NEUTS SEG NFR BLD AUTO: 77 % (ref 43–75)
NRBC BLD AUTO-RTO: 0 /100 WBCS
PLATELET # BLD AUTO: 190 THOUSANDS/UL (ref 149–390)
PLATELET # BLD AUTO: 197 THOUSANDS/UL (ref 149–390)
PMV BLD AUTO: 10.8 FL (ref 8.9–12.7)
PMV BLD AUTO: 10.9 FL (ref 8.9–12.7)
RBC # BLD AUTO: 2.82 MILLION/UL (ref 3.81–5.12)
RBC # BLD AUTO: 2.89 MILLION/UL (ref 3.81–5.12)
RPR SER QL: NORMAL
WBC # BLD AUTO: 11.5 THOUSAND/UL (ref 4.31–10.16)
WBC # BLD AUTO: 12.22 THOUSAND/UL (ref 4.31–10.16)

## 2021-03-19 PROCEDURE — 85025 COMPLETE CBC W/AUTO DIFF WBC: CPT | Performed by: OBSTETRICS & GYNECOLOGY

## 2021-03-19 PROCEDURE — 85027 COMPLETE CBC AUTOMATED: CPT | Performed by: OBSTETRICS & GYNECOLOGY

## 2021-03-19 PROCEDURE — 99024 POSTOP FOLLOW-UP VISIT: CPT | Performed by: OBSTETRICS & GYNECOLOGY

## 2021-03-19 RX ORDER — DIPHENHYDRAMINE HCL 25 MG
25 TABLET ORAL EVERY 6 HOURS PRN
Status: DISCONTINUED | OUTPATIENT
Start: 2021-03-19 | End: 2021-03-20 | Stop reason: HOSPADM

## 2021-03-19 RX ADMIN — OXYCODONE HYDROCHLORIDE AND ACETAMINOPHEN 1 TABLET: 5; 325 TABLET ORAL at 05:06

## 2021-03-19 RX ADMIN — OXYCODONE HYDROCHLORIDE 10 MG: 5 TABLET ORAL at 12:31

## 2021-03-19 RX ADMIN — SENNOSIDES 8.6 MG: 8.6 TABLET ORAL at 09:29

## 2021-03-19 RX ADMIN — DOCUSATE SODIUM 100 MG: 100 CAPSULE, LIQUID FILLED ORAL at 17:54

## 2021-03-19 RX ADMIN — ACETAMINOPHEN 975 MG: 325 TABLET ORAL at 12:32

## 2021-03-19 RX ADMIN — ACETAMINOPHEN 975 MG: 325 TABLET ORAL at 17:54

## 2021-03-19 RX ADMIN — OXYCODONE HYDROCHLORIDE 5 MG: 5 TABLET ORAL at 19:56

## 2021-03-19 RX ADMIN — IRON SUCROSE 300 MG: 20 INJECTION, SOLUTION INTRAVENOUS at 12:20

## 2021-03-19 RX ADMIN — DOCUSATE SODIUM 100 MG: 100 CAPSULE, LIQUID FILLED ORAL at 09:29

## 2021-03-19 NOTE — PROGRESS NOTES
Progress Note - OB/GYN  Vicki Manzo 38 y o  female MRN: [id number]  Unit/Bed#: L&D 306-01 Encounter: [id number]      Max Lizama is a patient of [facility]    Subjective/Objective     Chief Complaint: Postoperative State     Subjective:  Patient is s/p RLTCS and bilateral salpingectomy   She is POD# 1  Her pain is well controlled  Her UOP is adequate and romero removed this AM for void trial  Her incision is C/D/I  She is recovering well and is stable  Pain: no  Tolerating Oral Intake: yes  Voiding: yes  Flatus: yes  Bowel Movement: no  Ambulating: no  Breastfeeding: Breastfeeding  Chest Pain: no  Shortness of Breath: no  Leg Pain/Discomfort: no  Lochia: moderate    Vitals:   BP (!) 89/64 (BP Location: Left arm) Comment: Dr Hal Moura aware  Pulse 80   Temp (!) 97 4 °F (36 3 °C) (Temporal)   Resp 18   LMP 06/17/2020   SpO2 97%   Breastfeeding Yes Comment: and bottle       Intake/Output Summary (Last 24 hours) at 3/19/2021 0554  Last data filed at 3/19/2021 0030  Gross per 24 hour   Intake 2170 72 ml   Output 2901 ml   Net -730 28 ml       Invasive Devices     Peripheral Intravenous Line            Peripheral IV 03/18/21 Dorsal (posterior); Left Hand less than 1 day          Drain            Urethral Catheter Non-latex;Straight-tip 16 Fr  less than 1 day                Physical Exam:   GEN: Otelia Kate Manzo appears well, alert and oriented x 3, pleasant and cooperative   CARDIO: RRR, no murmurs or rubs  RESP:  CTAB, no wheezes or rales  ABDOMEN: soft, no tenderness, no distention, fundus U-1, Incision C/D/I  EXTREMITIES: SCDs on, Negative Lukas's sign bilaterally      Labs:   Admission on 03/18/2021   Component Date Value    WBC 03/18/2021 8 75     RBC 03/18/2021 3 68*    Hemoglobin 03/18/2021 11 0*    Hematocrit 03/18/2021 34 1*    MCV 03/18/2021 93     MCH 03/18/2021 29 9     MCHC 03/18/2021 32 3     RDW 03/18/2021 13 2     Platelets 99/68/2055 248     MPV 03/18/2021 11 3  ABO Grouping 03/18/2021 O     Rh Factor 03/18/2021 Positive     Antibody Screen 03/18/2021 Negative     Specimen Expiration Date 03/18/2021 [id number]     pH, Cord Humberto 03/18/2021 7 322     pCO2, Cord Humberto 03/18/2021 52 6*    pO2, Cord Humberto 03/18/2021 20 5     HCO3, Cord Humberto 03/18/2021 26 6    [facility] Exc, Cord Humberto 03/18/2021 -0 5*    O2 Cont, Cord Humberto 03/18/2021 10 9     O2 HGB,VENOUS CORD 03/18/2021 45 9     pH, Cord Art 03/18/2021 7  243     pCO2, Cord Art 03/18/2021 68 7*    pO2, Cord Art 03/18/2021 14 2     HCO3, Cord Art 03/18/2021 29 0*    Base Exc, Cord Art 03/18/2021 -0 6*    O2 Content, Cord Art 03/18/2021 5 4     O2 Hgb, Arterial Cord 03/18/2021 23 4     WBC 03/18/2021 13 11*    RBC 03/18/2021 3 43*    Hemoglobin 03/18/2021 10 1*    Hematocrit 03/18/2021 31 8*    MCV 03/18/2021 93     MCH 03/18/2021 29 4     MCHC 03/18/2021 31 8     RDW 03/18/2021 13 0     MPV 03/18/2021 10 9     Platelets 22/16/5205 213     nRBC 03/18/2021 0     Neutrophils Relative 03/18/2021 77*    Immat GRANS % 03/18/2021 1     Lymphocytes Relative 03/18/2021 13*    Monocytes Relative 03/18/2021 9     Eosinophils Relative 03/18/2021 0     Basophils Relative 03/18/2021 0     Neutrophils Absolute 03/18/2021 10 07*    Immature Grans Absolute 03/18/2021 0 08     Lymphocytes Absolute 03/18/2021 1 70     Monocytes Absolute 03/18/2021 1 17     Eosinophils Absolute 03/18/2021 0 05     Basophils Absolute 03/18/2021 0 04     WBC 03/19/2021 11 50*    RBC 03/19/2021 2 89*    Hemoglobin 03/19/2021 8 5*    Hematocrit 03/19/2021 26 7*    MCV 03/19/2021 92     MCH 03/19/2021 29 4     MCHC 03/19/2021 31 8     RDW 03/19/2021 13 1     Platelets 69/38/9391 190     MPV 03/19/2021 10 8          Patient Active Problem List   Diagnosis    H/O gastric bypass    Obesity in pregnancy    Pyelonephritis during pregnancy    Previous c/s x3    Chlamydia    AMA    Abnormal glucola    39 weeks gestation of pregnancy    Status post repeat low transverse  section        Assessment and Plan     Michi Manzo is POD# 1 s/p RLTCS +BS  She is recovering well and is stable     POD# 1   - QBL 1 3L Preop Hgb 11 0 --> post op Hgb 10 1--> 8 1 this AM    - One episode of hypotension, BP taken while patient was sleeping    - UOP adequate, romero removed    - VT today     - On Lovenox 40 mg for DVT prophylaxis    - Continue current medications for pain management    - Encourage ambulation    - Encourage breast feeding     Postpartum Hemorrhage    - QBL 1 3L    - Hgb 11 0 --> 10 1-->8  1   - s/p methergine, cytotec and pitocin     H/o Gastric sleeve    - No NSAIDs     H/o Pyelonephritis During Pregnancy    - Was on suppression    - No current issue     Disposition    - Anticipate discharge home on POD# Magi Arreola MD  3/19/2021  5:54 AM

## 2021-03-19 NOTE — LACTATION NOTE
This note was copied from a baby's chart  Mother verbalized breastfeeding is going well  She verb  she does not have enough milk  I explained colostrum and how this is all she needs now  I also explained ways to know baby is getting enough  Enc to call for assistance as needed,phone # given

## 2021-03-19 NOTE — PLAN OF CARE
Problem: PAIN - ADULT  Goal: Verbalizes/displays adequate comfort level or baseline comfort level  Description: Interventions:  - Encourage patient to monitor pain and request assistance  - Assess pain using appropriate pain scale  - Administer analgesics based on type and severity of pain and evaluate response  - Implement non-pharmacological measures as appropriate and evaluate response  - Consider cultural and social influences on pain and pain management  - Notify physician/advanced practitioner if interventions unsuccessful or patient reports new pain  Outcome: Progressing     Problem: INFECTION - ADULT  Goal: Absence or prevention of progression during hospitalization  Description: INTERVENTIONS:  - Assess and monitor for signs and symptoms of infection  - Monitor lab/diagnostic results  - Monitor all insertion sites, i e  indwelling lines, tubes, and drains  - Monitor endotracheal if appropriate and nasal secretions for changes in amount and color  - [place] appropriate cooling/warming therapies per order  - Administer medications as ordered  - Instruct and encourage patient and family to use good hand hygiene technique  - Identify and instruct in appropriate isolation precautions for identified infection/condition  Outcome: Progressing  Goal: Absence of fever/infection during neutropenic period  Description: INTERVENTIONS:  - Monitor WBC    Outcome: Progressing     Problem: SAFETY ADULT  Goal: Patient will remain free of falls  Description: INTERVENTIONS:  - Assess patient frequently for physical needs  -  Identify cognitive and physical deficits and behaviors that affect risk of falls    -  [place] fall precautions as indicated by assessment   - Educate patient/family on patient safety including physical limitations  - Instruct patient to call for assistance with activity based on assessment  - Modify environment to reduce risk of injury  - Consider OT/PT consult to assist with strengthening/mobility  Outcome: Progressing  Goal: Maintain or return to baseline ADL function  Description: INTERVENTIONS:  -  Assess patient's ability to carry out ADLs; assess patient's baseline for ADL function and identify physical deficits which impact ability to perform ADLs (bathing, care of mouth/teeth, toileting, grooming, dressing, etc )  - Assess/evaluate cause of self-care deficits   - Assess range of motion  - Assess patient's mobility; develop plan if impaired  - Assess patient's need for assistive devices and provide as appropriate  - Encourage maximum independence but intervene and supervise when necessary  - Involve family in performance of ADLs  - Assess for home care needs following discharge   - Consider OT consult to assist with ADL evaluation and planning for discharge  - Provide patient education as appropriate  Outcome: Progressing  Goal: Maintain or return mobility status to optimal level  Description: INTERVENTIONS:  - Assess patient's baseline mobility status (ambulation, transfers, stairs, etc )    - Identify cognitive and physical deficits and behaviors that affect mobility  - Identify mobility aids required to assist with transfers and/or ambulation (gait belt, sit-to-stand, lift, walker, cane, etc )  - [place] fall precautions as indicated by assessment  - Record patient progress and toleration of activity level on Mobility SBAR; progress patient to next Phase/Stage  - Instruct patient to call for assistance with activity based on assessment  - Consider rehabilitation consult to assist with strengthening/weightbearing, etc   Outcome: Progressing     Problem: Knowledge Deficit  Goal: Patient/family/caregiver demonstrates understanding of disease process, treatment plan, medications, and discharge instructions  Description: Complete learning assessment and assess knowledge base    Interventions:  - Provide teaching at level of understanding  - Provide teaching via preferred learning methods  Outcome: Progressing     Problem: DISCHARGE PLANNING  Goal: Discharge to home or other facility with appropriate resources  Description: INTERVENTIONS:  - Identify barriers to discharge w/patient and caregiver  - Arrange for needed discharge resources and transportation as appropriate  - Identify discharge learning needs (meds, wound care, etc )  - Arrange for interpretive services to assist at discharge as needed  - Refer to Case Management Department for coordinating discharge planning if the patient needs post-hospital services based on physician/advanced practitioner order or complex needs related to functional status, cognitive ability, or social support system  Outcome: Progressing     Problem: POSTPARTUM  Goal: Experiences normal postpartum course  Description: INTERVENTIONS:  - Monitor maternal vital signs  - Assess uterine involution and lochia  Outcome: Progressing  Goal: Appropriate maternal -  bonding  Description: INTERVENTIONS:  - Identify family support  - Assess for appropriate maternal/infant bonding   -Encourage maternal/infant bonding opportunities  - Referral to  or  as needed  Outcome: Progressing  Goal: Establishment of infant feeding pattern  Description: INTERVENTIONS:  - Assess breast/bottle feeding  - Refer to lactation as needed  Outcome: Progressing  Goal: Incision(s), wounds(s) or drain site(s) healing without S/S of infection  Description: INTERVENTIONS  - Assess and document risk factors for skin impairment   - Assess and document dressing, incision, wound bed, drain sites and surrounding tissue  - Consider nutrition services referral as needed  - Oral mucous membranes remain intact  - Provide patient/ family education  Outcome: Progressing

## 2021-03-19 NOTE — PLAN OF CARE
Problem: PAIN - ADULT  Goal: Verbalizes/displays adequate comfort level or baseline comfort level  Description: Interventions:  - Encourage patient to monitor pain and request assistance  - Assess pain using appropriate pain scale  - Administer analgesics based on type and severity of pain and evaluate response  - Implement non-pharmacological measures as appropriate and evaluate response  - Consider cultural and social influences on pain and pain management  - Notify physician/advanced practitioner if interventions unsuccessful or patient reports new pain  Outcome: Progressing     Problem: INFECTION - ADULT  Goal: Absence or prevention of progression during hospitalization  Description: INTERVENTIONS:  - Assess and monitor for signs and symptoms of infection  - Monitor lab/diagnostic results  - Monitor all insertion sites, i e  indwelling lines, tubes, and drains  -   - [place] appropriate cooling/warming therapies per order  - Administer medications as ordered  - Instruct and encourage patient and family to use good hand hygiene technique  - Identify and instruct in appropriate isolation precautions for identified infection/condition  Outcome: Progressing  Goal: Absence of fever/infection during neutropenic period  Description: INTERVENTIONS:  - Monitor WBC    Outcome: Progressing     Problem: SAFETY ADULT  Goal: Patient will remain free of falls  Description: INTERVENTIONS:  - Assess patient frequently for physical needs  -  Identify cognitive and physical deficits and behaviors that affect risk of falls    -  [place] fall precautions as indicated by assessment   - Educate patient/family on patient safety including physical limitations  - Instruct patient to call for assistance with activity based on assessment  - Modify environment to reduce risk of injury  - Consider OT/PT consult to assist with strengthening/mobility  Outcome: Progressing  Goal: Maintain or return to baseline ADL function  Description: INTERVENTIONS:  -  Assess patient's ability to carry out ADLs; assess patient's baseline for ADL function and identify physical deficits which impact ability to perform ADLs (bathing, care of mouth/teeth, toileting, grooming, dressing, etc )  - Assess/evaluate cause of self-care deficits   - Assess range of motion  - Assess patient's mobility; develop plan if impaired  - Assess patient's need for assistive devices and provide as appropriate  - Encourage maximum independence but intervene and supervise when necessary  - Involve family in performance of ADLs  - Assess for home care needs following discharge   - Consider OT consult to assist with ADL evaluation and planning for discharge  - Provide patient education as appropriate  Outcome: Progressing  Goal: Maintain or return mobility status to optimal level  Description: INTERVENTIONS:  - Assess patient's baseline mobility status (ambulation, transfers, stairs, etc )    - Identify cognitive and physical deficits and behaviors that affect mobility  - Identify mobility aids required to assist with transfers and/or ambulation (gait belt, sit-to-stand, lift, walker, cane, etc )  - [place] fall precautions as indicated by assessment  - Record patient progress and toleration of activity level on Mobility SBAR; progress patient to next Phase/Stage  - Instruct patient to call for assistance with activity based on assessment  - Consider rehabilitation consult to assist with strengthening/weightbearing, etc   Outcome: Progressing     Problem: Knowledge Deficit  Goal: Patient/family/caregiver demonstrates understanding of disease process, treatment plan, medications, and discharge instructions  Description: Complete learning assessment and assess knowledge base    Interventions:  - Provide teaching at level of understanding  - Provide teaching via preferred learning methods  Outcome: Progressing     Problem: DISCHARGE PLANNING  Goal: Discharge to home or other facility with appropriate resources  Description: INTERVENTIONS:  - Identify barriers to discharge w/patient and caregiver  - Arrange for needed discharge resources and transportation as appropriate  - Identify discharge learning needs (meds, wound care, etc )  - Arrange for interpretive services to assist at discharge as needed  - Refer to Case Management Department for coordinating discharge planning if the patient needs post-hospital services based on physician/advanced practitioner order or complex needs related to functional status, cognitive ability, or social support system  Outcome: Progressing     Problem: POSTPARTUM  Goal: Experiences normal postpartum course  Description: INTERVENTIONS:  - Monitor maternal vital signs  - Assess uterine involution and lochia  Outcome: Progressing  Goal: Appropriate maternal -  bonding  Description: INTERVENTIONS:  - Identify family support  - Assess for appropriate maternal/infant bonding   -Encourage maternal/infant bonding opportunities  - Referral to  or  as needed  Outcome: Progressing  Goal: Establishment of infant feeding pattern  Description: INTERVENTIONS:  - Assess breast/bottle feeding  - Refer to lactation as needed  Outcome: Progressing  Goal: Incision(s), wounds(s) or drain site(s) healing without S/S of infection  Description: INTERVENTIONS  - Assess and document risk factors for skin impairment   - Assess and document dressing, incision, wound bed, drain sites and surrounding tissue  - Consider nutrition services referral as needed  - Oral mucous membranes remain intact  - Provide patient/ family education  Outcome: Progressing

## 2021-03-19 NOTE — PLAN OF CARE
Problem: PAIN - ADULT  Goal: Verbalizes/displays adequate comfort level or baseline comfort level  Description: Interventions:  - Encourage patient to monitor pain and request assistance  - Assess pain using appropriate pain scale  - Administer analgesics based on type and severity of pain and evaluate response  - Implement non-pharmacological measures as appropriate and evaluate response  - Consider cultural and social influences on pain and pain management  - Notify physician/advanced practitioner if interventions unsuccessful or patient reports new pain  Outcome: Progressing     Problem: INFECTION - ADULT  Goal: Absence or prevention of progression during hospitalization  Description: INTERVENTIONS:  - Assess and monitor for signs and symptoms of infection  - Monitor lab/diagnostic results  - Monitor all insertion sites, i e  indwelling lines, tubes, and drains  -   - [place] appropriate cooling/warming therapies per order  - Administer medications as ordered  - Instruct and encourage patient and family to use good hand hygiene technique  - Identify and instruct in appropriate isolation precautions for identified infection/condition  Outcome: Progressing  Goal: Absence of fever/infection during neutropenic period  Description: INTERVENTIONS:  - Monitor WBC    Outcome: Progressing     Problem: SAFETY ADULT  Goal: Patient will remain free of falls  Description: INTERVENTIONS:  - Assess patient frequently for physical needs  -  Identify cognitive and physical deficits and behaviors that affect risk of falls    -  [place] fall precautions as indicated by assessment   - Educate patient/family on patient safety including physical limitations  - Instruct patient to call for assistance with activity based on assessment  - Modify environment to reduce risk of injury  - Consider OT/PT consult to assist with strengthening/mobility  Outcome: Progressing  Goal: Maintain or return to baseline ADL function  Description: INTERVENTIONS:  -  Assess patient's ability to carry out ADLs; assess patient's baseline for ADL function and identify physical deficits which impact ability to perform ADLs (bathing, care of mouth/teeth, toileting, grooming, dressing, etc )  - Assess/evaluate cause of self-care deficits   - Assess range of motion  - Assess patient's mobility; develop plan if impaired  - Assess patient's need for assistive devices and provide as appropriate  - Encourage maximum independence but intervene and supervise when necessary  - Involve family in performance of ADLs  - Assess for home care needs following discharge   - Consider OT consult to assist with ADL evaluation and planning for discharge  - Provide patient education as appropriate  Outcome: Progressing  Goal: Maintain or return mobility status to optimal level  Description: INTERVENTIONS:  - Assess patient's baseline mobility status (ambulation, transfers, stairs, etc )    - Identify cognitive and physical deficits and behaviors that affect mobility  - Identify mobility aids required to assist with transfers and/or ambulation (gait belt, sit-to-stand, lift, walker, cane, etc )  - [place] fall precautions as indicated by assessment  - Record patient progress and toleration of activity level on Mobility SBAR; progress patient to next Phase/Stage  - Instruct patient to call for assistance with activity based on assessment  - Consider rehabilitation consult to assist with strengthening/weightbearing, etc   Outcome: Progressing     Problem: Knowledge Deficit  Goal: Patient/family/caregiver demonstrates understanding of disease process, treatment plan, medications, and discharge instructions  Description: Complete learning assessment and assess knowledge base    Interventions:  - Provide teaching at level of understanding  - Provide teaching via preferred learning methods  Outcome: Progressing     Problem: DISCHARGE PLANNING  Goal: Discharge to home or other facility with appropriate resources  Description: INTERVENTIONS:  - Identify barriers to discharge w/patient and caregiver  - Arrange for needed discharge resources and transportation as appropriate  - Identify discharge learning needs (meds, wound care, etc )  - Arrange for interpretive services to assist at discharge as needed  - Refer to Case Management Department for coordinating discharge planning if the patient needs post-hospital services based on physician/advanced practitioner order or complex needs related to functional status, cognitive ability, or social support system  Outcome: Progressing     Problem: POSTPARTUM  Goal: Experiences normal postpartum course  Description: INTERVENTIONS:  - Monitor maternal vital signs  - Assess uterine involution and lochia  Outcome: Progressing  Goal: Appropriate maternal -  bonding  Description: INTERVENTIONS:  - Identify family support  - Assess for appropriate maternal/infant bonding   -Encourage maternal/infant bonding opportunities  - Referral to  or  as needed  Outcome: Progressing  Goal: Establishment of infant feeding pattern  Description: INTERVENTIONS:  - Assess breast/bottle feeding  - Refer to lactation as needed  Outcome: Progressing  Goal: Incision(s), wounds(s) or drain site(s) healing without S/S of infection  Description: INTERVENTIONS  - Assess and document risk factors for skin impairment   - Assess and document dressing, incision, wound bed, drain sites and surrounding tissue  - Consider nutrition services referral as needed  - Oral mucous membranes remain intact  - Provide patient/ family education  Outcome: Progressing

## 2021-03-20 VITALS
DIASTOLIC BLOOD PRESSURE: 71 MMHG | SYSTOLIC BLOOD PRESSURE: 105 MMHG | OXYGEN SATURATION: 96 % | HEART RATE: 105 BPM | TEMPERATURE: 98.3 F | RESPIRATION RATE: 18 BRPM

## 2021-03-20 PROCEDURE — 99024 POSTOP FOLLOW-UP VISIT: CPT | Performed by: OBSTETRICS & GYNECOLOGY

## 2021-03-20 RX ORDER — OXYCODONE HYDROCHLORIDE 5 MG/1
5 TABLET ORAL EVERY 4 HOURS PRN
Qty: 10 TABLET | Refills: 0 | Status: SHIPPED | OUTPATIENT
Start: 2021-03-20 | End: 2021-03-30

## 2021-03-20 RX ORDER — OXYCODONE HYDROCHLORIDE 5 MG/1
5 TABLET ORAL EVERY 4 HOURS PRN
Qty: 10 TABLET | Refills: 0
Start: 2021-03-20 | End: 2021-03-20

## 2021-03-20 RX ORDER — ACETAMINOPHEN 325 MG/1
650 TABLET ORAL EVERY 6 HOURS PRN
Qty: 30 TABLET | Refills: 1 | Status: SHIPPED | OUTPATIENT
Start: 2021-03-20

## 2021-03-20 RX ORDER — DOCUSATE SODIUM 100 MG/1
100 CAPSULE, LIQUID FILLED ORAL 2 TIMES DAILY
Qty: 60 CAPSULE | Refills: 1 | Status: SHIPPED | OUTPATIENT
Start: 2021-03-20 | End: 2021-03-31

## 2021-03-20 RX ORDER — IBUPROFEN 600 MG/1
600 TABLET ORAL EVERY 6 HOURS PRN
Qty: 30 TABLET | Refills: 1 | Status: SHIPPED | OUTPATIENT
Start: 2021-03-20 | End: 2021-03-31

## 2021-03-20 RX ADMIN — DOCUSATE SODIUM 100 MG: 100 CAPSULE, LIQUID FILLED ORAL at 08:39

## 2021-03-20 RX ADMIN — ACETAMINOPHEN 975 MG: 325 TABLET ORAL at 00:15

## 2021-03-20 RX ADMIN — ACETAMINOPHEN 975 MG: 325 TABLET ORAL at 06:06

## 2021-03-20 RX ADMIN — SENNOSIDES 8.6 MG: 8.6 TABLET ORAL at 08:39

## 2021-03-20 RX ADMIN — OXYCODONE HYDROCHLORIDE 10 MG: 5 TABLET ORAL at 00:15

## 2021-03-20 NOTE — PROGRESS NOTES
Progress Note - OB/GYN  Micha Manzo 38 y o  female MRN: [id number]  Unit/Bed#: L&D 306-01 Encounter: [id number]      Assessment:  Postop Day #2 s/p RLTCS + BS, stable    Plan:    1) Postoperative care  - QBL 1 3 L Hgb 11-->8 4-->Venofer 3/19  - Voiding appropriately   - Encourage ambulation  - Encourage breastfeeding  - Anticipate discharge today     Chief Complaint:    Post delivery  Patient is doing well  Lochia WNL  Pain well controlled      Subjective    Pain: Well controlled  Tolerating PO: yes  Voiding: yes  Flatus: yes  Ambulating: yes  Chest pain: no  Shortness of breath: no  Leg pain: no  Lochia: minimal     Vitals:   /71 (BP Location: Left arm)   Pulse 105   Temp 98 3 °F (36 8 °C) (Oral)   Resp 18   LMP 06/17/2020   SpO2 96%   Breastfeeding Yes       Intake/Output Summary (Last 24 hours) at 3/20/2021 9121  Last data filed at 3/19/2021 2000  Gross per 24 hour   Intake --   Output 2000 ml   Net -2000 ml       Invasive Devices     None                 Physical Exam:   GEN: Micha Manzo appears well, alert and oriented x 3, pleasant and cooperative   ABDOMEN: soft, no tenderness, no distention, fundus firm, Incision C/D/I  EXTREMITIES: SCDs on      Labs:   Admission on 03/18/2021   Component Date Value    WBC 03/18/2021 8 75     RBC 03/18/2021 3 68*    Hemoglobin 03/18/2021 11 0*    Hematocrit 03/18/2021 34 1*    MCV 03/18/2021 93     MCH 03/18/2021 29 9     MCHC 03/18/2021 32 3     RDW 03/18/2021 13 2     Platelets 31/05/9827 248     MPV 03/18/2021 11 3     RPR 03/18/2021 Non-Reactive     ABO Grouping 03/18/2021 O     Rh Factor 03/18/2021 Positive     Antibody Screen 03/18/2021 Negative     Specimen Expiration Date 03/18/2021 19062587     pH, Cord Humberto 03/18/2021 7 322     pCO2, Cord Humberto 03/18/2021 52 6*    pO2, Cord Humberto 03/18/2021 20 5     HCO3, Cord Humberto 03/18/2021 26 6     Base Exc, Cord Humberto 03/18/2021 -0 5*    O2 Cont, Cord Humberto 03/18/2021 10 9     O2 HGB,VENOUS CORD 03/18/2021 45 9     pH, Cord Art 03/18/2021 7  243     pCO2, Cord Art 03/18/2021 68 7*    pO2, Cord Art 03/18/2021 14 2     HCO3, Cord Art 03/18/2021 29 0*    Base Exc, Cord Art 03/18/2021 -0 6*    O2 Content, Cord Art 03/18/2021 5 4     O2 Hgb, Arterial Cord 03/18/2021 23 4     WBC 03/18/2021 13 11*    RBC 03/18/2021 3 43*    Hemoglobin 03/18/2021 10 1*    Hematocrit 03/18/2021 31 8*    MCV 03/18/2021 93     MCH 03/18/2021 29 4     MCHC 03/18/2021 31 8     RDW 03/18/2021 13 0     MPV 03/18/2021 10 9     Platelets 77/74/1876 213     nRBC 03/18/2021 0     Neutrophils Relative 03/18/2021 77*    Immat GRANS % 03/18/2021 1     Lymphocytes Relative 03/18/2021 13*    Monocytes Relative 03/18/2021 9     Eosinophils Relative 03/18/2021 0     Basophils Relative 03/18/2021 0     Neutrophils Absolute 03/18/2021 10 07*    Immature Grans Absolute 03/18/2021 0 08     Lymphocytes Absolute 03/18/2021 1 70     Monocytes Absolute 03/18/2021 1 17     Eosinophils Absolute 03/18/2021 0 05     Basophils Absolute 03/18/2021 0 04     WBC 03/19/2021 11 50*    RBC 03/19/2021 2 89*    Hemoglobin 03/19/2021 8 5*    Hematocrit 03/19/2021 26 7*    MCV 03/19/2021 92     MCH 03/19/2021 29 4     MCHC 03/19/2021 31 8     RDW 03/19/2021 13 1     Platelets 04/58/1252 190     MPV 03/19/2021 10 8     WBC 03/19/2021 12 22*    RBC 03/19/2021 2 82*    Hemoglobin 03/19/2021 8 4*    Hematocrit 03/19/2021 26 2*    MCV 03/19/2021 93     MCH 03/19/2021 29 8     MCHC 03/19/2021 32 1     RDW 03/19/2021 13 2     MPV 03/19/2021 10 9     Platelets 71/63/7133 197     nRBC 03/19/2021 0     Neutrophils Relative 03/19/2021 77*    Immat GRANS % 03/19/2021 1     Lymphocytes Relative 03/19/2021 13*    Monocytes Relative 03/19/2021 9     Eosinophils Relative 03/19/2021 0     Basophils Relative 03/19/2021 0     Neutrophils Absolute 03/19/2021 9 49*    Immature Grans Absolute 03/19/2021 0 07     Lymphocytes Absolute 2021 1 53     Monocytes Absolute 2021 1 06     Eosinophils Absolute 2021 0 04     Basophils Absolute 2021 0 03          Patient Active Problem List   Diagnosis    H/O gastric bypass    Obesity in pregnancy    Pyelonephritis during pregnancy    Previous c/s x3    Chlamydia    AMA    Abnormal glucola    39 weeks gestation of pregnancy    Status post repeat low transverse  section          Paul Singh MD  3/20/2021  9:53 AM

## 2021-03-20 NOTE — LACTATION NOTE
This note was copied from a baby's chart  Met with mother to go over [place] discharge breastfeeding booklet including the feeding log  Emphasized 8 or more (12) feedings in a 24 hour period, what to expect for the number of diapers per day of life and the progression of properties of the  stooling pattern  Reviewed breastfeeding and your lifestyle, storage and preparation of breast milk, how to keep you breast pump clean, the employed breastfeeding mother and paced bottle feeding handouts  Booklet included Breastfeeding Resources for after discharge including access to the number for the [phone]  [facility]  to call for assistance, questions and concerns  Extension number for inpatient lactation support provided

## 2021-03-20 NOTE — PLAN OF CARE
Problem: POSTPARTUM  Goal: Experiences normal postpartum course  Description: INTERVENTIONS:  - Monitor maternal vital signs  - Assess uterine involution and lochia  Outcome: Adequate for Discharge  Goal: Appropriate maternal -  bonding  Description: INTERVENTIONS:  - Identify family support  - Assess for appropriate maternal/infant bonding   -Encourage maternal/infant bonding opportunities  - Referral to  or  as needed  Outcome: Adequate for Discharge  Goal: Establishment of infant feeding pattern  Description: INTERVENTIONS:  - Assess breast/bottle feeding  - Refer to lactation as needed  Outcome: Adequate for Discharge  Goal: Incision(s), wounds(s) or drain site(s) healing without S/S of infection  Description: INTERVENTIONS  - Assess and document risk factors for skin impairment   - Assess and document dressing, incision, wound bed, drain sites and surrounding tissue  - Consider nutrition services referral as needed  - Oral mucous membranes remain intact  - Provide patient/ family education  Outcome: Adequate for Discharge     Problem: PAIN - ADULT  Goal: Verbalizes/displays adequate comfort level or baseline comfort level  Description: Interventions:  - Encourage patient to monitor pain and request assistance  - Assess pain using appropriate pain scale  - Administer analgesics based on type and severity of pain and evaluate response  - Implement non-pharmacological measures as appropriate and evaluate response  - Consider cultural and social influences on pain and pain management  - Notify physician/advanced practitioner if interventions unsuccessful or patient reports new pain  Outcome: Adequate for Discharge     Problem: INFECTION - ADULT  Goal: Absence or prevention of progression during hospitalization  Description: INTERVENTIONS:  - Assess and monitor for signs and symptoms of infection  - Monitor lab/diagnostic results  - Monitor all insertion sites, i e  indwelling lines, tubes, and drains  -   - [place] appropriate cooling/warming therapies per order  - Administer medications as ordered  - Instruct and encourage patient and family to use good hand hygiene technique  - Identify and instruct in appropriate isolation precautions for identified infection/condition  Outcome: Adequate for Discharge  Goal: Absence of fever/infection during neutropenic period  Description: INTERVENTIONS:  - Monitor WBC    Outcome: Adequate for Discharge     Problem: SAFETY ADULT  Goal: Patient will remain free of falls  Description: INTERVENTIONS:  - Assess patient frequently for physical needs  -  Identify cognitive and physical deficits and behaviors that affect risk of falls    -  [place] fall precautions as indicated by assessment   - Educate patient/family on patient safety including physical limitations  - Instruct patient to call for assistance with activity based on assessment  - Modify environment to reduce risk of injury  - Consider OT/PT consult to assist with strengthening/mobility  Outcome: Adequate for Discharge  Goal: Maintain or return to baseline ADL function  Description: INTERVENTIONS:  -  Assess patient's ability to carry out ADLs; assess patient's baseline for ADL function and identify physical deficits which impact ability to perform ADLs (bathing, care of mouth/teeth, toileting, grooming, dressing, etc )  - Assess/evaluate cause of self-care deficits   - Assess range of motion  - Assess patient's mobility; develop plan if impaired  - Assess patient's need for assistive devices and provide as appropriate  - Encourage maximum independence but intervene and supervise when necessary  - Involve family in performance of ADLs  - Assess for home care needs following discharge   - Consider OT consult to assist with ADL evaluation and planning for discharge  - Provide patient education as appropriate  Outcome: Adequate for Discharge  Goal: Maintain or return mobility status to optimal level  Description: INTERVENTIONS:  - Assess patient's baseline mobility status (ambulation, transfers, stairs, etc )    - Identify cognitive and physical deficits and behaviors that affect mobility  - Identify mobility aids required to assist with transfers and/or ambulation (gait belt, sit-to-stand, lift, walker, cane, etc )  - [place] fall precautions as indicated by assessment  - Record patient progress and toleration of activity level on Mobility SBAR; progress patient to next Phase/Stage  - Instruct patient to call for assistance with activity based on assessment  - Consider rehabilitation consult to assist with strengthening/weightbearing, etc   Outcome: Adequate for Discharge     Problem: Knowledge Deficit  Goal: Patient/family/caregiver demonstrates understanding of disease process, treatment plan, medications, and discharge instructions  Description: Complete learning assessment and assess knowledge base    Interventions:  - Provide teaching at level of understanding  - Provide teaching via preferred learning methods  Outcome: Adequate for Discharge     Problem: DISCHARGE PLANNING  Goal: Discharge to home or other facility with appropriate resources  Description: INTERVENTIONS:  - Identify barriers to discharge w/patient and caregiver  - Arrange for needed discharge resources and transportation as appropriate  - Identify discharge learning needs (meds, wound care, etc )  - Arrange for interpretive services to assist at discharge as needed  - Refer to Case Management Department for coordinating discharge planning if the patient needs post-hospital services based on physician/advanced practitioner order or complex needs related to functional status, cognitive ability, or social support system  Outcome: Adequate for Discharge

## 2021-03-22 NOTE — UTILIZATION REVIEW
Notification of Maternity/Delivery & [place] Birth Information for Admission   Notification of Maternity/Delivery for Admission to our facility No 48  Be advised that this patient was admitted to our facility under Inpatient Status  Contact Bridgettpérez Abimael at [phone] for additional admission information  Mackenzie Katharina PARENT/CHILD HEALTH UR DEPT  DEDICATED [phone]  Mother &  Information   Patient Name: Eyal Khoury YOB: 1985   Delivering clinician:    OB History        4    Para   4    Term   4       0    AB   0    Living   4       SAB   0    TAB   0    Ectopic   0    Multiple   0    Live Births   4                Name & MRN:   Information for the patient's :  Scott Manzo Girl Rai [place]) [[id number]]      Delivery Information:  Sex: female  Delivered 3/18/2021 11:42 AM by , Low Transverse; Gestational Age: 36w3d     Measurements:  Weight: 7 lb 9 7 oz (3450 g); Height: 20 5"    APGAR 1 minute 5 minutes 10 minutes   Totals: 8 9       Birth Information: 38 y o  female MRN: [id number] Unit/Bed#: L&D 306-01 Estimated Date of Delivery: 3/24/21  Birthweight: No birth weight on file   Gestational Age: <None> Delivery Type: , Low Transverse          APGARS  One minute Five minutes Ten minutes   Totals:                 [address]   [address]:   [address]  Tax ID: [id number]  NPI: [id number] Attending Provider/NPI:  Phone:  Address: Dre Rowan Md [[id number]]  [phone]  Same as Fam Khan 1106 of Service Code: 24 Place of Service Name: [address] [place] [address]   Start Date: 3/18/21 0809   Discharge Date & Time: 3/20/2021 12:24 PM    Type of Admission: Inpatient Status Discharge Disposition (if discharged): Home/Self Care   Patient Diagnoses:   Encounter for  delivery without indication [O82]  The primary encounter diagnosis was Lactating mother  Diagnoses of 39 weeks gestation of pregnancy and Status post repeat low transverse  section were also pertinent to this visit  1  Lactating mother    2  39 weeks gestation of pregnancy    3  Status post repeat low transverse  section       Orders: Admission Orders (From admission, onward)     Ordered        21 0809  Inpatient Admission  Once                    Assigned Utilization Review Contact: Ward Baldwin  Utilization   Network Utilization Review Department  Phone: [phone]; Fax [phone]  Email: [email or web address]

## 2021-03-26 LAB — PLACENTA IN STORAGE: NORMAL

## 2021-03-26 NOTE — UTILIZATION REVIEW
Notification of Maternity/Delivery & [place] Birth Information for Admission   Notification of Maternity/Delivery for Admission to our facility No 48  Be advised that this patient was admitted to our facility under Inpatient Status  Contact Lonnymigueleliu De Los Santos at [phone] for additional admission information  Randy Torsten PARENT/CHILD HEALTH UR DEPT  DEDICATED [phone]  Mother & [place] Information   Patient Name: Veronica Barbosa YOB: 1985   Delivering clinician:    OB History        4    Para   4    Term   4       0    AB   0    Living   4       SAB   0    TAB   0    Ectopic   0    Multiple   0    Live Births   4                Name & MRN:   Information for the patient's :  Maria Luisa Shetty [[id number]]     [place] Delivery Information:  Sex: female  Delivered 3/18/2021 11:42 AM by , Low Transverse; Gestational Age: 36w3d    [place] Measurements:  Weight: 7 lb 9 7 oz (3450 g); Height: 20 5"    APGAR 1 minute 5 minutes 10 minutes   Totals: 8 9      [place] Birth Information: 38 y o  female MRN: [id number] Unit/Bed#: L&D 306-01 Estimated Date of Delivery: 3/24/21  Birthweight: No birth weight on file   Gestational Age: <None> Delivery Type: , Low Transverse          APGARS  One minute Five minutes Ten minutes   Totals:                 [address]   [address]:   [address] [facility]  Tax ID: [id number]  NPI: [id number] Attending Provider/NPI:  Phone:  Address: Dashawn Collier Md [[id number]]  [phone]  Same as Fam Khan 1106 of Service Code: 24 Place of Service Name: [address] [place] [address]   Start Date: 3/18/21 0809   Discharge Date & Time: 3/20/2021 12:24 PM    Type of Admission: Inpatient Status Discharge Disposition (if discharged): Home/Self Care   Patient Diagnoses:   Encounter for  delivery without indication [O82]  The primary encounter diagnosis was Lactating mother  Diagnoses of 39 weeks gestation of pregnancy and Status post repeat low transverse  section were also pertinent to this visit  1  Lactating mother    2  39 weeks gestation of pregnancy    3  Status post repeat low transverse  section       Orders: Admission Orders (From admission, onward)     Ordered        21 0809  Inpatient Admission  Once                    Assigned Utilization Review Contact: Aleksandr Emery  Utilization   Network Utilization Review Department  Phone: [phone]; Fax [phone]  Email: [email or web address]

## 2021-03-29 ENCOUNTER — HOSPITAL ENCOUNTER (EMERGENCY)
Facility: HOSPITAL | Age: 36
Discharge: HOME | End: 2021-03-30
Attending: EMERGENCY MEDICINE | Admitting: EMERGENCY MEDICINE
Payer: COMMERCIAL

## 2021-03-29 ENCOUNTER — APPOINTMENT (EMERGENCY)
Dept: CT IMAGING | Facility: HOSPITAL | Age: 36
End: 2021-03-29
Payer: COMMERCIAL

## 2021-03-29 DIAGNOSIS — G89.18 POST-OPERATIVE PAIN: Primary | ICD-10-CM

## 2021-03-29 LAB
ANION GAP SERPL CALCULATED.3IONS-SCNC: 8 MMOL/L (ref 5–14)
BASOPHILS # BLD AUTO: 0.1 THOUSANDS/ΜL (ref 0–0.1)
BASOPHILS NFR BLD AUTO: 1 % (ref 0–1)
BUN SERPL-MCNC: 15 MG/DL (ref 5–25)
CALCIUM SERPL-MCNC: 9 MG/DL (ref 8.4–10.2)
CHLORIDE SERPL-SCNC: 104 MMOL/L (ref 97–108)
CO2 SERPL-SCNC: 27 MMOL/L (ref 22–30)
CREAT SERPL-MCNC: 0.82 MG/DL (ref 0.6–1.2)
EOSINOPHIL # BLD AUTO: 0.3 THOUSAND/ΜL (ref 0–0.4)
EOSINOPHIL NFR BLD AUTO: 3 % (ref 0–6)
ERYTHROCYTE [DISTWIDTH] IN BLOOD BY AUTOMATED COUNT: 14.6 %
GFR SERPL CREATININE-BSD FRML MDRD: 92 ML/MIN/1.73SQ M
GLUCOSE SERPL-MCNC: 94 MG/DL (ref 70–99)
HCT VFR BLD AUTO: 32.5 % (ref 36–46)
HGB BLD-MCNC: 10.8 G/DL (ref 12–16)
LYMPHOCYTES # BLD AUTO: 2.6 THOUSANDS/ΜL (ref 0.5–4)
LYMPHOCYTES NFR BLD AUTO: 25 % (ref 25–45)
MCH RBC QN AUTO: 29.8 PG (ref 26–34)
MCHC RBC AUTO-ENTMCNC: 33 G/DL (ref 31–36)
MCV RBC AUTO: 90 FL (ref 80–100)
MONOCYTES # BLD AUTO: 0.6 THOUSAND/ΜL (ref 0.2–0.9)
MONOCYTES NFR BLD AUTO: 6 % (ref 1–10)
NEUTROPHILS # BLD AUTO: 7 THOUSANDS/ΜL (ref 1.8–7.8)
NEUTS SEG NFR BLD AUTO: 65 % (ref 45–65)
PLATELET # BLD AUTO: 457 THOUSANDS/UL (ref 150–450)
PMV BLD AUTO: 8.1 FL (ref 8.9–12.7)
POTASSIUM SERPL-SCNC: 3.9 MMOL/L (ref 3.6–5)
RBC # BLD AUTO: 3.61 MILLION/UL (ref 4–5.2)
SODIUM SERPL-SCNC: 139 MMOL/L (ref 137–147)
WBC # BLD AUTO: 10.7 THOUSAND/UL (ref 4.5–11)

## 2021-03-29 PROCEDURE — 85025 COMPLETE CBC W/AUTO DIFF WBC: CPT | Performed by: EMERGENCY MEDICINE

## 2021-03-29 PROCEDURE — 74176 CT ABD & PELVIS W/O CONTRAST: CPT

## 2021-03-29 PROCEDURE — 99282 EMERGENCY DEPT VISIT SF MDM: CPT | Performed by: EMERGENCY MEDICINE

## 2021-03-29 PROCEDURE — 80048 BASIC METABOLIC PNL TOTAL CA: CPT | Performed by: EMERGENCY MEDICINE

## 2021-03-29 PROCEDURE — 36415 COLL VENOUS BLD VENIPUNCTURE: CPT | Performed by: EMERGENCY MEDICINE

## 2021-03-29 PROCEDURE — 99284 EMERGENCY DEPT VISIT MOD MDM: CPT

## 2021-03-29 RX ORDER — ACETAMINOPHEN 325 MG/1
975 TABLET ORAL ONCE
Status: COMPLETED | OUTPATIENT
Start: 2021-03-30 | End: 2021-03-29

## 2021-03-29 RX ADMIN — ACETAMINOPHEN 975 MG: 325 TABLET, FILM COATED ORAL at 23:57

## 2021-03-30 VITALS
DIASTOLIC BLOOD PRESSURE: 72 MMHG | WEIGHT: 154.8 LBS | SYSTOLIC BLOOD PRESSURE: 112 MMHG | HEART RATE: 69 BPM | TEMPERATURE: 97.9 F | OXYGEN SATURATION: 100 % | BODY MASS INDEX: 31.27 KG/M2 | RESPIRATION RATE: 18 BRPM

## 2021-03-30 RX ORDER — ACETAMINOPHEN 500 MG
500 TABLET ORAL EVERY 6 HOURS PRN
Qty: 30 TABLET | Refills: 0 | Status: SHIPPED | OUTPATIENT
Start: 2021-03-30 | End: 2021-03-31

## 2021-03-30 NOTE — ED NOTES
Abdulkadir Cerrato in to speak with patient concerning results of testing and discharge instructions and follow up  Patient feels better upon discharge       Shari Wiseman RN  03/30/21 3753

## 2021-03-30 NOTE — ED NOTES
Patient reports that her pain is almost all gone and that she is feeling better  Patient reports her pain is maybe a "1"  Patient is resting quietly in bed       Armaan Cooley RN  03/30/21 0035

## 2021-03-30 NOTE — DISCHARGE INSTRUCTIONS
Use Tylenol for pain  Avoid narcotic prescriptions of your breast feeding  Return for new worsening complaints  Follow-up with gynecology

## 2021-03-30 NOTE — ED PROVIDER NOTES
History  Chief Complaint   Patient presents with    Abdominal Pain     Patient is a 68-year-old female who is 12 days postpartum who is complaining of acute onset of lower abdominal pain  Sharp pain that started 2 hours prior to arrival   Denies any nausea vomiting diarrhea no fevers sweats or chills  Took a oxycodone earlier with no relief  Denies any bleeding  Pain was so great that patient drop to the floor in front of the registration window and feigned passing out  Patient was awake the whole time and exhibited purposeful movement  Patient was picked up put on a stretcher and taken to ER bed 7  Baby is at home with family and patient is breastfeeding  Prior to Admission Medications   Prescriptions Last Dose Informant Patient Reported? Taking?    Prenatal Vit-Fe Fumarate-FA (Prenatal Vitamin) 27-0 8 MG TABS  Self No No   Sig: Take 1 tablet by mouth daily   acetaminophen (TYLENOL) 325 mg tablet   No No   Sig: Take 2 tablets (650 mg total) by mouth every 6 (six) hours as needed for moderate pain or headaches   docusate sodium (COLACE) 100 mg capsule   No No   Sig: Take 1 capsule (100 mg total) by mouth 2 (two) times a day   hydrocortisone 1 % cream  Self No No   Sig: Apply topically 4 (four) times a day as needed for irritation   ibuprofen (MOTRIN) 600 mg tablet   No No   Sig: Take 1 tablet (600 mg total) by mouth every 6 (six) hours as needed for mild pain (cramping)   oxyCODONE (ROXICODONE) 5 mg immediate release tablet   No No   Sig: Take 1 tablet (5 mg total) by mouth every 4 (four) hours as needed for severe pain for up to 10 daysMax Daily Amount: 30 mg      Facility-Administered Medications: None       Past Medical History:   Diagnosis Date    34 weeks gestation of pregnancy 2021    No known health problems        Past Surgical History:   Procedure Laterality Date     SECTION  , ,     VT  DELIVERY ONLY N/A 3/18/2021    Procedure:  SECTION () REPEAT;  Surgeon: Lamont Bautista MD;  Location: [facility];  Service: Obstetrics    SLEEVE GASTROPLASTY  2018    TUBAL LIGATION N/A 3/18/2021    Procedure: LIGATION/COAGULATION TUBAL;  Surgeon: Lamont Bautsita MD;  Location: [facility];  Service: Obstetrics       Family History   Problem Relation Age of Onset    Diabetes Mother     Diabetes Father     No Known Problems Sister     No Known Problems Brother     No Known Problems Daughter     No Known Problems Son     Cancer Neg Hx     Breast cancer Neg Hx      I have reviewed and agree with the history as documented  E-Cigarette/Vaping    E-Cigarette Use Never User      E-Cigarette/Vaping Substances    Nicotine No     THC No     CBD No     Flavoring No     Other No     Unknown No      Social History     Tobacco Use    Smoking status: Never Smoker    Smokeless tobacco: Never Used   Substance Use Topics    Alcohol use: Not Currently     Frequency: Monthly or less     Drinks per session: 1 or 2    Drug use: Never       Review of Systems   Constitutional: Positive for activity change  HENT: Negative  Eyes: Negative  Respiratory: Negative  Cardiovascular: Negative  Gastrointestinal: Positive for abdominal pain  Negative for diarrhea, nausea and vomiting  Endocrine: Negative  Genitourinary: Negative  Musculoskeletal: Negative  Skin: Negative  Allergic/Immunologic: Negative  Neurological: Negative  Hematological: Negative  Psychiatric/Behavioral: Negative  All other systems reviewed and are negative  Physical Exam  Physical Exam  Vitals signs and nursing note reviewed  Constitutional:       Appearance: She is well-developed and normal weight  HENT:      Head: Normocephalic  Mouth/Throat:      Mouth: Mucous membranes are moist       Pharynx: Oropharynx is clear  Eyes:      Extraocular Movements: Extraocular movements intact     Cardiovascular:      Rate and Rhythm: Normal rate and regular rhythm  Heart sounds: Normal heart sounds  Pulmonary:      Effort: Pulmonary effort is normal       Breath sounds: Normal breath sounds  Abdominal:      General: Bowel sounds are normal       Palpations: Abdomen is soft  Tenderness: There is abdominal tenderness in the right lower quadrant  There is no right CVA tenderness or left CVA tenderness  Hernia: No hernia is present  Skin:     General: Skin is warm and dry  Capillary Refill: Capillary refill takes less than 2 seconds  Comments:  incision clean dry and intact  No fluctuance no warmth no erythema   Neurological:      General: No focal deficit present  Mental Status: She is alert and oriented to person, place, and time  Cranial Nerves: No cranial nerve deficit  Motor: No weakness  Psychiatric:         Mood and Affect: Mood is anxious           Vital Signs  ED Triage Vitals [21]   Temp Pulse Respirations Blood Pressure SpO2   -- 67 18 166/97 100 %      Temp src Heart Rate Source Patient Position - Orthostatic VS BP Location FiO2 (%)   -- Monitor Sitting Left arm --      Pain Score       --           Vitals:    212   BP: 166/97   Pulse: 67   Patient Position - Orthostatic VS: Sitting         Visual Acuity      ED Medications  Medications - No data to display    Diagnostic Studies  Results Reviewed     Procedure Component Value Units Date/Time    Basic metabolic panel [[id number]]     Lab Status: No result Specimen: Blood     CBC and differential [[id number]]     Lab Status: No result Specimen: Blood                  CT abdomen pelvis wo contrast    (Results Pending)              Procedures  Procedures         ED Course                                           MDM    Disposition  Final diagnoses:   None     ED Disposition     None      Follow-up Information    None         Patient's Medications   Discharge Prescriptions    No medications on file     No discharge procedures on file     PDMP Review     None          ED Provider  Electronically Signed by           Pa Gandhi MD  04/02/21 5972

## 2021-03-31 ENCOUNTER — POSTPARTUM VISIT (OUTPATIENT)
Dept: OBGYN CLINIC | Facility: CLINIC | Age: 36
End: 2021-03-31

## 2021-03-31 VITALS
DIASTOLIC BLOOD PRESSURE: 83 MMHG | BODY MASS INDEX: 31.31 KG/M2 | HEART RATE: 98 BPM | WEIGHT: 155 LBS | SYSTOLIC BLOOD PRESSURE: 117 MMHG

## 2021-03-31 DIAGNOSIS — Z98.891 STATUS POST REPEAT LOW TRANSVERSE CESAREAN SECTION: ICD-10-CM

## 2021-03-31 PROBLEM — O09.529 AMA (ADVANCED MATERNAL AGE) MULTIGRAVIDA 35+: Status: RESOLVED | Noted: 2020-10-15 | Resolved: 2021-03-31

## 2021-03-31 PROBLEM — Z98.84 H/O GASTRIC BYPASS: Status: RESOLVED | Noted: 2019-09-09 | Resolved: 2021-03-31

## 2021-03-31 PROBLEM — A74.9 CHLAMYDIA: Status: RESOLVED | Noted: 2020-10-05 | Resolved: 2021-03-31

## 2021-03-31 PROBLEM — O34.219 PREVIOUS CESAREAN DELIVERY, ANTEPARTUM: Status: RESOLVED | Noted: 2020-09-18 | Resolved: 2021-03-31

## 2021-03-31 PROBLEM — Z3A.39 39 WEEKS GESTATION OF PREGNANCY: Status: RESOLVED | Noted: 2021-03-18 | Resolved: 2021-03-31

## 2021-03-31 PROBLEM — O23.00 PYELONEPHRITIS DURING PREGNANCY: Status: RESOLVED | Noted: 2020-08-01 | Resolved: 2021-03-31

## 2021-03-31 PROBLEM — O99.810 ABNORMAL GLUCOSE AFFECTING PREGNANCY: Status: RESOLVED | Noted: 2021-01-21 | Resolved: 2021-03-31

## 2021-03-31 PROBLEM — O99.210 OBESITY IN PREGNANCY: Status: RESOLVED | Noted: 2019-09-09 | Resolved: 2021-03-31

## 2021-03-31 PROCEDURE — 99213 OFFICE O/P EST LOW 20 MIN: CPT | Performed by: NURSE PRACTITIONER

## 2021-03-31 NOTE — PATIENT INSTRUCTIONS
DESPUÉS DEL PARTO      Debe comunicarse con barriga proveedor de GINECÓLOGO si usted experimenta cualquiera de los siguientes:  1  sangrado que empapa mehnaz almohadilla cada hora sacr 2 horas  2  mal olor procedente de la vagina  3  fiebre de 100 4 o superior  4  incisión o dolor abdominal que no irá lejos a pesar de prescribe medicamentos para el dolor  5  hinchazón, enrojecimiento, secreción o sangrado de la incisión de cesárea o sitio de desgarro perineal   6  la incisión se comienza a separar  7  problemas para orinar incluyendo incapacidad para orinar, ardor al [place] o muy oscura de la [place]  8  no movimiento intestinal dentro de 4 días de kendall a dennis, o la dificultad con las deposiciones después de eso  9  cualquier tipo de disturbio visual (visión doble, Desenfoque, etcetera)  10  cefalea  11  gripe-pearl síntomas  12  dolor o enrojecimiento en angel luis o ambos de baljinder pechos  13  dolor, calor, sensibilidad o hinchazón en las piernas, especialmente la carlyle de la pantorrilla  14  frecuentes náuseas y vómitos  15  signos de depresión o ansiedad  16  Si experimenta corey de pecho o tiene problemas para respirar, llame al 911 inmediatamente  En general puede reanudar el coito sexual después de 6 semanas de la entrega  Es importante continuar cambiando tus toallas sanitarias con frecuencia y limpiar el perineo al menos 2 - 3 veces al día  Mantenga la incisión abdominal después de entrega cesariana limpio y seco en todo momento

## 2021-03-31 NOTE — PROGRESS NOTES
POSTPARTUM VISIT    Juany Guzman presents today for postpartum visit for incision check  She had a  delivery on 3/18/21  Complications included none  She is breast and bottlefeeding her infant and reports no issues with such  She desired surgical sterilization for contraception and it was performed in conjunction with c/s delivery  Review of Systems:   -Constitutional: denies issues, reports incisional pain well-controlled with occasional Tylenol   -Breasts: denies tenderness   -Gynecologic: lochia continues - scant spotting   -Urinary: denies issues urinating   -GI: stools WNL, denies issues    Physical Exam:   -Vitals:   Vitals:    21 1046   BP: 117/83   Pulse: 98   Weight: 70 3 kg (155 lb)      -General: A&Ox3, no acute distress noted   -Abdomen: soft, non-tender, incision appears clean/dry/intact and well-healed   -Extremities: nontender, no edema noted    Assessment/Plan:  1  Normal incision check  2  Return in 3 weeks for full postpartum visit

## 2021-07-08 ENCOUNTER — OFFICE VISIT (OUTPATIENT)
Dept: OBGYN CLINIC | Facility: CLINIC | Age: 36
End: 2021-07-08

## 2021-07-08 ENCOUNTER — TELEPHONE (OUTPATIENT)
Dept: OBGYN CLINIC | Facility: CLINIC | Age: 36
End: 2021-07-08

## 2021-07-08 VITALS
DIASTOLIC BLOOD PRESSURE: 75 MMHG | HEART RATE: 84 BPM | SYSTOLIC BLOOD PRESSURE: 107 MMHG | BODY MASS INDEX: 32.48 KG/M2 | WEIGHT: 160.8 LBS

## 2021-07-08 DIAGNOSIS — B37.9 YEAST INFECTION: ICD-10-CM

## 2021-07-08 DIAGNOSIS — R30.0 DYSURIA: Primary | ICD-10-CM

## 2021-07-08 LAB
SL AMB  POCT GLUCOSE, UA: ABNORMAL
SL AMB LEUKOCYTE ESTERASE,UA: ABNORMAL
SL AMB POCT BILIRUBIN,UA: ABNORMAL
SL AMB POCT BLOOD,UA: ABNORMAL
SL AMB POCT CLARITY,UA: ABNORMAL
SL AMB POCT COLOR,UA: ABNORMAL
SL AMB POCT KETONES,UA: ABNORMAL
SL AMB POCT NITRITE,UA: ABNORMAL
SL AMB POCT PH,UA: 5
SL AMB POCT SPECIFIC GRAVITY,UA: 1.03
SL AMB POCT URINE PROTEIN: ABNORMAL
SL AMB POCT UROBILINOGEN: 0.2

## 2021-07-08 PROCEDURE — 87077 CULTURE AEROBIC IDENTIFY: CPT | Performed by: OBSTETRICS & GYNECOLOGY

## 2021-07-08 PROCEDURE — 81002 URINALYSIS NONAUTO W/O SCOPE: CPT | Performed by: OBSTETRICS & GYNECOLOGY

## 2021-07-08 PROCEDURE — 87086 URINE CULTURE/COLONY COUNT: CPT | Performed by: OBSTETRICS & GYNECOLOGY

## 2021-07-08 PROCEDURE — 99213 OFFICE O/P EST LOW 20 MIN: CPT | Performed by: OBSTETRICS & GYNECOLOGY

## 2021-07-08 PROCEDURE — T1015 CLINIC SERVICE: HCPCS | Performed by: OBSTETRICS & GYNECOLOGY

## 2021-07-08 NOTE — PROGRESS NOTES
GYN Problem Visit  Raoul Sanders is a 37y/o who presents today with complaints of trouble with urination  She states that her symptoms started two days ago with urgency, frequency and dysuria  She also reports malodorous vaginal discharge as well as vaginal itching  She denies fevers or chills  She does endorse pain in her lower abdomen that wraps around the right side and down her leg  Vitals:    07/08/21 1428   BP: 107/75   Pulse: 84     General: no acute distress  SSE: normal appearing cervix, small amount of thick, white discharge    KOH: positive for pseudohyphae  Wet prep: negative for BV  UA: No nitrites, leuks, or blood    A/P: 37y/o with yeast infection  She may also have UTI vs kidney stone, however UA with no nitrites, leukocytes or blood    - Monistat 7 sent to patient pharmacy  - F/u urine culture results  - Patient to return if no improvement of symptoms    Deja Fisher MD, PGY-4  7/8/2021  5:11 PM

## 2021-07-09 ENCOUNTER — TELEPHONE (OUTPATIENT)
Dept: LABOR AND DELIVERY | Facility: HOSPITAL | Age: 36
End: 2021-07-09

## 2021-07-09 DIAGNOSIS — N39.0 UTI (URINARY TRACT INFECTION): Primary | ICD-10-CM

## 2021-07-09 LAB — BACTERIA UR CULT: ABNORMAL

## 2021-07-09 RX ORDER — NITROFURANTOIN 25; 75 MG/1; MG/1
100 CAPSULE ORAL 2 TIMES DAILY
Qty: 14 CAPSULE | Refills: 0 | Status: SHIPPED | OUTPATIENT
Start: 2021-07-09 | End: 2021-07-16

## 2021-07-09 NOTE — TELEPHONE ENCOUNTER
Urine culture and symptoms consistent with UTI  Macrobid sent to patient preferred pharmacy  Patient called and notified      Tisha Junior MD, PGY-4  7/9/2021  6:22 PM

## 2021-09-16 LAB — MISCELLANEOUS LAB TEST RESULT: NORMAL

## 2021-10-19 ENCOUNTER — HOSPITAL ENCOUNTER (EMERGENCY)
Facility: HOSPITAL | Age: 36
Discharge: HOME | End: 2021-10-19
Attending: EMERGENCY MEDICINE | Admitting: EMERGENCY MEDICINE
Payer: COMMERCIAL

## 2021-10-19 VITALS
BODY MASS INDEX: 31.06 KG/M2 | SYSTOLIC BLOOD PRESSURE: 129 MMHG | HEART RATE: 80 BPM | OXYGEN SATURATION: 99 % | RESPIRATION RATE: 16 BRPM | TEMPERATURE: 99.5 F | WEIGHT: 153.8 LBS | DIASTOLIC BLOOD PRESSURE: 76 MMHG

## 2021-10-19 DIAGNOSIS — J02.0 STREP PHARYNGITIS: Primary | ICD-10-CM

## 2021-10-19 DIAGNOSIS — Z20.822 PERSON UNDER INVESTIGATION FOR COVID-19: ICD-10-CM

## 2021-10-19 LAB
FLUAV RNA RESP QL NAA+PROBE: NEGATIVE
FLUBV RNA RESP QL NAA+PROBE: NEGATIVE
RSV RNA RESP QL NAA+PROBE: NEGATIVE
SARS-COV-2 RNA RESP QL NAA+PROBE: NEGATIVE

## 2021-10-19 PROCEDURE — 99283 EMERGENCY DEPT VISIT LOW MDM: CPT

## 2021-10-19 PROCEDURE — 0241U HB NFCT DS VIR RESP RNA 4 TRGT: CPT | Performed by: PHYSICIAN ASSISTANT

## 2021-10-19 PROCEDURE — 99284 EMERGENCY DEPT VISIT MOD MDM: CPT | Performed by: PHYSICIAN ASSISTANT

## 2021-10-19 RX ORDER — AMOXICILLIN AND CLAVULANATE POTASSIUM 875; 125 MG/1; MG/1
1 TABLET, FILM COATED ORAL ONCE
Status: COMPLETED | OUTPATIENT
Start: 2021-10-19 | End: 2021-10-19

## 2021-10-19 RX ORDER — SENNOSIDES 8.6 MG
650 CAPSULE ORAL EVERY 8 HOURS PRN
Qty: 30 TABLET | Refills: 0 | Status: SHIPPED | OUTPATIENT
Start: 2021-10-19

## 2021-10-19 RX ORDER — ACETAMINOPHEN 325 MG/1
650 TABLET ORAL ONCE
Status: COMPLETED | OUTPATIENT
Start: 2021-10-19 | End: 2021-10-19

## 2021-10-19 RX ORDER — AMOXICILLIN AND CLAVULANATE POTASSIUM 875; 125 MG/1; MG/1
1 TABLET, FILM COATED ORAL EVERY 12 HOURS
Qty: 14 TABLET | Refills: 0 | Status: SHIPPED | OUTPATIENT
Start: 2021-10-19 | End: 2021-10-26

## 2021-10-19 RX ADMIN — ACETAMINOPHEN 650 MG: 325 TABLET ORAL at 20:58

## 2021-10-19 RX ADMIN — AMOXICILLIN AND CLAVULANATE POTASSIUM 1 TABLET: 875; 125 TABLET, FILM COATED ORAL at 20:58

## 2022-02-02 ENCOUNTER — OFFICE VISIT (OUTPATIENT)
Dept: OBGYN CLINIC | Facility: CLINIC | Age: 37
End: 2022-02-02

## 2022-02-02 VITALS
DIASTOLIC BLOOD PRESSURE: 79 MMHG | BODY MASS INDEX: 31.51 KG/M2 | WEIGHT: 156 LBS | HEART RATE: 99 BPM | SYSTOLIC BLOOD PRESSURE: 121 MMHG

## 2022-02-02 DIAGNOSIS — N89.8 VAGINAL DISCHARGE: Primary | ICD-10-CM

## 2022-02-02 PROCEDURE — 99213 OFFICE O/P EST LOW 20 MIN: CPT | Performed by: OBSTETRICS & GYNECOLOGY

## 2022-02-02 NOTE — PROGRESS NOTES
Jannie Mcdaniel is a 43 y o  female who presents for month long vaginal discharge with fishy odor  She states that she has intermittent pruritus  She also complained of left ovarian pain which resolved after motrin  Patient denies fever, chills, SOB and CP  She has a history of chlamydia in 9/2020 which was adequately treated  She has no other complaints at this time  Review of Systems   Constitutional: Negative for chills and fever  HENT: Negative for ear pain and sore throat  Eyes: Negative for pain and visual disturbance  Respiratory: Negative for cough and shortness of breath  Cardiovascular: Negative for chest pain and palpitations  Gastrointestinal: Negative for abdominal pain and vomiting  Genitourinary: Positive for vaginal discharge  Negative for dysuria, hematuria, vaginal bleeding and vaginal pain  Musculoskeletal: Negative for arthralgias and back pain  Skin: Negative for color change and rash  Neurological: Negative for seizures and syncope  All other systems reviewed and are negative  Objective      /79   Pulse 99   Wt 70 8 kg (156 lb)   LMP 01/17/2022   BMI 31 51 kg/m²      Physical Exam  Vitals and nursing note reviewed  Constitutional:       General: She is not in acute distress  Appearance: She is well-developed  HENT:      Head: Normocephalic and atraumatic  Eyes:      Conjunctiva/sclera: Conjunctivae normal    Cardiovascular:      Rate and Rhythm: Normal rate and regular rhythm  Heart sounds: No murmur heard  Pulmonary:      Effort: Pulmonary effort is normal  No respiratory distress  Breath sounds: Normal breath sounds  Abdominal:      Palpations: Abdomen is soft  Tenderness: There is no abdominal tenderness  Genitourinary:     General: Normal vulva  Comments: Normal appearing external genitalia, normal appearing cervix, thin white discharge, no odor noted     Musculoskeletal:      Cervical back: Neck supple  Skin:     General: Skin is warm and dry  Neurological:      Mental Status: She is alert     Psychiatric:         Mood and Affect: Mood normal          Behavior: Behavior normal        Wet mount/KOH: no evidence of trichomonas, clue cells and hyphae    Assessment/Plan  Problem List Items Addressed This Visit        Other    Vaginal discharge - Primary     Wet mount/KOH negative  Likely physiologic discharge  F/u in 1 year or if having a problem                 Meli Fraire MD  OB/GYN PGY-1  2/2/2022  10:28 AM

## 2022-12-15 ENCOUNTER — OFFICE VISIT (OUTPATIENT)
Dept: DENTISTRY | Facility: CLINIC | Age: 37
End: 2022-12-15

## 2022-12-15 VITALS — HEART RATE: 99 BPM | DIASTOLIC BLOOD PRESSURE: 81 MMHG | SYSTOLIC BLOOD PRESSURE: 122 MMHG | TEMPERATURE: 98.9 F

## 2022-12-15 DIAGNOSIS — Z01.20 ENCOUNTER FOR DENTAL EXAMINATION: Primary | ICD-10-CM

## 2022-12-15 NOTE — PROGRESS NOTES
Comprehensive Exam & FMX     Cecily Leach presents for a comprehensive exam  Verbal consent for treatment given in addition to the forms  Reviewed health history - Patient is ASA Class I  Consents signed: Yes    Perio: Normal and Gingivitis  Pain Scale: 0  Caries Assessment: medium  Radiographs:  FMX    Oral Hygiene instruction reviewed and given  Hygiene recall visits recommended to the patient  Treatment Plan:  1  Infection control  2  Periodontal therapy: Gingivitis   3  Caries control: moderate   4  Occlusal evaluation Class I     Prognosis is Good    Referrals needed: Extraction #4 root tip, #17 lost crown, & #32   Next visit: Prophy     Treatment plan: 1) Prophy                             2) RCT # 11 then crown                              3) [place] #12 endo                              4) Extractions (need referral for #4,#17 & #32)     Exam by Dr Donna Cummings

## 2023-01-17 ENCOUNTER — OFFICE VISIT (OUTPATIENT)
Dept: DENTISTRY | Facility: CLINIC | Age: 38
End: 2023-01-17

## 2023-01-17 VITALS — TEMPERATURE: 98.2 F | HEART RATE: 80 BPM | DIASTOLIC BLOOD PRESSURE: 73 MMHG | SYSTOLIC BLOOD PRESSURE: 109 MMHG

## 2023-01-17 DIAGNOSIS — Z01.20 ENCOUNTER FOR DENTAL EXAMINATION: Primary | ICD-10-CM

## 2023-01-17 PROBLEM — K05.30 PERIODONTITIS: Status: ACTIVE | Noted: 2023-01-17

## 2023-01-17 NOTE — PROGRESS NOTES
Prophy    Dental procedures in this visit   •  - PROPHYLAXIS - ADULT (Completed)     Service provider: Choco Castelan 195     Billing provider: David Landry DDS   Reviewed medical history   ASA I     Method Used:  · Prophy Method Used: Ultrasonic Scaling  · Hand Scaling  · Polished  · Flossed    Radiographs Taken:  · None    Intra/Extra Oral Cancer Screening:  · Within normal limits    Oral Hygiene:  · Fair    Plaque:  · Generalized    Calculus:  · Light/ Moderate     Bleeding:  · Bleeding on probing: No periodontal exam for this visit  · Light  · Moderate    Stain:  · None    Periodontal Charting:  · Full probing    Periodontal Classification:  · Generalized    Periodontitis Staging/Grading:  · Stagin  · Grading:  A    NV: 1 Endo #11 ( pre-auth at comp exam)   NV: 2 [facility]

## 2023-04-02 ENCOUNTER — APPOINTMENT (EMERGENCY)
Dept: CT IMAGING | Facility: HOSPITAL | Age: 38
End: 2023-04-02

## 2023-04-02 ENCOUNTER — HOSPITAL ENCOUNTER (EMERGENCY)
Facility: HOSPITAL | Age: 38
Discharge: HOME | End: 2023-04-03
Attending: EMERGENCY MEDICINE

## 2023-04-02 DIAGNOSIS — R07.9 CHEST PAIN, UNSPECIFIED TYPE: Primary | ICD-10-CM

## 2023-04-02 LAB
ANION GAP SERPL CALCULATED.3IONS-SCNC: 9 MMOL/L (ref 4–13)
BASOPHILS # BLD AUTO: 0.05 THOUSANDS/ÂΜL (ref 0–0.1)
BASOPHILS NFR BLD AUTO: 1 % (ref 0–1)
BUN SERPL-MCNC: 14 MG/DL (ref 5–25)
CALCIUM SERPL-MCNC: 8.5 MG/DL (ref 8.4–10.2)
CHLORIDE SERPL-SCNC: 107 MMOL/L (ref 96–108)
CO2 SERPL-SCNC: 21 MMOL/L (ref 21–32)
CREAT SERPL-MCNC: 0.71 MG/DL (ref 0.6–1.3)
EOSINOPHIL # BLD AUTO: 0.19 THOUSAND/ÂΜL (ref 0–0.61)
EOSINOPHIL NFR BLD AUTO: 3 % (ref 0–6)
ERYTHROCYTE [DISTWIDTH] IN BLOOD BY AUTOMATED COUNT: 17.9 % (ref 11.6–15.1)
EXT PREGNANCY TEST URINE: NEGATIVE
EXT. CONTROL: NORMAL
GFR SERPL CREATININE-BSD FRML MDRD: 107 ML/MIN/1.73SQ M
GLUCOSE SERPL-MCNC: 101 MG/DL (ref 65–140)
HCT VFR BLD AUTO: 28.6 % (ref 34.8–46.1)
HGB BLD-MCNC: 7.8 G/DL (ref 11.5–15.4)
IMM GRANULOCYTES # BLD AUTO: 0.01 THOUSAND/UL (ref 0–0.2)
IMM GRANULOCYTES NFR BLD AUTO: 0 % (ref 0–2)
LYMPHOCYTES # BLD AUTO: 3.58 THOUSANDS/ÂΜL (ref 0.6–4.47)
LYMPHOCYTES NFR BLD AUTO: 49 % (ref 14–44)
MCH RBC QN AUTO: 20.6 PG (ref 26.8–34.3)
MCHC RBC AUTO-ENTMCNC: 27.3 G/DL (ref 31.4–37.4)
MCV RBC AUTO: 76 FL (ref 82–98)
MONOCYTES # BLD AUTO: 0.52 THOUSAND/ÂΜL (ref 0.17–1.22)
MONOCYTES NFR BLD AUTO: 7 % (ref 4–12)
NEUTROPHILS # BLD AUTO: 2.88 THOUSANDS/ÂΜL (ref 1.85–7.62)
NEUTS SEG NFR BLD AUTO: 40 % (ref 43–75)
NRBC BLD AUTO-RTO: 0 /100 WBCS
PLATELET # BLD AUTO: 249 THOUSANDS/UL (ref 149–390)
PMV BLD AUTO: 10.9 FL (ref 8.9–12.7)
POTASSIUM SERPL-SCNC: 3.7 MMOL/L (ref 3.5–5.3)
RBC # BLD AUTO: 3.78 MILLION/UL (ref 3.81–5.12)
SODIUM SERPL-SCNC: 137 MMOL/L (ref 135–147)
WBC # BLD AUTO: 7.23 THOUSAND/UL (ref 4.31–10.16)

## 2023-04-02 RX ORDER — KETOROLAC TROMETHAMINE 30 MG/ML
15 INJECTION, SOLUTION INTRAMUSCULAR; INTRAVENOUS ONCE
Status: COMPLETED | OUTPATIENT
Start: 2023-04-03 | End: 2023-04-03

## 2023-04-02 RX ADMIN — IOHEXOL 85 ML: 350 INJECTION, SOLUTION INTRAVENOUS at 22:54

## 2023-04-02 RX ADMIN — MORPHINE SULFATE 2 MG: 2 INJECTION, SOLUTION INTRAMUSCULAR; INTRAVENOUS at 22:40

## 2023-04-02 RX ADMIN — SODIUM CHLORIDE 1000 ML: 0.9 INJECTION, SOLUTION INTRAVENOUS at 22:39

## 2023-04-03 VITALS
WEIGHT: 152.78 LBS | BODY MASS INDEX: 30.86 KG/M2 | RESPIRATION RATE: 18 BRPM | SYSTOLIC BLOOD PRESSURE: 127 MMHG | TEMPERATURE: 97.8 F | HEART RATE: 86 BPM | OXYGEN SATURATION: 98 % | DIASTOLIC BLOOD PRESSURE: 74 MMHG

## 2023-04-03 LAB
ATRIAL RATE: 83 BPM
P AXIS: 67 DEGREES
PR INTERVAL: 162 MS
QRS AXIS: 8 DEGREES
QRSD INTERVAL: 82 MS
QT INTERVAL: 372 MS
QTC INTERVAL: 437 MS
T WAVE AXIS: 29 DEGREES
VENTRICULAR RATE: 83 BPM

## 2023-04-03 RX ORDER — NAPROXEN 500 MG/1
500 TABLET ORAL 2 TIMES DAILY WITH MEALS
Qty: 14 TABLET | Refills: 0 | Status: SHIPPED | OUTPATIENT
Start: 2023-04-03 | End: 2023-04-10

## 2023-04-03 RX ADMIN — KETOROLAC TROMETHAMINE 15 MG: 30 INJECTION, SOLUTION INTRAMUSCULAR; INTRAVENOUS at 00:00

## 2023-04-03 NOTE — DISCHARGE INSTRUCTIONS
Your work-up at this time is reassuring recommend follow-up with your primary care physician you may take the antibiotic medication as prescribed if you develop any new or worsening symptoms please return to the ED for further evaluation
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
## 2023-04-04 ENCOUNTER — VBI (OUTPATIENT)
Dept: PALLIATIVE MEDICINE | Facility: CLINIC | Age: 38
End: 2023-04-04

## 2023-04-04 NOTE — TELEPHONE ENCOUNTER
ED Visit Information     Ed visit date: 4/2/2023  Diagnosis Description: Chest Pain  In Network? Yes [facility]  Discharge status: Home  Discharged with meds ? Yes  Number of ED visits to date: 1  ED Severity:1     Outreach Information    Outreach successful: Yes 1  Date letter mailed:No  Date Finalized:4/4/2023    Care Coordination    Follow up appointment with pcp: no pt left message with PCP waiting for a return call  Transportation issues ?  No    Value Base Outreach    Emergent necessity warranted by diagnosis:  Yes  [facility] PCP:  Yes  Transportation:  Friend/Family Transport  Called PCP first?:  No  Feels able to call PCP for urgent problems ?:  Yes  Understands what emergencies can be handled by PCP ?:  Yes  Ever any problems getting appointment with PCP for minor emergency/urgency problems?:  No  Practice Contacted Patient ?:  No  Pt had ED follow up with pcp/staff ?:  No    Seen for follow-up out of network ?:  No  Urgent care Education?:  Yes

## 2023-06-05 ENCOUNTER — OFFICE VISIT (OUTPATIENT)
Dept: DENTISTRY | Facility: CLINIC | Age: 38
End: 2023-06-05

## 2023-06-05 VITALS — HEART RATE: 82 BPM | SYSTOLIC BLOOD PRESSURE: 117 MMHG | DIASTOLIC BLOOD PRESSURE: 79 MMHG

## 2023-06-05 DIAGNOSIS — K02.9 CARIES: Primary | ICD-10-CM

## 2023-06-05 PROCEDURE — WIS3000 RC ACCESS

## 2023-06-05 NOTE — PROGRESS NOTES
RCT - Stage 1    Darianyazmin Hensleyoter presents for stage I endo #11 PMH reviewed, no changes  ASA 1, no pain    Applied topical benzocaine, administered 1 carps 4% articaine 1:100k epi via infiltration   Clamp and rubber dam placed  Caries removed and pulp chamber accessed with round carbide    Working length determined with apex locater 27 and instrumented with wave primary to that length, no size verifier past 25 mm so used comfort cone, to verify WL- appears short, and remainder of canal cannot be seen radiographcally attempted to place hand file past length of 27 (#8 mm file) however radiograph was difficult to capture even with attending's help  Size verifier was too short (up to 25 mm only) and no longer size available  At [facility] should re confirm WL radiographically  Dried canal with paper points  Placed CaOH2, cotton pellet, and restored with GI    ?  ?  NV: rotary file and obturation #11  sesar present  Pt left ambulatory and satisfied

## 2023-07-20 ENCOUNTER — OFFICE VISIT (OUTPATIENT)
Dept: DENTISTRY | Facility: CLINIC | Age: 38
End: 2023-07-20

## 2023-07-20 VITALS — SYSTOLIC BLOOD PRESSURE: 112 MMHG | HEART RATE: 92 BPM | DIASTOLIC BLOOD PRESSURE: 76 MMHG

## 2023-07-20 DIAGNOSIS — Z01.20 ENCOUNTER FOR DENTAL EXAMINATION: Primary | ICD-10-CM

## 2023-07-20 PROCEDURE — D0120 PERIODIC ORAL EVALUATION - ESTABLISHED PATIENT: HCPCS

## 2023-07-20 PROCEDURE — D1110 PROPHYLAXIS - ADULT: HCPCS

## 2023-07-20 NOTE — DENTAL PROCEDURE DETAILS
Nicolás Sr presents for a Periodic exam. Verbal consent for treatment given in addition to the forms. Reviewed health history - Patient is ASA I  Consents signed: Yes     Perio: Generalized and Slight bleeding  Pain Scale: 0  Caries Assessment: Low  Radiographs: None     Oral Hygiene instruction reviewed and given. Recommended Hygiene recall visits with the [facility]. Treatment Plan:  1. Infection control: OHI  2. Periodontal therapy: adult prophy  3. Caries control: as charted  4. Occlusal evaluation:   5. Case Difficulty Type 1  Prognosis is Good.   Referrals needed: No  Next Visit:  NV: rotary file and obturation #11 - 90 min with Dr. Flood Never when he is here in August  NV2:  Ext #4  6 [facility]C - due 01/2024

## 2023-07-20 NOTE — DENTAL PROCEDURE DETAILS
Prophylaxis completed with hand instrumentation. Slight to moderate generalized bleeding,  plaque and supragingival/subgingival  calculus removed. Polished with prophy cup and paste. Flossed and provided Oral Health Instructions. Demonstrated proper brushing and flossing technique. Patient left satisfied and ambulatory.

## 2023-09-06 ENCOUNTER — OFFICE VISIT (OUTPATIENT)
Dept: OBGYN CLINIC | Facility: CLINIC | Age: 38
End: 2023-09-06

## 2023-09-06 VITALS
BODY MASS INDEX: 30.12 KG/M2 | HEART RATE: 90 BPM | DIASTOLIC BLOOD PRESSURE: 75 MMHG | HEIGHT: 59 IN | WEIGHT: 149.4 LBS | SYSTOLIC BLOOD PRESSURE: 110 MMHG

## 2023-09-06 DIAGNOSIS — B37.9 YEAST INFECTION: Primary | ICD-10-CM

## 2023-09-06 LAB
BV WHIFF TEST VAG QL: NEGATIVE
CLUE CELLS SPEC QL WET PREP: NEGATIVE
PH SMN: 4.5 [PH]
SL AMB  POCT GLUCOSE, UA: NORMAL
SL AMB LEUKOCYTE ESTERASE,UA: NORMAL
SL AMB POCT BILIRUBIN,UA: NORMAL
SL AMB POCT BLOOD,UA: NORMAL
SL AMB POCT CLARITY,UA: NORMAL
SL AMB POCT COLOR,UA: NORMAL
SL AMB POCT KETONES,UA: NORMAL
SL AMB POCT NITRITE,UA: NORMAL
SL AMB POCT PH,UA: 6
SL AMB POCT SPECIFIC GRAVITY,UA: 1.02
SL AMB POCT URINE PROTEIN: NORMAL
SL AMB POCT UROBILINOGEN: 0.2
SL AMB POCT WET MOUNT: ABNORMAL
T VAGINALIS VAG QL WET PREP: NEGATIVE
YEAST VAG QL WET PREP: POSITIVE

## 2023-09-06 PROCEDURE — 87210 SMEAR WET MOUNT SALINE/INK: CPT | Performed by: NURSE PRACTITIONER

## 2023-09-06 PROCEDURE — 81002 URINALYSIS NONAUTO W/O SCOPE: CPT | Performed by: NURSE PRACTITIONER

## 2023-09-06 PROCEDURE — 99213 OFFICE O/P EST LOW 20 MIN: CPT | Performed by: NURSE PRACTITIONER

## 2023-09-06 RX ORDER — FLUCONAZOLE 150 MG/1
150 TABLET ORAL ONCE
Qty: 1 TABLET | Refills: 1 | Status: SHIPPED | OUTPATIENT
Start: 2023-09-06 | End: 2023-09-06

## 2023-09-06 NOTE — PATIENT INSTRUCTIONS
Take Fluconazole as directed   Wear loose clothes and cotton underwear  Call with needs or concerns  Annual GYN exam is due after 9/18/2023

## 2023-09-06 NOTE — PROGRESS NOTES
Assessment/Plan:     Diagnoses and all orders for this visit:    Yeast infection  -     fluconazole (DIFLUCAN) 150 mg tablet; Take 1 tablet (150 mg total) by mouth once for 1 dose  -     POCT wet mount      Plan  Take Fluconazole as directed   Wear loose clothes and cotton underwear  Call with needs or concerns  Annual GYN exam is due after 9/18/2023  Pt verbalized understanding of all discussed. Subjective:      Patient ID: Kieran Rowe is a 34 y.o. female. HPI   Pt presents with C/O intermittent vaginal discharge and vulvar itching for 2 weeks  Last WNL PAP and negative HPVwas 9/18/2020    Explained wet mount was positive for yeast, safe and effective use of Fluconazole was provided, discussed comfort measures      The following portions of the patient's history were reviewed and updated as appropriate: allergies, current medications, past family history, past medical history, past social history, past surgical history and problem list.    Review of Systems    . Pertinent items are note in the HPI      Objective:      /75   Pulse 90   Ht 4' 11" (1.499 m)   Wt 67.8 kg (149 lb 6.4 oz)   LMP 08/23/2023   BMI 30.18 kg/m²          Physical Exam    Alert and oriented  Denies pain  WNL respiratory effort, negative cough or SOB  Vulva negative lesions, slight erythema  Vagina erythema, positive thick white discharge   Cervix positive thick white discharge, negative lesions  Wet mount positive for yeast

## 2023-09-28 ENCOUNTER — TELEPHONE (OUTPATIENT)
Dept: OBGYN CLINIC | Facility: CLINIC | Age: 38
End: 2023-09-28

## 2023-10-20 ENCOUNTER — OFFICE VISIT (OUTPATIENT)
Dept: DENTISTRY | Facility: CLINIC | Age: 38
End: 2023-10-20

## 2023-10-20 VITALS — HEART RATE: 101 BPM | DIASTOLIC BLOOD PRESSURE: 69 MMHG | SYSTOLIC BLOOD PRESSURE: 105 MMHG

## 2023-10-20 DIAGNOSIS — K04.7 ABSCESS OF APEX OF DENTAL ROOT COMPLICATING CHRONIC INFLAMMATION: Primary | ICD-10-CM

## 2023-10-20 DIAGNOSIS — Z01.21 ENCOUNTER FOR DENTAL EXAMINATION AND CLEANING WITH ABNORMAL FINDINGS: ICD-10-CM

## 2023-10-20 RX ORDER — AMOXICILLIN 500 MG/1
500 CAPSULE ORAL EVERY 8 HOURS SCHEDULED
Qty: 21 CAPSULE | Refills: 0 | Status: SHIPPED | OUTPATIENT
Start: 2023-10-20 | End: 2023-10-27

## 2023-10-20 NOTE — DENTAL PROCEDURE DETAILS
Emergency Limited Oral Evaluation  Patient Madison MONGE Anais) presents for her planned treatment for RCT of tooth #11 but patient reported toothache of upper right back tooth and wanted to address it. Patient consent treatment. [facility], no changes. ASA: I.   Pain score: 3  Chief complain: "My upper right back tooth is in pain". HPI" Patient reported spontaneous continuous throbbing of tooth #4. Pain upon biting and wake her up at night. Patient reported she was aware about need of extraction of this broken tooth. Patient currently taking Tylenol medications for pain. Radiographs: PA x-rays #4 and #11. EOE: WNL. IOE/Assessment/Plan:   1- Tooth #4: chief complain of patient today. Severely broken and decayed leaving retained roots of past history of RCT. PAL is evident. Tooth is very tender to palpation and percussion. DX: Chronic apical abscess of non restorable retained roots. Recommended extractions. RX: Amoxicillin 500 mg, 21 caps, TID, for 7 days, no refills. Patient denies allergy. 2- Tooth #11: past history of started RCT by other provider but no finished. Asymptomatic. Informed patient needs to finish RCT. (Needs long File to re-measure WL). 3- Tooth #12 existing RCT and crown with PAL. Needs new x-ray exam. Informed patient about possible need of extraction. POI is given. Patient left satisfied and ambulatory. NV1: Extraction tooth #4. NV2: RCT tooth #11.

## 2023-10-30 ENCOUNTER — OFFICE VISIT (OUTPATIENT)
Dept: DENTISTRY | Facility: CLINIC | Age: 38
End: 2023-10-30

## 2023-10-30 VITALS — TEMPERATURE: 97.8 F | DIASTOLIC BLOOD PRESSURE: 80 MMHG | SYSTOLIC BLOOD PRESSURE: 115 MMHG | HEART RATE: 87 BPM

## 2023-10-30 DIAGNOSIS — K04.7 CHRONIC APICAL ABSCESS: Primary | ICD-10-CM

## 2023-10-30 PROCEDURE — D7140 EXTRACTION, ERUPTED TOOTH OR EXPOSED ROOT (ELEVATION AND/OR FORCEPS REMOVAL): HCPCS | Performed by: DENTIST

## 2023-10-30 NOTE — DENTAL PROCEDURE DETAILS
Oral Surgery Extraction Tooth #4    Navya Brown presents for Ext #4  [facility], patient denies any changes. Obtained a direct and personal consent. Risks and complications were explained. Pt agreed and consented. Consent scanned in doc center. Pre-Op BP WNL. DX: Severely broken and decayed leaving retained roots of past history of RCT. PAL is evident of non restorable tooth #4. Administered one carp of 2 % Lidocaine w/ 1:100,000 epi via maxillary infiltration. ? Adequate anesthesia obtained, reflected gingiva, elevated, and extracted #4 . Socket irrigated, and 4.0 absorbable chromic gut sutures placed. Upon dismissal, patient received POI, ice, and gauze. Patient left satisfied and ambulatory. NV1: Finish RCT of Tooth #11 initially started by Dr. Jes Rodrigues (Need long files re-measure WL). NV2:  Review Treatment Plan and X-rays #5 and #12.

## 2023-11-07 ENCOUNTER — OFFICE VISIT (OUTPATIENT)
Dept: DENTISTRY | Facility: CLINIC | Age: 38
End: 2023-11-07

## 2023-11-07 VITALS — HEART RATE: 76 BPM | TEMPERATURE: 96.9 F | SYSTOLIC BLOOD PRESSURE: 106 MMHG | DIASTOLIC BLOOD PRESSURE: 73 MMHG

## 2023-11-07 DIAGNOSIS — K04.4 ACUTE APICAL PERIODONTITIS OF PULPAL ORIGIN: Primary | ICD-10-CM

## 2023-11-07 DIAGNOSIS — K08.89 NON-RESTORABLE TOOTH: ICD-10-CM

## 2023-11-07 PROCEDURE — D3310 ENDODONTIC THERAPY, ANTERIOR TOOTH (EXCLUDING FINAL RESTORATION): HCPCS

## 2023-11-07 NOTE — DENTAL PROCEDURE DETAILS
RCT - Stage 2    Mercy Haas is a 33 y/o F that presents to [facility]  for completing #11 RCT. Reviewed health history, no changes: ASA II   Treatment consents signed: Yes  Perio: 2A  Pain scale: 0/10  Caries risk assessment:High   Radiographs: Films are current   Oral Hygiene instructions reviewed and given. Hygiene recall visits recommended to patient. Applied topical benzocaine, administered 0.5 carps of 4% articaine 1:100k epi via buccal and palatal local infiltration. Clamp and rubber dam placed. Removed temporary filling and cotton pellet. CaOH removed and re-establish working length to MAF hand file. Working length confirmed with MAF and apex  to be 27mm with reference of buccal cusp. Confirmed with PA. Rotary filed with Primary waveone with RC prep and NaOCL irrigation. Confirmed length of 27mm with 25 file and another x-ray. Dried canal with paper points, placed sealer with paper point. Obturated canals and removed excess carrier with preppi bur. Restored with Glass ionomer and occlusion verified. Obtained PA radiograph. Obturation is dense with no porosities and filled to apex, minor sealer extrusion present. Patient asked about replacing missing teeth and said she is interested in partial dentures. Explained to patient that we must complete extractions and crowns prior to partial dentures. Patient left satisfied and ambulatory. Attending: Dr. Jose Alberto Valladares   NV: review treatment plan and x-rays for #5 and #12, extraction if time allows     MAHENDRA Figueroa

## 2024-10-06 ENCOUNTER — HOSPITAL ENCOUNTER (EMERGENCY)
Facility: HOSPITAL | Age: 39
Discharge: HOME | End: 2024-10-06
Attending: EMERGENCY MEDICINE
Payer: COMMERCIAL

## 2024-10-06 VITALS
DIASTOLIC BLOOD PRESSURE: 74 MMHG | TEMPERATURE: 98.2 F | RESPIRATION RATE: 18 BRPM | WEIGHT: 141 LBS | BODY MASS INDEX: 28.48 KG/M2 | OXYGEN SATURATION: 99 % | HEART RATE: 78 BPM | SYSTOLIC BLOOD PRESSURE: 111 MMHG

## 2024-10-06 DIAGNOSIS — M62.838 TRAPEZIUS MUSCLE SPASM: Primary | ICD-10-CM

## 2024-10-06 LAB
EXT PREGNANCY TEST URINE: NEGATIVE
EXT. CONTROL: NORMAL

## 2024-10-06 PROCEDURE — 96372 THER/PROPH/DIAG INJ SC/IM: CPT

## 2024-10-06 PROCEDURE — 81025 URINE PREGNANCY TEST: CPT | Performed by: EMERGENCY MEDICINE

## 2024-10-06 PROCEDURE — 99284 EMERGENCY DEPT VISIT MOD MDM: CPT | Performed by: EMERGENCY MEDICINE

## 2024-10-06 PROCEDURE — 99283 EMERGENCY DEPT VISIT LOW MDM: CPT

## 2024-10-06 RX ORDER — IBUPROFEN 600 MG/1
600 TABLET, FILM COATED ORAL EVERY 6 HOURS PRN
Qty: 30 TABLET | Refills: 0 | Status: SHIPPED | OUTPATIENT
Start: 2024-10-06

## 2024-10-06 RX ORDER — KETOROLAC TROMETHAMINE 30 MG/ML
15 INJECTION, SOLUTION INTRAMUSCULAR; INTRAVENOUS ONCE
Status: COMPLETED | OUTPATIENT
Start: 2024-10-06 | End: 2024-10-06

## 2024-10-06 RX ORDER — ACETAMINOPHEN 325 MG/1
975 TABLET ORAL EVERY 6 HOURS PRN
Qty: 30 TABLET | Refills: 0 | Status: SHIPPED | OUTPATIENT
Start: 2024-10-06

## 2024-10-06 RX ORDER — CYCLOBENZAPRINE HCL 10 MG
10 TABLET ORAL 2 TIMES DAILY PRN
Qty: 20 TABLET | Refills: 0 | Status: SHIPPED | OUTPATIENT
Start: 2024-10-06

## 2024-10-06 RX ORDER — ACETAMINOPHEN 325 MG/1
975 TABLET ORAL ONCE
Status: COMPLETED | OUTPATIENT
Start: 2024-10-06 | End: 2024-10-06

## 2024-10-06 RX ORDER — CYCLOBENZAPRINE HCL 10 MG
10 TABLET ORAL ONCE
Status: COMPLETED | OUTPATIENT
Start: 2024-10-06 | End: 2024-10-06

## 2024-10-06 RX ADMIN — KETOROLAC TROMETHAMINE 15 MG: 30 INJECTION, SOLUTION INTRAMUSCULAR; INTRAVENOUS at 20:51

## 2024-10-06 RX ADMIN — ACETAMINOPHEN 975 MG: 325 TABLET ORAL at 20:51

## 2024-10-06 RX ADMIN — CYCLOBENZAPRINE HYDROCHLORIDE 10 MG: 10 TABLET, FILM COATED ORAL at 20:51

## 2024-10-06 NOTE — Clinical Note
Carla Manzo was seen and treated in our emergency department on 10/6/2024.                Diagnosis:     Carla  .    She may return on this date: 10/09/2024         If you have any questions or concerns, please don't hesitate to call.      Vinicio Jones, DO    ______________________________           _______________          _______________  Hospital Representative                              Date                                Time

## 2024-10-07 NOTE — ED PROVIDER NOTES
Final diagnoses:   Trapezius muscle spasm     ED Disposition       ED Disposition   Discharge    Condition   Stable    Date/Time   Sun Oct 6, 2024  8:26 PM    Comment   ElizabethYOSSI Manzo discharge to home/self care.                   Assessment & Plan       Medical Decision Making     Reviewed past medical records: Yes no recent hospitalizations noted     History Provided by: The patient     Differential considered: Fracture, dislocation, meningitis, muscle spasm     Consideration of tests: No fever no abnormal vital signs no altered mental status no signs of trauma.  Likely muscle spasm.  Continue anti-inflammatories will add muscle relaxer.  Follow-up with PCP.       I have reviewed the patient's visit and any testing done in the emergency department.  They have verbalized their understanding of any testing done today and have no further questions or concerns regarding their care in the emergency room.  They will follow up with their primary care physician as well as with any specialist in their discharge instructions.  Strict return precautions were discussed.    Amount and/or Complexity of Data Reviewed  Labs: ordered.    Risk  OTC drugs.  Prescription drug management.             Medications   ketorolac (TORADOL) injection 15 mg (15 mg Intramuscular Given 10/6/24 2051)   cyclobenzaprine (FLEXERIL) tablet 10 mg (10 mg Oral Given 10/6/24 2051)   acetaminophen (TYLENOL) tablet 975 mg (975 mg Oral Given 10/6/24 2051)       ED Risk Strat Scores                           SBIRT 20yo+      Flowsheet Row Most Recent Value   Initial Alcohol Screen: US AUDIT-C     1. How often do you have a drink containing alcohol? 0 Filed at: 10/06/2024 1940   2. How many drinks containing alcohol do you have on a typical day you are drinking?  0 Filed at: 10/06/2024 1940   3b. FEMALE Any Age, or MALE 65+: How often do you have 4 or more drinks on one occassion? 0 Filed at: 10/06/2024 1940   Audit-C Score 0 Filed at: 10/06/2024     JUAN: How many times in the past year have you...    Used an illegal drug or used a prescription medication for non-medical reasons? Never Filed at: 10/06/2024 1940                            History of Present Illness       Chief Complaint   Patient presents with    Neck Pain     Right side neck pain from top of right side of head to elbow intermittently x3 days. Used lidocaine patch with some relief. No other meds for pain. No trauma/falls.        Past Medical History:   Diagnosis Date    34 weeks gestation of pregnancy 2021    No known health problems       Past Surgical History:   Procedure Laterality Date     SECTION  , ,     [place]  DELIVERY ONLY N/A 3/18/2021    Procedure:  SECTION () REPEAT;  Surgeon: Sylvia IBARRA MD;  Location: [facility];  Service: Obstetrics    SLEEVE GASTROPLASTY  2018    TUBAL LIGATION N/A 3/18/2021    Procedure: LIGATION/COAGULATION TUBAL;  Surgeon: Sylvia IBARRA MD;  Location: [facility];  Service: Obstetrics      Family History   Problem Relation Age of Onset    Diabetes Mother     Diabetes Father     No Known Problems Sister     No Known Problems Brother     No Known Problems Daughter     No Known Problems Son     Cancer Neg Hx     Breast cancer Neg Hx       Social History     Tobacco Use    Smoking status: Never    Smokeless tobacco: Never   Vaping Use    Vaping status: Never Used   Substance Use Topics    Alcohol use: Not Currently    Drug use: Never      E-Cigarette/Vaping    E-Cigarette Use Never User       E-Cigarette/Vaping Substances    Nicotine No     THC No     CBD No     Flavoring No     Other No     Unknown No       I have reviewed and agree with the history as documented.     Patient presents with:  Neck Pain: Right side neck pain from top of right side of head to elbow intermittently x3 days. Used lidocaine patch with some relief. No other meds for pain. No trauma/falls.     Patient has been having  musculoskeletal neck pain for the past 3 days.  Has been using a lidocaine patch and OTC medications with minimal relief.  Works in a warehouse doing repetitive motion.  Does not recall any traumatic events.  No fevers no change in mental status no vision changes no chest pain or shortness of breath.  No previous surgeries to her spine.  No loss of bowel or bladder control.        Review of Systems   Constitutional: Negative.  Negative for chills and fever.   HENT: Negative.  Negative for rhinorrhea, sore throat, trouble swallowing and voice change.    Eyes: Negative.  Negative for pain and visual disturbance.   Respiratory: Negative.  Negative for cough, shortness of breath and wheezing.    Cardiovascular: Negative.  Negative for chest pain and palpitations.   Gastrointestinal:  Negative for abdominal pain, diarrhea, nausea and vomiting.   Genitourinary: Negative.  Negative for dysuria and frequency.   Musculoskeletal:  Positive for arthralgias. Negative for neck pain and neck stiffness.   Skin: Negative.  Negative for rash.   Neurological: Negative.  Negative for dizziness, speech difficulty, weakness, light-headedness and numbness.           Objective       ED Triage Vitals   Temperature Pulse Blood Pressure Respirations SpO2 Patient Position - Orthostatic VS   10/06/24 1935 10/06/24 1935 10/06/24 1935 10/06/24 1935 10/06/24 1935 10/06/24 1935   98.2 °F (36.8 °C) 78 111/74 18 99 % Sitting      Temp Source Heart Rate Source BP Location FiO2 (%) Pain Score    10/06/24 1935 10/06/24 1935 10/06/24 1935 -- 10/06/24 2051    Oral Monitor Left arm  7      Vitals      Date and Time Temp Pulse SpO2 Resp BP Pain Score FACES Pain Rating User   10/06/24 2051 -- -- -- -- -- 7 -- BY   10/06/24 1935 98.2 °F (36.8 °C) 78 99 % 18 111/74 -- -- KA            Physical Exam  Vitals and nursing note reviewed.   Constitutional:       General: She is not in acute distress.     Appearance: She is well-developed.   HENT:      Head:  Normocephalic and atraumatic.   Eyes:      Conjunctiva/sclera: Conjunctivae normal.      Pupils: Pupils are equal, round, and reactive to light.   Neck:      Trachea: No tracheal deviation.     Cardiovascular:      Rate and Rhythm: Normal rate and regular rhythm.   Pulmonary:      Effort: Pulmonary effort is normal. No respiratory distress.      Breath sounds: Normal breath sounds. No wheezing or rales.   Abdominal:      General: Bowel sounds are normal. There is no distension.      Palpations: Abdomen is soft.      Tenderness: There is no abdominal tenderness. There is no guarding or rebound.   Musculoskeletal:         General: No tenderness or deformity. Normal range of motion.      Cervical back: Full passive range of motion without pain, normal range of motion and neck supple.   Skin:     General: Skin is warm and dry.      Capillary Refill: Capillary refill takes less than 2 seconds.      Findings: No rash.   Neurological:      Mental Status: She is alert and oriented to person, place, and time.   Psychiatric:         Behavior: Behavior normal.         Results Reviewed       Procedure Component Value Units Date/Time    POCT pregnancy, urine [[id number]]  (Normal) Resulted: 10/06/24 2033    Lab Status: Final result Updated: 10/06/24 2033     EXT Preg Test, Ur Negative     Control Valid            No orders to display       Procedures    ED Medication and Procedure Management   Prior to Admission Medications   Prescriptions Last Dose Informant Patient Reported? Taking?   acetaminophen (TYLENOL) 325 mg tablet   No No   Sig: Take 2 tablets (650 mg total) by mouth every 6 (six) hours as needed for moderate pain or headaches   Patient not taking: Reported on 12/15/2022   acetaminophen (TYLENOL) 650 mg CR tablet   No No   Sig: Take 1 tablet (650 mg total) by mouth every 8 (eight) hours as needed for mild pain   Patient not taking: Reported on 12/15/2022   miconazole (MONISTAT-7) 2 % vaginal cream   No No   Sig: Insert  1 applicator into the vagina daily at bedtime   Patient not taking: Reported on 1/17/2023   naproxen (Naprosyn) 500 mg tablet   No No   Sig: Take 1 tablet (500 mg total) by mouth 2 (two) times a day with meals for 7 days      Facility-Administered Medications: None     Discharge Medication List as of 10/6/2024  9:01 PM        START taking these medications    Details   !! acetaminophen (TYLENOL) 325 mg tablet Take 3 tablets (975 mg total) by mouth every 6 (six) hours as needed for mild pain, Starting Sun 10/6/2024, Normal      cyclobenzaprine (FLEXERIL) 10 mg tablet Take 1 tablet (10 mg total) by mouth 2 (two) times a day as needed for muscle spasms, Starting Sun 10/6/2024, Normal      ibuprofen (MOTRIN) 600 mg tablet Take 1 tablet (600 mg total) by mouth every 6 (six) hours as needed for mild pain, Starting Sun 10/6/2024, Normal       !! - Potential duplicate medications found. Please discuss with provider.        CONTINUE these medications which have NOT CHANGED    Details   !! acetaminophen (TYLENOL) 325 mg tablet Take 2 tablets (650 mg total) by mouth every 6 (six) hours as needed for moderate pain or headaches, Starting Sat 3/20/2021, Normal      acetaminophen (TYLENOL) 650 mg CR tablet Take 1 tablet (650 mg total) by mouth every 8 (eight) hours as needed for mild pain, Starting Tue 10/19/2021, Normal      miconazole (MONISTAT-7) 2 % vaginal cream Insert 1 applicator into the vagina daily at bedtime, Starting Thu 7/8/2021, Normal      naproxen (Naprosyn) 500 mg tablet Take 1 tablet (500 mg total) by mouth 2 (two) times a day with meals for 7 days, Starting Mon 4/3/2023, Until Mon 4/10/2023, Normal       !! - Potential duplicate medications found. Please discuss with provider.        No discharge procedures on file.  ED SEPSIS DOCUMENTATION   Time reflects when diagnosis was documented in both MDM as applicable and the Disposition within this note       Time User Action Codes Description Comment    10/6/2024   8:26 PM Vinicio Jones Add [M62.838] Trapezius muscle spasm                  Vinicio Jones DO  10/07/24 1800

## 2025-02-14 ENCOUNTER — APPOINTMENT (EMERGENCY)
Dept: ULTRASOUND IMAGING | Facility: HOSPITAL | Age: 40
End: 2025-02-14
Payer: COMMERCIAL

## 2025-02-14 ENCOUNTER — HOSPITAL ENCOUNTER (EMERGENCY)
Facility: HOSPITAL | Age: 40
Discharge: HOME | End: 2025-02-14
Attending: EMERGENCY MEDICINE
Payer: COMMERCIAL

## 2025-02-14 VITALS
TEMPERATURE: 98.3 F | HEART RATE: 71 BPM | OXYGEN SATURATION: 100 % | SYSTOLIC BLOOD PRESSURE: 124 MMHG | RESPIRATION RATE: 18 BRPM | DIASTOLIC BLOOD PRESSURE: 84 MMHG | WEIGHT: 150.79 LBS | BODY MASS INDEX: 30.46 KG/M2

## 2025-02-14 DIAGNOSIS — R10.2 PELVIC PAIN: Primary | ICD-10-CM

## 2025-02-14 LAB
BACTERIA UR QL AUTO: ABNORMAL /HPF
BILIRUB UR QL STRIP: NEGATIVE
CLARITY UR: CLEAR
COLOR UR: ABNORMAL
EXT PREGNANCY TEST URINE: NEGATIVE
EXT. CONTROL: NORMAL
GLUCOSE UR STRIP-MCNC: NEGATIVE MG/DL
HGB UR QL STRIP.AUTO: 10
KETONES UR STRIP-MCNC: NEGATIVE MG/DL
LEUKOCYTE ESTERASE UR QL STRIP: NEGATIVE
MUCOUS THREADS UR QL AUTO: ABNORMAL
NITRITE UR QL STRIP: NEGATIVE
NON-SQ EPI CELLS URNS QL MICRO: ABNORMAL /HPF
PH UR STRIP.AUTO: 6 [PH]
PROT UR STRIP-MCNC: NEGATIVE MG/DL
RBC #/AREA URNS AUTO: ABNORMAL /HPF
SP GR UR STRIP.AUTO: 1.01 (ref 1–1.04)
UROBILINOGEN UA: NEGATIVE MG/DL
WBC #/AREA URNS AUTO: ABNORMAL /HPF

## 2025-02-14 PROCEDURE — 76856 US EXAM PELVIC COMPLETE: CPT

## 2025-02-14 PROCEDURE — 87591 N.GONORRHOEAE DNA AMP PROB: CPT | Performed by: EMERGENCY MEDICINE

## 2025-02-14 PROCEDURE — 99284 EMERGENCY DEPT VISIT MOD MDM: CPT | Performed by: EMERGENCY MEDICINE

## 2025-02-14 PROCEDURE — 99284 EMERGENCY DEPT VISIT MOD MDM: CPT

## 2025-02-14 PROCEDURE — 81001 URINALYSIS AUTO W/SCOPE: CPT | Performed by: EMERGENCY MEDICINE

## 2025-02-14 PROCEDURE — 96372 THER/PROPH/DIAG INJ SC/IM: CPT

## 2025-02-14 PROCEDURE — 81025 URINE PREGNANCY TEST: CPT | Performed by: EMERGENCY MEDICINE

## 2025-02-14 PROCEDURE — 76830 TRANSVAGINAL US NON-OB: CPT

## 2025-02-14 PROCEDURE — 87491 CHLMYD TRACH DNA AMP PROBE: CPT | Performed by: EMERGENCY MEDICINE

## 2025-02-14 RX ORDER — KETOROLAC TROMETHAMINE 30 MG/ML
30 INJECTION, SOLUTION INTRAMUSCULAR; INTRAVENOUS ONCE
Status: COMPLETED | OUTPATIENT
Start: 2025-02-14 | End: 2025-02-14

## 2025-02-14 RX ORDER — TIRZEPATIDE 5 MG/.5ML
INJECTION, SOLUTION SUBCUTANEOUS
COMMUNITY
Start: 2024-11-20

## 2025-02-14 RX ADMIN — KETOROLAC TROMETHAMINE 30 MG: 30 INJECTION, SOLUTION INTRAMUSCULAR; INTRAVENOUS at 17:56

## 2025-02-14 NOTE — ED PROVIDER NOTES
"Time reflects when diagnosis was documented in both MDM as applicable and the Disposition within this note       Time User Action Codes Description Comment    2/14/2025  7:12 PM Lucía Ward Add [R10.2] Pelvic pain           ED Disposition       None          Assessment & Plan       Medical Decision Making  Abd pain - Will check urine to r/o UTI, send GC/chlamydia, urine preg to r/o ectopic pregnancy, pelvic ultrasound to r/o ovarian torsion.    Amount and/or Complexity of Data Reviewed  Labs: ordered. Decision-making details documented in ED Course.  Radiology: ordered.    Risk  Prescription drug management.        ED Course as of 02/14/25 1912 Fri Feb 14, 2025 1643 Temperature: 98.3 °F (36.8 °C)  afebrile   1723 PREGNANCY TEST URINE: Negative  negative   1802 Bacteria, UA: None Seen  No UTI   1911 Pt reports feeling better.  Aware of wait for US result.   1912 Pt signed out to Dr. Maciel to f/u on US.       Medications   ketorolac (TORADOL) injection 30 mg (30 mg Intramuscular Given 2/14/25 1756)       ED Risk Strat Scores                            SBIRT 22yo+      Flowsheet Row Most Recent Value   Initial Alcohol Screen: US AUDIT-C     1. How often do you have a drink containing alcohol? 0 Filed at: 02/14/2025 1644   2. How many drinks containing alcohol do you have on a typical day you are drinking?  0 Filed at: 02/14/2025 1644   3a. Male UNDER 65: How often do you have five or more drinks on one occasion? 0 Filed at: 02/14/2025 1644   3b. FEMALE Any Age, or MALE 65+: How often do you have 4 or more drinks on one occassion? 0 Filed at: 02/14/2025 1644   Audit-C Score 0 Filed at: 02/14/2025 1644   JUAN: How many times in the past year have you...    Used an illegal drug or used a prescription medication for non-medical reasons? Never Filed at: 02/14/2025 1644                            History of Present Illness       Chief Complaint   Patient presents with    Pelvic Pain     Patient states \"My period " "started yesterday and now its gone, but I have pain in my lower pelvis. It feels like something is moving around\". Took 2 ibuprofen and \"something that made me sleepy\".       Past Medical History:   Diagnosis Date    34 weeks gestation of pregnancy 2021    No known health problems       Past Surgical History:   Procedure Laterality Date     SECTION  , ,     [place]  DELIVERY ONLY N/A 3/18/2021    Procedure:  SECTION () REPEAT;  Surgeon: Sylvia IBARRA MD;  Location: [facility];  Service: Obstetrics    SLEEVE GASTROPLASTY  2018    TUBAL LIGATION N/A 3/18/2021    Procedure: LIGATION/COAGULATION TUBAL;  Surgeon: Sylvia IBARRA MD;  Location: [facility];  Service: Obstetrics      Family History   Problem Relation Age of Onset    Diabetes Mother     Diabetes Father     No Known Problems Sister     No Known Problems Brother     No Known Problems Daughter     No Known Problems Son     Cancer Neg Hx     Breast cancer Neg Hx       Social History     Tobacco Use    Smoking status: Never    Smokeless tobacco: Never   Vaping Use    Vaping status: Never Used   Substance Use Topics    Alcohol use: Not Currently    Drug use: Never      E-Cigarette/Vaping    E-Cigarette Use Never User       E-Cigarette/Vaping Substances    Nicotine No     THC No     CBD No     Flavoring No     Other No     Unknown No       I have reviewed and agree with the history as documented.     39 y.o. F w/h/o tubal ligation, sleeve gastroplasty p/w lower abd pain x 5 hours.  Reports suprapubic pain and radiates to b/l lower abd (worse at LLQ) and back.  States she started her period yesterday, but only lasted 1 day which is abnormal.  Pt states she had a normal period last month.  Denies F/C, N/V/D/C, urinary complaints. Took ibuprofen and flexeril.      History provided by:  Patient   used: No    Pelvic Pain  Associated symptoms: abdominal pain    Associated symptoms: no diarrhea, no fever, " no nausea and no vomiting        Review of Systems   Constitutional:  Negative for chills and fever.   Gastrointestinal:  Positive for abdominal pain. Negative for constipation, diarrhea, nausea and vomiting.   Genitourinary:  Positive for pelvic pain and vaginal bleeding (yesterday). Negative for dysuria, frequency, hematuria and vaginal discharge.   Musculoskeletal:  Positive for back pain.           Objective       ED Triage Vitals [02/14/25 1638]   Temperature Pulse Blood Pressure Respirations SpO2 Patient Position - Orthostatic VS   98.3 °F (36.8 °C) 84 130/80 16 100 % Lying      Temp src Heart Rate Source BP Location FiO2 (%) Pain Score    -- Monitor Left arm -- 10 - Worst Possible Pain      Vitals      Date and Time Temp Pulse SpO2 Resp BP Pain Score FACES Pain Rating User   02/14/25 1813 -- 71 100 % 18 124/84 -- -- KR   02/14/25 1756 -- -- -- -- -- 10 - Worst Possible Pain -- KR   02/14/25 1638 98.3 °F (36.8 °C) 84 100 % 16 130/80 10 - Worst Possible Pain -- OG            Physical Exam  Vitals and nursing note reviewed.   Constitutional:       General: She is not in acute distress.     Appearance: Normal appearance. She is well-developed. She is not ill-appearing, toxic-appearing or diaphoretic.   HENT:      Head: Normocephalic and atraumatic.   Eyes:      General: No scleral icterus.  Neck:      Vascular: No JVD.   Cardiovascular:      Rate and Rhythm: Normal rate and regular rhythm.      Heart sounds: Normal heart sounds. No murmur heard.  Pulmonary:      Effort: Pulmonary effort is normal. No accessory muscle usage or respiratory distress.      Breath sounds: Normal breath sounds. No stridor. No wheezing, rhonchi or rales.   Abdominal:      General: There is no distension.      Palpations: Abdomen is soft. Abdomen is not rigid. There is no mass.      Tenderness: There is abdominal tenderness in the right lower quadrant, suprapubic area and left lower quadrant. There is no guarding or rebound. Negative  signs include Shirley's sign and McBurney's sign.   Skin:     General: Skin is warm and dry.      Coloration: Skin is not jaundiced or pale.      Findings: No rash.   Neurological:      Mental Status: She is alert.      GCS: GCS eye subscore is 4. GCS verbal subscore is 5. GCS motor subscore is 6.         Results Reviewed       Procedure Component Value Units Date/Time    Urine Microscopic [[id number]]  (Abnormal) Collected: 02/14/25 1717    Lab Status: Final result Specimen: Urine, Clean Catch Updated: 02/14/25 1802     RBC, UA 1-2 /hpf      WBC, UA 1-2 /hpf      Epithelial Cells Occasional /hpf      Bacteria, UA None Seen /hpf      MUCUS THREADS Occasional    UA (URINE) with reflex to Scope [[id number]]  (Abnormal) Collected: 02/14/25 1717    Lab Status: Final result Specimen: Urine, Clean Catch Updated: 02/14/25 1735     Color, UA Straw     Clarity, UA Clear     Specific Gravity, UA 1.015     pH, UA 6.0     Leukocytes, UA Negative     Nitrite, UA Negative     Protein, UA Negative mg/dl      Glucose, UA Negative mg/dl      Ketones, UA Negative mg/dl      Bilirubin, UA Negative     Occult Blood, UA 10.0     UROBILINOGEN UA Negative mg/dL     Chlamydia/GC amplified DNA by PCR [[id number]] Collected: 02/14/25 1717    Lab Status: In process Specimen: Urine, Other Updated: 02/14/25 1724    POCT pregnancy, urine [[id number]]  (Normal) Collected: 02/14/25 1723    Lab Status: Final result Updated: 02/14/25 1723     EXT Preg Test, Ur Negative     Control Valid            US pelvis complete w transvaginal    (Results Pending)       Procedures    ED Medication and Procedure Management   Prior to Admission Medications   Prescriptions Last Dose Informant Patient Reported? Taking?   Zepbound 5 MG/0.5ML auto-injector   Yes Yes   Sig: INJECT 5MG/0.5ML SUBCUTANEOUSLY ONCE A WEEK      Facility-Administered Medications: None     Patient's Medications   Discharge Prescriptions    No medications on file     No discharge procedures on  file.  ED SEPSIS DOCUMENTATION   Time reflects when diagnosis was documented in both MDM as applicable and the Disposition within this note       Time User Action Codes Description Comment    2/14/2025  7:12 PM Lucía Ward Add [R10.2] Pelvic pain                  Lucía Ward DO  02/14/25 1913

## 2025-02-15 NOTE — DISCHARGE INSTRUCTIONS
Gran cantidad de productos sanguíneos retenidos en el mili uterino, presumiblemente relacionados con la menstruación. Se puede considerar mehnaz ecografía de seguimiento de 6 a 12 semanas para excluir mehnaz anomalía subyacente

## 2025-02-16 LAB
C TRACH DNA SPEC QL NAA+PROBE: NEGATIVE
N GONORRHOEA DNA SPEC QL NAA+PROBE: NEGATIVE

## 2025-03-04 ENCOUNTER — TELEPHONE (OUTPATIENT)
Dept: OBGYN CLINIC | Facility: CLINIC | Age: 40
End: 2025-03-04

## 2025-04-03 ENCOUNTER — VBI (OUTPATIENT)
Dept: ADMINISTRATIVE | Facility: OTHER | Age: 40
End: 2025-04-03

## 2025-04-03 NOTE — TELEPHONE ENCOUNTER
04/03/25 8:42 AM     Chart reviewed for Pap Smear (HPV) aka Cervical Cancer Screening ; nothing is submitted to the patient's insurance at this time.     Fransisco Clifton MA   [facility]

## (undated) DEVICE — PACK C-SECTION PBDS

## (undated) DEVICE — SPONGE LAP 18 X 18 IN STRL RFD

## (undated) DEVICE — ABDOMINAL PAD: Brand: DERMACEA

## (undated) DEVICE — GLOVE INDICATOR PI UNDERGLOVE SZ 7.5 BLUE

## (undated) DEVICE — SUT PLAIN 3-0 CT-1 27 IN 842H

## (undated) DEVICE — TELFA NON-ADHERENT ABSORBENT DRESSING: Brand: TELFA

## (undated) DEVICE — SUT MONOCRYL 4-0 PS-2 27 IN Y426H

## (undated) DEVICE — SUT VICRYL 0 CT 36 IN J958H

## (undated) DEVICE — CHLORAPREP HI-LITE 26ML ORANGE

## (undated) DEVICE — SCD SEQUENTIAL COMPRESSION COMFORT SLEEVE MEDIUM KNEE LENGTH: Brand: KENDALL SCD

## (undated) DEVICE — GLOVE SRG BIOGEL ECLIPSE 7

## (undated) DEVICE — ABG MICROSTICKS SAFETY